# Patient Record
Sex: FEMALE | Race: OTHER
[De-identification: names, ages, dates, MRNs, and addresses within clinical notes are randomized per-mention and may not be internally consistent; named-entity substitution may affect disease eponyms.]

---

## 2017-02-16 ENCOUNTER — RECORD ABSTRACTING (OUTPATIENT)
Age: 62
End: 2017-02-16

## 2017-02-16 DIAGNOSIS — M25.572 PAIN IN LEFT ANKLE AND JOINTS OF LEFT FOOT: ICD-10-CM

## 2017-02-16 DIAGNOSIS — Z01.419 ENCOUNTER FOR GYNECOLOGICAL EXAMINATION (GENERAL) (ROUTINE) W/OUT ABNORMAL FINDINGS: ICD-10-CM

## 2017-02-16 DIAGNOSIS — Z78.9 OTHER SPECIFIED HEALTH STATUS: ICD-10-CM

## 2017-02-16 DIAGNOSIS — Z92.89 PERSONAL HISTORY OF OTHER MEDICAL TREATMENT: ICD-10-CM

## 2017-02-16 DIAGNOSIS — R73.03 PREDIABETES.: ICD-10-CM

## 2017-02-16 DIAGNOSIS — M79.672 PAIN IN LEFT FOOT: ICD-10-CM

## 2017-02-16 DIAGNOSIS — K29.70 GASTRITIS, UNSPECIFIED, W/OUT BLEEDING: ICD-10-CM

## 2017-02-16 DIAGNOSIS — Z87.09 PERSONAL HISTORY OF OTHER DISEASES OF THE RESPIRATORY SYSTEM: ICD-10-CM

## 2017-02-16 PROBLEM — Z00.00 ENCOUNTER FOR PREVENTIVE HEALTH EXAMINATION: Status: ACTIVE | Noted: 2017-02-16

## 2017-02-16 RX ORDER — OMEPRAZOLE 40 MG/1
40 CAPSULE, DELAYED RELEASE ORAL
Refills: 0 | Status: ACTIVE | COMMUNITY

## 2017-02-23 ENCOUNTER — APPOINTMENT (OUTPATIENT)
Dept: INTERNAL MEDICINE | Facility: CLINIC | Age: 62
End: 2017-02-23

## 2018-07-23 PROBLEM — R73.03 PREDIABETES: Status: ACTIVE | Noted: 2017-02-16

## 2022-04-04 ENCOUNTER — EMERGENCY (EMERGENCY)
Facility: HOSPITAL | Age: 67
LOS: 0 days | Discharge: HOME | End: 2022-04-04
Attending: EMERGENCY MEDICINE | Admitting: EMERGENCY MEDICINE
Payer: SUBSIDIZED

## 2022-04-04 VITALS
RESPIRATION RATE: 20 BRPM | HEART RATE: 70 BPM | DIASTOLIC BLOOD PRESSURE: 63 MMHG | SYSTOLIC BLOOD PRESSURE: 101 MMHG | OXYGEN SATURATION: 99 % | TEMPERATURE: 98 F
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
## 2022-04-04 DIAGNOSIS — M54.6 PAIN IN THORACIC SPINE: ICD-10-CM

## 2022-04-04 DIAGNOSIS — M25.511 PAIN IN RIGHT SHOULDER: ICD-10-CM

## 2022-04-04 DIAGNOSIS — R07.81 PLEURODYNIA: ICD-10-CM
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
## 2022-04-04 PROCEDURE — 71046 X-RAY EXAM CHEST 2 VIEWS: CPT | Mod: 26

## 2022-04-04 PROCEDURE — 73080 X-RAY EXAM OF ELBOW: CPT | Mod: 26,RT

## 2022-04-04 PROCEDURE — 99285 EMERGENCY DEPT VISIT HI MDM: CPT

## 2022-04-04 PROCEDURE — 73090 X-RAY EXAM OF FOREARM: CPT | Mod: 26,RT

## 2022-04-04 PROCEDURE — 73060 X-RAY EXAM OF HUMERUS: CPT | Mod: 26,RT

## 2022-04-04 PROCEDURE — G1004: CPT

## 2022-04-04 PROCEDURE — 71250 CT THORAX DX C-: CPT | Mod: 26,ME

## 2022-04-04 RX ORDER — KETOROLAC TROMETHAMINE 30 MG/ML
30 SYRINGE (ML) INJECTION ONCE
Refills: 0 | Status: DISCONTINUED | OUTPATIENT
Start: 2022-04-04 | End: 2022-04-04

## 2022-04-04 RX ORDER — MORPHINE SULFATE 50 MG/1
2 CAPSULE, EXTENDED RELEASE ORAL ONCE
Refills: 0 | Status: DISCONTINUED | OUTPATIENT
Start: 2022-04-04 | End: 2022-04-04

## 2022-04-04 RX ORDER — METHOCARBAMOL 500 MG/1
2 TABLET, FILM COATED ORAL
Qty: 18 | Refills: 0
Start: 2022-04-04 | End: 2022-04-06

## 2022-04-04 RX ORDER — KETOROLAC TROMETHAMINE 30 MG/ML
1 SYRINGE (ML) INJECTION
Qty: 12 | Refills: 0
Start: 2022-04-04 | End: 2022-04-06

## 2022-04-04 RX ADMIN — MORPHINE SULFATE 2 MILLIGRAM(S): 50 CAPSULE, EXTENDED RELEASE ORAL at 17:21

## 2022-04-04 RX ADMIN — Medication 30 MILLIGRAM(S): at 12:38

## 2022-04-04 NOTE — ED PROVIDER NOTE - PATIENT PORTAL LINK FT
You can access the FollowMyHealth Patient Portal offered by [facility] by registering at the following website: [email or web address]. By joining [employer]’s FollowMyHealth portal, you will also be able to view your health information using other applications (apps) compatible with our system.

## 2022-04-04 NOTE — ED PROVIDER NOTE - PHYSICAL EXAMINATION
CONSTITUTIONAL: Well-developed; well-nourished; in no acute distress.   SKIN: warm, dry.  HEAD: Normocephalic; atraumatic.  EYES: PERRL, EOMI, no conjunctival erythema.  ENT: No nasal discharge; airway clear. MMM.  NECK: Supple; non tender. No c-spine ttp.  CARD: S1, S2 normal; no murmurs, gallops, or rubs. Regular rate and rhythm. +ttp to R ribcage.  RESP: No wheezes, rales, or rhonchi. Lungs CTAB.  ABD: soft ntnd; no masses, rebound, or guarding.  EXT: No clubbing, cyanosis, or edema. No spinal ttp. +ttp to R scapula and R shoulder, proximal humerus, and elbow. Pulses 2+ intact.  NEURO: Alert, oriented, grossly unremarkable. No focal neuro. deficits. Normal sensation.  PSYCH: Cooperative, appropriate.

## 2022-04-04 NOTE — ED PROVIDER NOTE - NSFOLLOWUPINSTRUCTIONS_ED_ALL_ED_FT
Acute Back Pain, Adult      Acute back pain is sudden and usually short-lived. It is often caused by an injury to the muscles and tissues in the back. The injury may result from:  •A muscle or ligament getting overstretched or torn (strained). Ligaments are tissues that connect bones to each other. Lifting something improperly can cause a back strain.      •Wear and tear (degeneration) of the spinal disks. Spinal disks are circular tissue that provide cushioning between the bones of the spine (vertebrae).      •Twisting motions, such as while playing sports or doing yard work.      •A hit to the back.      •Arthritis.      You may have a physical exam, lab tests, and imaging tests to find the cause of your pain. Acute back pain usually goes away with rest and home care.      Follow these instructions at home:    Managing pain, stiffness, and swelling     •Treatment may include medicines for pain and inflammation that are taken by mouth or applied to the skin, prescription pain medicine, or muscle relaxants. Take over-the-counter and prescription medicines only as told by your health care provider.    •Your health care provider may recommend applying ice during the first 24–48 hours after your pain starts. To do this:  •Put ice in a plastic bag.      •Place a towel between your skin and the bag.      •Leave the ice on for 20 minutes, 2–3 times a day.      •If directed, apply heat to the affected area as often as told by your health care provider. Use the heat source that your health care provider recommends, such as a moist heat pack or a heating pad.  •Place a towel between your skin and the heat source.      •Leave the heat on for 20–30 minutes.      •Remove the heat if your skin turns bright red. This is especially important if you are unable to feel pain, heat, or cold. You have a greater risk of getting burned.          Activity      • Do not stay in bed. Staying in bed for more than 1–2 days can delay your recovery.    •Sit up and stand up straight. Avoid leaning forward when you sit or hunching over when you stand.  •If you work at a desk, sit close to it so you do not need to lean over. Keep your chin tucked in. Keep your neck drawn back, and keep your elbows bent at a 90-degree angle (right angle).      •Sit high and close to the steering wheel when you drive. Add lower back (lumbar) support to your car seat, if needed.        •Take short walks on even surfaces as soon as you are able. Try to increase the length of time you walk each day.      • Do not sit, drive, or  one place for more than 30 minutes at a time. Sitting or standing for long periods of time can put stress on your back.      • Do not drive or use heavy machinery while taking prescription pain medicine.    •Use proper lifting techniques. When you bend and lift, use positions that put less stress on your back:  •Bend your knees.      •Keep the load close to your body.      •Avoid twisting.        •Exercise regularly as told by your health care provider. Exercising helps your back heal faster and helps prevent back injuries by keeping muscles strong and flexible.      •Work with a physical therapist to make a safe exercise program, as recommended by your health care provider. Do any exercises as told by your physical therapist.      Lifestyle     •Maintain a healthy weight. Extra weight puts stress on your back and makes it difficult to have good posture.      •Avoid activities or situations that make you feel anxious or stressed. Stress and anxiety increase muscle tension and can make back pain worse. Learn ways to manage anxiety and stress, such as through exercise.      General instructions     •Sleep on a firm mattress in a comfortable position. Try lying on your side with your knees slightly bent. If you lie on your back, put a pillow under your knees.    •Follow your treatment plan as told by your health care provider. This may include:  •Cognitive or behavioral therapy.      •Acupuncture or massage therapy.      •Meditation or yoga.          Contact a health care provider if:    •You have pain that is not relieved with rest or medicine.      •You have increasing pain going down into your legs or buttocks.      •Your pain does not improve after 2 weeks.      •You have pain at night.      •You lose weight without trying.      •You have a fever or chills.        Get help right away if:    •You develop new bowel or bladder control problems.      •You have unusual weakness or numbness in your arms or legs.      •You develop nausea or vomiting.      •You develop abdominal pain.      •You feel faint.        Summary    •Acute back pain is sudden and usually short-lived.      •Use proper lifting techniques. When you bend and lift, use positions that put less stress on your back.      •Take over-the-counter and prescription medicines and apply heat or ice as directed by your health care provider.      This information is not intended to replace advice given to you by your health care provider. Make sure you discuss any questions you have with your health care provider.

## 2022-04-04 NOTE — ED PROVIDER NOTE - OBJECTIVE STATEMENT
66 yo f presenting for R shoulder and R upper back pain s/p mechanical trip and fall 4 days ago. No head trauma, no LOC, not on any AC. Denies any headache, numbness/tingling, weakness, sob, cp, or abdominal pain.

## 2022-04-04 NOTE — ED PROVIDER NOTE - NSFOLLOWUPCLINICS_GEN_ALL_ED_FT
[facility]  Medicine  [address], [place]   Phone: [phone]  Fax:   Follow Up Time: 4-6 Days

## 2022-04-04 NOTE — ED PROVIDER NOTE - ATTENDING CONTRIBUTION TO CARE
I personally evaluated patient. I agree with the findings and plan with all documentation on chart except as documented  in my note.    67-year-old female presents to the emergency department with right shoulder and right upper back pain for the past 4 days.  Patient had a mechanical trip and fall on her right side.  She denies any head trauma.  No LOC.  Patient's been taking over-the-counter medicine without relief.  No fever, chills, or recent illness.  Patient denies abdominal pain, numbness, weakness, tingling.  Patient denies chest pain.  No other trauma or injuries.  On exam, vital signs reviewed.  Patient appears in mild pain.  Gross neuro exam is normal.  GCS 15.  Patient is Nexus negative.  Patient has tenderness to right upper scapula and right proximal humerus, right elbow.  Patient has normal pulses and sensation.  Appropriate imaging done and patient not found to have any acute traumatic injuries.  Will discharge with anti-inflammatory course of muscle relaxant.  Patient has proper follow-up.    I discussed with patient need for possible MRI for further work up for their symptoms and how this was not possible in the Emergency Department at this time.  Follow up being provided to have further evaluation for this test given.    Full DC instructions discussed and patient knows when to seek immediate medical attention.  Patient has proper follow up.  All results discussed and patient aware they may require further work up.  Proper follow up ensured. Limitations of ED work up discussed.  Medications administered and prescribed/OTC home meds discussed.  All questions and concerns from patient or family addressed. Understanding of instructions verbalized.

## 2022-04-04 NOTE — ED PROVIDER NOTE - CLINICAL SUMMARY MEDICAL DECISION MAKING FREE TEXT BOX
67-year-old female presents to the emergency department with right shoulder and right upper back pain for the past 4 days.  Patient had a mechanical trip and fall on her right side.  She denies any head trauma.  No LOC.  Patient's been taking over-the-counter medicine without relief.  No fever, chills, or recent illness.  Patient denies abdominal pain, numbness, weakness, tingling.  Patient denies chest pain.  No other trauma or injuries.  On exam, vital signs reviewed.  Patient appears in mild pain.  Gross neuro exam is normal.  GCS 15.  Patient is Nexus negative.  Patient has tenderness to right upper scapula and right proximal humerus, right elbow.  Patient has normal pulses and sensation.  Appropriate imaging done and patient not found to have any acute traumatic injuries.  Will discharge with anti-inflammatory course of muscle relaxant.  Patient has proper follow-up.    I discussed with patient need for possible MRI for further work up for their symptoms and how this was not possible in the Emergency Department at this time.  Follow up being provided to have further evaluation for this test given.    Full DC instructions discussed and patient knows when to seek immediate medical attention.  Patient has proper follow up.  All results discussed and patient aware they may require further work up.  Proper follow up ensured. Limitations of ED work up discussed.  Medications administered and prescribed/OTC home meds discussed.  All questions and concerns from patient or family addressed. Understanding of instructions verbalized.

## 2022-04-13 ENCOUNTER — APPOINTMENT (OUTPATIENT)
Dept: ORTHOPEDIC SURGERY | Facility: CLINIC | Age: 67
End: 2022-04-13

## 2022-04-26 ENCOUNTER — OUTPATIENT (OUTPATIENT)
Dept: OUTPATIENT SERVICES | Facility: HOSPITAL | Age: 67
LOS: 1 days | Discharge: HOME | End: 2022-04-26
Payer: SUBSIDIZED

## 2022-04-26 ENCOUNTER — APPOINTMENT (OUTPATIENT)
Dept: OPHTHALMOLOGY | Facility: CLINIC | Age: 67
End: 2022-04-26

## 2022-04-26 PROCEDURE — 92004 COMPRE OPH EXAM NEW PT 1/>: CPT

## 2022-05-05 DIAGNOSIS — H25.13 AGE-RELATED NUCLEAR CATARACT, BILATERAL: ICD-10-CM

## 2022-05-05 DIAGNOSIS — H04.123 DRY EYE SYNDROME OF BILATERAL LACRIMAL GLANDS: ICD-10-CM

## 2022-05-05 DIAGNOSIS — H35.039 HYPERTENSIVE RETINOPATHY, UNSPECIFIED EYE: ICD-10-CM

## 2022-08-23 ENCOUNTER — OUTPATIENT (OUTPATIENT)
Dept: OUTPATIENT SERVICES | Facility: HOSPITAL | Age: 67
LOS: 1 days | Discharge: HOME | End: 2022-08-23

## 2022-08-23 ENCOUNTER — APPOINTMENT (OUTPATIENT)
Dept: OPHTHALMOLOGY | Facility: CLINIC | Age: 67
End: 2022-08-23

## 2022-08-23 PROCEDURE — 92014 COMPRE OPH EXAM EST PT 1/>: CPT

## 2022-09-06 ENCOUNTER — OUTPATIENT (OUTPATIENT)
Dept: OUTPATIENT SERVICES | Facility: HOSPITAL | Age: 67
LOS: 1 days | Discharge: HOME | End: 2022-09-06

## 2022-09-06 ENCOUNTER — APPOINTMENT (OUTPATIENT)
Dept: NEUROLOGY | Facility: CLINIC | Age: 67
End: 2022-09-06

## 2022-09-06 VITALS
BODY MASS INDEX: 23.95 KG/M2 | DIASTOLIC BLOOD PRESSURE: 67 MMHG | WEIGHT: 149 LBS | HEIGHT: 66 IN | TEMPERATURE: 97 F | SYSTOLIC BLOOD PRESSURE: 101 MMHG | HEART RATE: 67 BPM | OXYGEN SATURATION: 98 %

## 2022-09-06 DIAGNOSIS — F03.90 UNSPECIFIED DEMENTIA WITHOUT BEHAVIORAL DISTURBANCE: ICD-10-CM

## 2022-09-06 PROCEDURE — 99204 OFFICE O/P NEW MOD 45 MIN: CPT | Mod: GC

## 2022-09-06 NOTE — ASSESSMENT
[FreeTextEntry1] : Assessment and plan:  \par \par 66 yo F w PMH of asthma is here with memory issues, and today’s exam revealed more short-term memory problems with relatively benign neurologic examination. Possible causes include but not limited to Alsheimer type Dementia vs Fronto-temporal dementia. Needs to rule out reversible types of dementia/cognitive disorder.  \par \par  \par \par Plan:  \par \par 1.  CBC w diff , CMP, Ca, Mg, Phos, TSH, Vit B12, folate \par 2.  CT head \par 3.  Donepezil 5 mg po qday with food.\par 4.  Return in 3-4 months.\par 5.  Discussed home safety.   \par \par

## 2022-09-06 NOTE — PHYSICAL EXAM
[FreeTextEntry1] : General:  Appearance is consistent with chronologic age.  \par \par Cognitive/Language:  Awake, alert, and oriented to person, place, time and date.  Recent memory: 2 words recall from 3, some signs of ideomotor apraxia, seems like remote memory intact.  Naming, repetition and comprehension intact. Nondysarthric.   \par \par Cranial Nerves \par \par - Eyes: Visual acuity intact, Visual fields full.  EOMI w/o nystagmus, skew or reported double vision.  PERRL.  No ptosis/weakness of eyelid closure.   \par \par - Face:  Facial sensation normal V1 - 3, no facial asymmetry.   \par \par - Ears/Nose/Throat:  Hearing grossly intact b/l to finger rub.  Palate elevates midline.  Tongue and uvula midline.  \par \par Motor examination:  (MRC grade R/L) 5/5 UE; 5/5 LE.  No observable drift. Normal tone and bulk. No tenderness, twitching, tremors or involuntary movements. \par \par Sensory examination:  Intact to light touch and pinprick, pain, temperature and proprioception and vibration in all extremities. \par \par Reflexes:   2+ b/l biceps, triceps, patella and achilles.  Plantar response downgoing b/l.  Jaw jerk, Abel, clonus absent. \par \par Cerebellum:   FTN/HKS intact.  No dysmetria.   \par \par Gait narrow based and normal.

## 2022-09-06 NOTE — HISTORY OF PRESENT ILLNESS
[FreeTextEntry1] : 66 yo F w PMH of Asthma, back pain p/w memory issues and per her daughter stating starting about 7  years ago. She started having short-memory problems first, forgetting the keys, money displaced. About 3-4 years ago her daughter noticed her having some personality changes being more joyful, “hyped” and careless. She loves going outside and spending time with her neighbors, friends and always can find her way back home. He daughter worried that she may by lost some day since she lives home alone.  \par \par PMH: Asthma, with remote attacks, panic attacks, back pain \par \par PSxH: Uterine surgery (unspecified) 3-4 y ago.  \par \par FHx: From 12 siblings alive only 4 (many of them  in young age with unknown reason): 3 sisters and 1 brother. The older sister has some behavioral issues and the younger sister who is in her 50s has progressive muscular dystrophy. She may have thalassemia trait.  \par \par SHx: Lives in [place], with his son, she is here visiting her youngest daughter.  \par \par ROS: No vision problems, HA, Seizures, diplopia, hallucinations, dizziness, balance problems, weakness, appetite or weight loss.

## 2022-09-06 NOTE — END OF VISIT
[] : Resident [FreeTextEntry3] : Pt seen and I think AD is most likely.  Will check labs and imaging and start on donepeizl 5 mg/day.  Return in 3-4 months.

## 2022-09-28 ENCOUNTER — APPOINTMENT (OUTPATIENT)
Dept: INTERNAL MEDICINE | Facility: CLINIC | Age: 67
End: 2022-09-28

## 2022-09-29 ENCOUNTER — OUTPATIENT (OUTPATIENT)
Dept: OUTPATIENT SERVICES | Facility: HOSPITAL | Age: 67
LOS: 1 days | Discharge: HOME | End: 2022-09-29

## 2022-09-29 DIAGNOSIS — F03.90 UNSPECIFIED DEMENTIA WITHOUT BEHAVIORAL DISTURBANCE: ICD-10-CM

## 2022-09-29 PROCEDURE — 70450 CT HEAD/BRAIN W/O DYE: CPT | Mod: 26

## 2022-10-08 LAB
FOLATE SERPL-MCNC: 17 NG/ML
TSH SERPL-ACNC: 1.09 UIU/ML
VIT B12 SERPL-MCNC: 502 PG/ML

## 2022-10-12 ENCOUNTER — NON-APPOINTMENT (OUTPATIENT)
Age: 67
End: 2022-10-12

## 2023-02-28 ENCOUNTER — OUTPATIENT (OUTPATIENT)
Dept: OUTPATIENT SERVICES | Facility: HOSPITAL | Age: 68
LOS: 1 days | End: 2023-02-28
Payer: COMMERCIAL

## 2023-02-28 ENCOUNTER — APPOINTMENT (OUTPATIENT)
Dept: OPHTHALMOLOGY | Facility: CLINIC | Age: 68
End: 2023-02-28

## 2023-02-28 DIAGNOSIS — K02.9 DENTAL CARIES, UNSPECIFIED: ICD-10-CM

## 2023-02-28 PROCEDURE — D0140: CPT

## 2023-02-28 PROCEDURE — D0220: CPT

## 2023-03-01 DIAGNOSIS — K03.81 CRACKED TOOTH: ICD-10-CM

## 2023-03-07 ENCOUNTER — APPOINTMENT (OUTPATIENT)
Dept: NEUROLOGY | Facility: CLINIC | Age: 68
End: 2023-03-07

## 2023-03-07 ENCOUNTER — APPOINTMENT (OUTPATIENT)
Dept: NEUROLOGY | Facility: CLINIC | Age: 68
End: 2023-03-07
Payer: COMMERCIAL

## 2023-03-07 ENCOUNTER — OUTPATIENT (OUTPATIENT)
Dept: OUTPATIENT SERVICES | Facility: HOSPITAL | Age: 68
LOS: 1 days | End: 2023-03-07
Payer: COMMERCIAL

## 2023-03-07 VITALS
SYSTOLIC BLOOD PRESSURE: 129 MMHG | HEART RATE: 64 BPM | DIASTOLIC BLOOD PRESSURE: 74 MMHG | BODY MASS INDEX: 25.52 KG/M2 | TEMPERATURE: 97 F | WEIGHT: 158.8 LBS | OXYGEN SATURATION: 100 % | HEIGHT: 66 IN

## 2023-03-07 DIAGNOSIS — Z00.00 ENCOUNTER FOR GENERAL ADULT MEDICAL EXAMINATION WITHOUT ABNORMAL FINDINGS: ICD-10-CM

## 2023-03-07 PROCEDURE — 99213 OFFICE O/P EST LOW 20 MIN: CPT

## 2023-03-08 NOTE — HISTORY OF PRESENT ILLNESS
[FreeTextEntry1] : 69 yo F w PMH of Asthma, back pain is here for f/u for memory issues that started about 7  years ago. Lab works were done and was unremarkable. She started her medications: Aricept 5 mg. and per son she is much better, but when she forgets to take medications her memory is poor. \par \par \par PMH: Asthma, with remote attacks, panic attacks, back pain \par \par PSxH: Uterine surgery (unspecified) 3-4 y ago.  \par \par FHx: From 12 siblings alive only 4 (many of them  in young age with unknown reason): 3 sisters and 1 brother. The older sister has some behavioral issues and the younger sister who is in her 50s has progressive muscular dystrophy. She may have thalassemia trait.  \par \par SHx: Lives in [place], with his son, she is here visiting her youngest daughter.  \par \par ROS: No vision problems, HA, Seizures, diplopia, hallucinations, dizziness, balance problems, weakness, appetite or weight loss.

## 2023-03-08 NOTE — ASSESSMENT
[FreeTextEntry1] : Assessment and plan:  \par \par 67 yo F w PMH of asthma is here with memory issues, and today’s exam revealed more short-term memory problems with MMSE of 21 with relatively benign neurologic examination. Comparing with 6 month ago, she is better in cognition with less confusion on exam. Possible causes include but not limited to Alzheimer type Dementia vs Fronto-temporal dementia.  CT scan was done, unremarkable for acute changes or masses, only mild chronic microvascular ds.  \par \par  \par \par Plan:  \par \par 1.  Increase Donepezil  to 10 mg po qday with food.\par 2.  Return in 6 months.\par 3.  Discussed home safety.   \par \par

## 2023-03-08 NOTE — END OF VISIT
[] : Resident [FreeTextEntry3] : I visited the patient with resident. Agree with history, physical exam and plan as above.\par Son reported overall stable cognition and memory, somehow some improvement. Recommended to increase Aricept to 10 mg daily. RTC in 6 months

## 2023-03-13 DIAGNOSIS — F03.90 UNSPECIFIED DEMENTIA, UNSPECIFIED SEVERITY, WITHOUT BEHAVIORAL DISTURBANCE, PSYCHOTIC DISTURBANCE, MOOD DISTURBANCE, AND ANXIETY: ICD-10-CM

## 2023-03-28 ENCOUNTER — OUTPATIENT (OUTPATIENT)
Dept: OUTPATIENT SERVICES | Facility: HOSPITAL | Age: 68
LOS: 1 days | End: 2023-03-28
Payer: COMMERCIAL

## 2023-03-28 ENCOUNTER — APPOINTMENT (OUTPATIENT)
Dept: OPHTHALMOLOGY | Facility: CLINIC | Age: 68
End: 2023-03-28
Payer: COMMERCIAL

## 2023-03-28 DIAGNOSIS — H53.8 OTHER VISUAL DISTURBANCES: ICD-10-CM

## 2023-03-28 PROCEDURE — 92134 CPTRZ OPH DX IMG PST SGM RTA: CPT | Mod: 26

## 2023-03-28 PROCEDURE — 92014 COMPRE OPH EXAM EST PT 1/>: CPT

## 2023-03-28 PROCEDURE — 92134 CPTRZ OPH DX IMG PST SGM RTA: CPT

## 2023-03-31 DIAGNOSIS — H35.039 HYPERTENSIVE RETINOPATHY, UNSPECIFIED EYE: ICD-10-CM

## 2023-03-31 DIAGNOSIS — H25.13 AGE-RELATED NUCLEAR CATARACT, BILATERAL: ICD-10-CM

## 2023-03-31 DIAGNOSIS — H04.123 DRY EYE SYNDROME OF BILATERAL LACRIMAL GLANDS: ICD-10-CM

## 2023-04-26 ENCOUNTER — APPOINTMENT (OUTPATIENT)
Dept: OPHTHALMOLOGY | Facility: CLINIC | Age: 68
End: 2023-04-26

## 2023-05-17 ENCOUNTER — OUTPATIENT (OUTPATIENT)
Dept: OUTPATIENT SERVICES | Facility: HOSPITAL | Age: 68
LOS: 1 days | End: 2023-05-17
Payer: COMMERCIAL

## 2023-05-17 DIAGNOSIS — K02.9 DENTAL CARIES, UNSPECIFIED: ICD-10-CM

## 2023-05-17 DIAGNOSIS — K02.63 DENTAL CARIES ON SMOOTH SURFACE PENETRATING INTO PULP: ICD-10-CM

## 2023-05-17 PROCEDURE — D0140: CPT

## 2023-05-17 PROCEDURE — D0330: CPT

## 2023-06-15 ENCOUNTER — OUTPATIENT (OUTPATIENT)
Dept: OUTPATIENT SERVICES | Facility: HOSPITAL | Age: 68
LOS: 1 days | End: 2023-06-15
Payer: COMMERCIAL

## 2023-06-15 ENCOUNTER — APPOINTMENT (OUTPATIENT)
Dept: OPHTHALMOLOGY | Facility: CLINIC | Age: 68
End: 2023-06-15
Payer: COMMERCIAL

## 2023-06-15 DIAGNOSIS — H53.8 OTHER VISUAL DISTURBANCES: ICD-10-CM

## 2023-06-15 DIAGNOSIS — Z01.20 ENCOUNTER FOR DENTAL EXAMINATION AND CLEANING WITHOUT ABNORMAL FINDINGS: ICD-10-CM

## 2023-06-15 PROCEDURE — D0230: CPT

## 2023-06-15 PROCEDURE — 99203 OFFICE O/P NEW LOW 30 MIN: CPT

## 2023-06-15 PROCEDURE — D0120: CPT

## 2023-06-15 PROCEDURE — D0220: CPT

## 2023-06-15 PROCEDURE — 99213 OFFICE O/P EST LOW 20 MIN: CPT

## 2023-06-15 PROCEDURE — D1110: CPT

## 2023-06-16 DIAGNOSIS — H25.013 CORTICAL AGE-RELATED CATARACT, BILATERAL: ICD-10-CM

## 2023-06-16 DIAGNOSIS — H35.369 DRUSEN (DEGENERATIVE) OF MACULA, UNSPECIFIED EYE: ICD-10-CM

## 2023-06-16 DIAGNOSIS — H25.10 AGE-RELATED NUCLEAR CATARACT, UNSPECIFIED EYE: ICD-10-CM

## 2023-06-26 DIAGNOSIS — Z01.21 ENCOUNTER FOR DENTAL EXAMINATION AND CLEANING WITH ABNORMAL FINDINGS: ICD-10-CM

## 2023-08-04 ENCOUNTER — APPOINTMENT (OUTPATIENT)
Dept: OPHTHALMOLOGY | Facility: CLINIC | Age: 68
End: 2023-08-04

## 2023-08-19 ENCOUNTER — APPOINTMENT (OUTPATIENT)
Dept: INTERNAL MEDICINE | Facility: CLINIC | Age: 68
End: 2023-08-19

## 2023-10-24 ENCOUNTER — OUTPATIENT (OUTPATIENT)
Dept: OUTPATIENT SERVICES | Facility: HOSPITAL | Age: 68
LOS: 1 days | End: 2023-10-24
Payer: COMMERCIAL

## 2023-10-24 ENCOUNTER — APPOINTMENT (OUTPATIENT)
Dept: NEUROLOGY | Facility: CLINIC | Age: 68
End: 2023-10-24
Payer: COMMERCIAL

## 2023-10-24 VITALS — DIASTOLIC BLOOD PRESSURE: 87 MMHG | SYSTOLIC BLOOD PRESSURE: 127 MMHG | OXYGEN SATURATION: 99 % | HEART RATE: 65 BPM

## 2023-10-24 DIAGNOSIS — Z00.00 ENCOUNTER FOR GENERAL ADULT MEDICAL EXAMINATION WITHOUT ABNORMAL FINDINGS: ICD-10-CM

## 2023-10-24 PROCEDURE — 99212 OFFICE O/P EST SF 10 MIN: CPT

## 2023-10-25 DIAGNOSIS — F41.0 PANIC DISORDER [EPISODIC PAROXYSMAL ANXIETY]: ICD-10-CM

## 2023-10-25 DIAGNOSIS — F03.90 UNSPECIFIED DEMENTIA, UNSPECIFIED SEVERITY, WITHOUT BEHAVIORAL DISTURBANCE, PSYCHOTIC DISTURBANCE, MOOD DISTURBANCE, AND ANXIETY: ICD-10-CM

## 2023-10-27 ENCOUNTER — APPOINTMENT (OUTPATIENT)
Dept: INTERNAL MEDICINE | Facility: CLINIC | Age: 68
End: 2023-10-27

## 2024-04-30 ENCOUNTER — APPOINTMENT (OUTPATIENT)
Dept: NEUROLOGY | Facility: CLINIC | Age: 69
End: 2024-04-30
Payer: COMMERCIAL

## 2024-04-30 ENCOUNTER — OUTPATIENT (OUTPATIENT)
Dept: OUTPATIENT SERVICES | Facility: HOSPITAL | Age: 69
LOS: 1 days | End: 2024-04-30
Payer: COMMERCIAL

## 2024-04-30 VITALS — HEART RATE: 66 BPM | DIASTOLIC BLOOD PRESSURE: 59 MMHG | SYSTOLIC BLOOD PRESSURE: 99 MMHG | OXYGEN SATURATION: 98 %

## 2024-04-30 DIAGNOSIS — F03.90 UNSPECIFIED DEMENTIA W/OUT BEHAVIORAL DISTURBANCE: ICD-10-CM

## 2024-04-30 DIAGNOSIS — F41.0 PANIC DISORDER [EPISODIC PAROXYSMAL ANXIETY]: ICD-10-CM

## 2024-04-30 DIAGNOSIS — G47.00 INSOMNIA, UNSPECIFIED: ICD-10-CM

## 2024-04-30 DIAGNOSIS — Z00.00 ENCOUNTER FOR GENERAL ADULT MEDICAL EXAMINATION WITHOUT ABNORMAL FINDINGS: ICD-10-CM

## 2024-04-30 PROCEDURE — 99214 OFFICE O/P EST MOD 30 MIN: CPT

## 2024-04-30 RX ORDER — DONEPEZIL HYDROCHLORIDE 10 MG/1
10 TABLET ORAL DAILY
Qty: 60 | Refills: 3 | Status: ACTIVE | COMMUNITY
Start: 2022-09-06 | End: 1900-01-01

## 2024-04-30 RX ORDER — ESCITALOPRAM OXALATE 5 MG/1
5 TABLET ORAL DAILY
Qty: 30 | Refills: 3 | Status: DISCONTINUED | COMMUNITY
Start: 2023-10-24 | End: 2024-04-30

## 2024-04-30 RX ORDER — CHLORHEXIDINE GLUCONATE 4 %
5 LIQUID (ML) TOPICAL
Qty: 60 | Refills: 2 | Status: ACTIVE | COMMUNITY
Start: 2024-04-30 | End: 1900-01-01

## 2024-04-30 RX ORDER — ESCITALOPRAM OXALATE 10 MG/1
10 TABLET ORAL DAILY
Qty: 60 | Refills: 2 | Status: ACTIVE | COMMUNITY
Start: 2024-04-30 | End: 1900-01-01

## 2024-04-30 NOTE — PHYSICAL EXAM
[FreeTextEntry1] : General: Appearance is consistent with chronologic age. Cognitive/Language: Awake, alert, and oriented to person, age, place, time and date (but not year).  Nondysarthric. Cranial Nerves - Eyes: Visual acuity intact, Visual fields full. EOMI w/o nystagmus, skew or reported double vision. PERRL. No ptosis/weakness of eyelid closure - Face: Facial sensation normal V1 - 3, no facial asymmetry. - Ears/Nose/Throat: Hearing grossly intact b/l to finger rub. Palate elevates midline. Tongue and uvula midline. Motor examination: (MRC grade R/L) 5/5 UE; 5/5 LE. No observable drift. Normal tone and bulk. No tremors or involuntary movements.  Tenderness over wrist joints on palpation. Sensory examination: Intact to light touch and pinprick, pain, temperature and proprioception and vibration in all extremities. Reflexes: 2+ b/l biceps, triceps, patella and achilles. Plantar response downgoing b/l. Jaw jerk, Abel, clonus absent. Cerebellum: FTN/HKS intact. No dysmetria. Gait narrow based and normal.

## 2024-04-30 NOTE — HISTORY OF PRESENT ILLNESS
[FreeTextEntry1] : 69 yo F w PMH of asthma, back pain is here for f/u for memory issues that started about 7 years ago. Lab work was done and was unremarkable. CTH in September 2022 unremarkable, showing mild chronic microvascular disease and mild diffuse volume loss.  She takes Aricept (started at 5mg) which was increased to 10mg qD on previous visit in March of this year.  Per daughter, the medication has helped and she feels her mother's memory is stable/improved.  If she forgets to take it, she gets a headache but otherwise does not get headaches.  Per daughter, her memory is not worsened but she sometimes asks to have questions repeated.  She is physically very active (runs), cooks, cleans, with supervision from family.  She lives with daughter and other elderly relatives with whom she bickers.  She has panic attacks and anxiety which comes and goes and also involves chest pain and a sensation of difficulty breathing which resolves after the panic attack passes.  She is friendly and cooperative on exam.   Has occasional pain in joints for which she is seeing her PCP this Friday.  Current visit: Since started on Lexapro, better mood. Doing better. Tolerating well medications she is on. She has headaches but their intensity has improved, only every 10 days. Patient forgets things often. She feels anxiety every 2 days manifested as hand shaking and SOB. She is not visiting a PCP, she would like a referral. No mood problems.

## 2024-04-30 NOTE — ASSESSMENT
[FreeTextEntry1] : 67 yo F w PMH of asthma is here with memory issues. Compared with 6 months ago, her memory issues are stable and she is keeping active (physical activity, cooking, cleaning).  Tolerating donepezil well.  She does experience anxiety and panic attacks with subjective sensation of shortness of breath and chest pain which resolve after panic attack passes every 2 days. On lexapro for that. Patient has insomnia, we started on melatonin.   Plan: - referral for PCP - if insomnia, melatonin 5 mg at bedtime  - continue donepezil 10 mg daily - increase Lexapro to 10 mg daily for anxiety attacks - encouraged continued physical activity and engagement - follow-up in 6 months or sooner as needed

## 2024-05-01 DIAGNOSIS — F41.0 PANIC DISORDER [EPISODIC PAROXYSMAL ANXIETY]: ICD-10-CM

## 2024-05-01 DIAGNOSIS — F03.90 UNSPECIFIED DEMENTIA WITHOUT BEHAVIORAL DISTURBANCE: ICD-10-CM

## 2024-05-01 DIAGNOSIS — G47.00 INSOMNIA, UNSPECIFIED: ICD-10-CM

## 2024-05-06 ENCOUNTER — APPOINTMENT (OUTPATIENT)
Dept: INTERNAL MEDICINE | Facility: CLINIC | Age: 69
End: 2024-05-06

## 2024-06-02 ENCOUNTER — EMERGENCY (EMERGENCY)
Facility: HOSPITAL | Age: 69
LOS: 0 days | Discharge: HOME | End: 2024-06-02
Attending: STUDENT IN AN ORGANIZED HEALTH CARE EDUCATION/TRAINING PROGRAM
Payer: SELF-PAY

## 2024-06-02 VITALS
SYSTOLIC BLOOD PRESSURE: 120 MMHG | WEIGHT: 149.91 LBS | HEART RATE: 62 BPM | OXYGEN SATURATION: 98 % | HEIGHT: 67 IN | TEMPERATURE: 98 F | RESPIRATION RATE: 17 BRPM | DIASTOLIC BLOOD PRESSURE: 67 MMHG

## 2024-06-02 DIAGNOSIS — S61.012A LACERATION WITHOUT FOREIGN BODY OF LEFT THUMB WITHOUT DAMAGE TO NAIL, INITIAL ENCOUNTER: ICD-10-CM

## 2024-06-02 DIAGNOSIS — G30.9 ALZHEIMER'S DISEASE, UNSPECIFIED: ICD-10-CM
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
## 2024-06-02 DIAGNOSIS — F02.80 DEMENTIA IN OTHER DISEASES CLASSIFIED ELSEWHERE, UNSPECIFIED SEVERITY, WITHOUT BEHAVIORAL DISTURBANCE, PSYCHOTIC DISTURBANCE, MOOD DISTURBANCE, AND ANXIETY: ICD-10-CM

## 2024-06-02 DIAGNOSIS — S61.211A LACERATION WITHOUT FOREIGN BODY OF LEFT INDEX FINGER WITHOUT DAMAGE TO NAIL, INITIAL ENCOUNTER: ICD-10-CM

## 2024-06-02 DIAGNOSIS — Z23 ENCOUNTER FOR IMMUNIZATION: ICD-10-CM
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
## 2024-06-02 PROCEDURE — 12001 RPR S/N/AX/GEN/TRNK 2.5CM/<: CPT

## 2024-06-02 PROCEDURE — 99284 EMERGENCY DEPT VISIT MOD MDM: CPT | Mod: 25

## 2024-06-02 PROCEDURE — 90715 TDAP VACCINE 7 YRS/> IM: CPT

## 2024-06-02 PROCEDURE — 12002 RPR S/N/AX/GEN/TRNK2.6-7.5CM: CPT

## 2024-06-02 PROCEDURE — 99283 EMERGENCY DEPT VISIT LOW MDM: CPT | Mod: 25

## 2024-06-02 PROCEDURE — 90471 IMMUNIZATION ADMIN: CPT

## 2024-06-02 RX ORDER — TETANUS TOXOID, REDUCED DIPHTHERIA TOXOID AND ACELLULAR PERTUSSIS VACCINE, ADSORBED 5; 2.5; 8; 8; 2.5 [IU]/.5ML; [IU]/.5ML; UG/.5ML; UG/.5ML; UG/.5ML
0.5 SUSPENSION INTRAMUSCULAR ONCE
Refills: 0 | Status: COMPLETED | OUTPATIENT
Start: 2024-06-02 | End: 2024-06-02

## 2024-06-02 RX ADMIN — TETANUS TOXOID, REDUCED DIPHTHERIA TOXOID AND ACELLULAR PERTUSSIS VACCINE, ADSORBED 0.5 MILLILITER(S): 5; 2.5; 8; 8; 2.5 SUSPENSION INTRAMUSCULAR at 22:42

## 2024-06-02 NOTE — ED PROVIDER NOTE - ATTENDING APP SHARED VISIT CONTRIBUTION OF CARE
69-year-old female past medical history significant for Alzheimer's who presents for a laceration.  Patient was cutting food in her kitchen when she sustained a laceration to the base of the left thumb.  Patient is right-hand dominant.  Patient denies any numbness tingling or any other medical complaints.    Vital Signs: I have reviewed the initial vital signs.  Constitutional: well-nourished, appears stated age, no acute distress  Eyes: Conjunctiva pink, Sclera clear  Cardiovascular: well-perfused extremities, radial pulses equal and 2+ b/l.   Respiratory: unlabored respiratory effort, pt is speaking full sentences. no accessory muscle use.   Musculoskeletal: FROM of digits b/l.   Integumentary: warm, dry, no rash. ~1.5 laceration to the webbed space between the left thumb and index finger  Neurologic: awake, alert, median, radial, and ulnar nerve motor and sensory functions grossly intact b/l.   Psychiatric: appropriate mood, appropriate affect

## 2024-06-02 NOTE — ED PROVIDER NOTE - PATIENT PORTAL LINK FT
You can access the FollowMyHealth Patient Portal offered by [facility] by registering at the following website: [email or web address]. By joining [employer]’s FollowMyHealth portal, you will also be able to view your health information using other applications (apps) compatible with our system.

## 2024-06-02 NOTE — ED PROVIDER NOTE - PHYSICAL EXAMINATION
Physical Exam    Vital Signs: I have reviewed the initial vital signs.  Constitutional: well-nourished, appears stated age, no acute distress  Eyes: Conjunctiva pink, Sclera clear  Cardiovascular: well-perfused extremities, radial pulses equal and 2+ b/l.   Respiratory: unlabored respiratory effort, pt is speaking full sentences. no accessory muscle use.   Musculoskeletal: FROM of digits b/l.   Integumentary: warm, dry, no rash. ~1.5 laceration to the webbed space between the left thumb and index finger  Neurologic: awake, alert, median, radial, and ulnar nerve motor and sensory functions grossly intact b/l.   Psychiatric: appropriate mood, appropriate affect

## 2024-06-02 NOTE — ED PROVIDER NOTE - NSFOLLOWUPINSTRUCTIONS_ED_ALL_ED_FT
PLEASE RETURN IN ABOUT 10 DAYS FOR SUTURE REMOVAL.     Laceration    A laceration is a cut that goes through all of the layers of the skin and into the tissue that is right under the skin. Some lacerations heal on their own. Others need to be closed with stitches (sutures), staples, skin adhesive strips, or skin glue. Proper laceration care minimizes the risk of infection and helps the laceration to heal better.     SEEK IMMEDIATE MEDICAL CARE IF YOU HAVE THE FOLLOWING SYMPTOMS: swelling around the wound, worsening pain, drainage from the wound, red streaking going away from your wound, inability to move finger or toe near the laceration, or discoloration of skin near the laceration.

## 2024-06-02 NOTE — ED PROCEDURE NOTE - NS ED ATTENDING STATEMENT MOD
This was a shared visit with the DAVID. I reviewed and verified the documentation.
This was a shared visit with the DAVID. I reviewed and verified the documentation.

## 2024-06-02 NOTE — ED PROVIDER NOTE - OBJECTIVE STATEMENT
70 y/o Hebrew speaking female with a PMH of alzheimers presents to the ED for evaluation of laceration to the webbed space between the left thumb and index finger s/p cutting herself accidentally with a kitchen knife this evening. pt is right hand dominant. pt denies weakness, numbness, or tingling. pt and pt son cannot recall pt last tetanus vaccine.

## 2024-06-02 NOTE — ED PROVIDER NOTE - CLINICAL SUMMARY MEDICAL DECISION MAKING FREE TEXT BOX
69 yr old f that presents s/p laceration to the base of the L thumb, sensation intact, full rom, no tendon exposure. no active bleeding or foreign body visualized on exam. tdap given. will repair lac. will dc pt with strict wound precautions (son at bedside).   Appropriate medications for patient's presenting complaints were ordered and effects were reassessed.  Patient's records (prior hospital, ED visit, and/or nursing home notes if available) were reviewed.  Additional history was obtained from EMS, family, and/or PCP (where available).  Escalation to admission/observation was considered.   However patient feels much better and is comfortable with discharge.  Appropriate follow-up was arranged.    I have discussed the discharge plan with the patient. The patient agrees with the plan, as discussed.  The patient understands Emergency Department diagnosis is a preliminary diagnosis often based on limited information and that the patient must adhere to the follow-up plan as discussed.  The patient understands that if the symptoms worsen or if prescribed medications do not have the desired/planned effect that the patient may return to the Emergency Department at any time for further evaluation and treatment.

## 2024-06-02 NOTE — ED PROVIDER NOTE - PROGRESS NOTE DETAILS
FF: pt laceration was cleansed, repaired and dressed. pt advised of strict return precautions discussed at bedside. agreeable to dc. advised to return in 10 days for suture removal.

## 2024-06-02 NOTE — ED PROCEDURE NOTE - ATTENDING APP SHARED VISIT CONTRIBUTION OF CARE
I was present for and supervised the key and critical aspects of the procedures performed during the care of the patient.
I was present for and supervised the key and critical aspects of the procedures performed during the care of the patient..

## 2024-09-20 NOTE — ED ADULT TRIAGE NOTE - WEIGHT IN KG
I called Dr. Martin's office.....    You have an appointment on September 26 at 10:30 in the morning.    The address is on the paperwork.  It is [address]    I have also put in follow-up with your primary care, GI and the heart and valve clinic.  
68

## 2024-11-05 ENCOUNTER — APPOINTMENT (OUTPATIENT)
Dept: NEUROLOGY | Facility: CLINIC | Age: 69
End: 2024-11-05

## 2025-01-16 NOTE — ED PROVIDER NOTE - SECONDARY DIAGNOSIS.
[facility]                                                                                   OUTPATIENT OCCUPATIONAL THERAPY    PLAN OF TREATMENT FOR OUTPATIENT REHABILITATION   Patient's Last Name, First Name, Laxmi Coleman YOB: 1977   Provider's Name   [facility]   Medical Record No.  [id number]     Onset Date: 03/14/24 Start of Care Date: 03/21/24     Medical Diagnosis:  CMC OA, B CTS      OT Treatment Diagnosis:  CMC OA, B CTS Plan of Treatment  Frequency/Duration:1x per month/(P) 1 month    Certification date from 10/29/24   To 01/26/25        See note for plan of treatment details and functional goals     Bettye Dalton OT                         I CERTIFY THE NEED FOR THESE SERVICES FURNISHED UNDER        THIS PLAN OF TREATMENT AND WHILE UNDER MY CARE     (Physician attestation of this document indicates review and certification of the therapy plan).              Referring Provider:  Ingrid Leach    Sign: _______________________________________________________________    Initial Assessment  See Epic Evaluation- 03/21/24          PLAN  Continue therapy per current plan of care.    Beginning/End Dates of Progress Note Reporting Period:  9/24/24 to 01/16/2025    Referring Provider:  Ingrid Leach     Rib pain

## 2025-03-19 ENCOUNTER — APPOINTMENT (OUTPATIENT)
Dept: NEUROLOGY | Facility: CLINIC | Age: 70
End: 2025-03-19
Payer: MEDICAID

## 2025-03-19 VITALS
WEIGHT: 180 LBS | DIASTOLIC BLOOD PRESSURE: 66 MMHG | SYSTOLIC BLOOD PRESSURE: 98 MMHG | HEART RATE: 64 BPM | BODY MASS INDEX: 28.93 KG/M2 | OXYGEN SATURATION: 97 % | HEIGHT: 66 IN

## 2025-03-19 DIAGNOSIS — F03.90 UNSPECIFIED DEMENTIA W/OUT BEHAVIORAL DISTURBANCE: ICD-10-CM

## 2025-03-19 PROCEDURE — 99215 OFFICE O/P EST HI 40 MIN: CPT

## 2025-03-19 RX ORDER — MULTIVIT,IRON,MINERALS/LUTEIN
TABLET ORAL
Refills: 0 | Status: ACTIVE | COMMUNITY

## 2025-03-19 RX ORDER — MONTELUKAST SODIUM 10 MG/1
TABLET, FILM COATED ORAL DAILY
Refills: 0 | Status: ACTIVE | COMMUNITY

## 2025-03-19 RX ORDER — ERGOCALCIFEROL (VITAMIN D2) 50 MCG
50 MCG CAPSULE ORAL
Refills: 0 | Status: ACTIVE | COMMUNITY

## 2025-03-19 RX ORDER — ESCITALOPRAM OXALATE 10 MG/1
10 TABLET ORAL DAILY
Qty: 90 | Refills: 1 | Status: ACTIVE | COMMUNITY
Start: 1900-01-01 | End: 1900-01-01

## 2025-03-19 RX ORDER — DONEPEZIL HYDROCHLORIDE 10 MG/1
10 TABLET ORAL DAILY
Qty: 90 | Refills: 1 | Status: ACTIVE | COMMUNITY
Start: 1900-01-01 | End: 1900-01-01

## 2025-03-23 ENCOUNTER — INPATIENT (INPATIENT)
Facility: HOSPITAL | Age: 70
LOS: 4 days | Discharge: NURSING, REHAB OR LONG-TERM CARE | DRG: 340 | End: 2025-03-28
Attending: HOSPITALIST | Admitting: INTERNAL MEDICINE
Payer: MEDICAID

## 2025-03-23 VITALS
RESPIRATION RATE: 19 BRPM | OXYGEN SATURATION: 99 % | WEIGHT: 171.96 LBS | TEMPERATURE: 98 F | HEART RATE: 64 BPM | SYSTOLIC BLOOD PRESSURE: 118 MMHG | DIASTOLIC BLOOD PRESSURE: 67 MMHG

## 2025-03-23 LAB
ALBUMIN SERPL ELPH-MCNC: 4.2 G/DL — SIGNIFICANT CHANGE UP (ref 3.5–5.2)
ALP SERPL-CCNC: 59 U/L — SIGNIFICANT CHANGE UP (ref 30–115)
ALT FLD-CCNC: 14 U/L — SIGNIFICANT CHANGE UP (ref 0–41)
ANION GAP SERPL CALC-SCNC: 8 MMOL/L — SIGNIFICANT CHANGE UP (ref 7–14)
APTT BLD: 28.8 SEC — SIGNIFICANT CHANGE UP (ref 27–39.2)
AST SERPL-CCNC: 18 U/L — SIGNIFICANT CHANGE UP (ref 0–41)
BASOPHILS # BLD AUTO: 0.07 K/UL — SIGNIFICANT CHANGE UP (ref 0–0.2)
BASOPHILS NFR BLD AUTO: 1 % — SIGNIFICANT CHANGE UP (ref 0–1)
BILIRUB SERPL-MCNC: 0.2 MG/DL — SIGNIFICANT CHANGE UP (ref 0.2–1.2)
BUN SERPL-MCNC: 18 MG/DL — SIGNIFICANT CHANGE UP (ref 10–20)
CALCIUM SERPL-MCNC: 9.2 MG/DL — SIGNIFICANT CHANGE UP (ref 8.4–10.5)
CHLORIDE SERPL-SCNC: 100 MMOL/L — SIGNIFICANT CHANGE UP (ref 98–110)
CO2 SERPL-SCNC: 28 MMOL/L — SIGNIFICANT CHANGE UP (ref 17–32)
CREAT SERPL-MCNC: 0.9 MG/DL — SIGNIFICANT CHANGE UP (ref 0.7–1.5)
EGFR: 68 ML/MIN/1.73M2 — SIGNIFICANT CHANGE UP
EGFR: 68 ML/MIN/1.73M2 — SIGNIFICANT CHANGE UP
EOSINOPHIL # BLD AUTO: 0.12 K/UL — SIGNIFICANT CHANGE UP (ref 0–0.7)
EOSINOPHIL NFR BLD AUTO: 1.7 % — SIGNIFICANT CHANGE UP (ref 0–8)
GLUCOSE SERPL-MCNC: 100 MG/DL — HIGH (ref 70–99)
HCT VFR BLD CALC: 36.6 % — LOW (ref 37–47)
HGB BLD-MCNC: 11.4 G/DL — LOW (ref 12–16)
IMM GRANULOCYTES NFR BLD AUTO: 0.3 % — SIGNIFICANT CHANGE UP (ref 0.1–0.3)
INR BLD: 0.91 RATIO — SIGNIFICANT CHANGE UP (ref 0.65–1.3)
LYMPHOCYTES # BLD AUTO: 1.81 K/UL — SIGNIFICANT CHANGE UP (ref 1.2–3.4)
LYMPHOCYTES # BLD AUTO: 25.2 % — SIGNIFICANT CHANGE UP (ref 20.5–51.1)
MCHC RBC-ENTMCNC: 22.9 PG — LOW (ref 27–31)
MCHC RBC-ENTMCNC: 31.1 G/DL — LOW (ref 32–37)
MCV RBC AUTO: 73.5 FL — LOW (ref 81–99)
MONOCYTES # BLD AUTO: 0.58 K/UL — SIGNIFICANT CHANGE UP (ref 0.1–0.6)
MONOCYTES NFR BLD AUTO: 8.1 % — SIGNIFICANT CHANGE UP (ref 1.7–9.3)
NEUTROPHILS # BLD AUTO: 4.58 K/UL — SIGNIFICANT CHANGE UP (ref 1.4–6.5)
NEUTROPHILS NFR BLD AUTO: 63.7 % — SIGNIFICANT CHANGE UP (ref 42.2–75.2)
NRBC BLD AUTO-RTO: 0 /100 WBCS — SIGNIFICANT CHANGE UP (ref 0–0)
PLATELET # BLD AUTO: 221 K/UL — SIGNIFICANT CHANGE UP (ref 130–400)
PMV BLD: 10.5 FL — HIGH (ref 7.4–10.4)
POTASSIUM SERPL-MCNC: 4.8 MMOL/L — SIGNIFICANT CHANGE UP (ref 3.5–5)
POTASSIUM SERPL-SCNC: 4.8 MMOL/L — SIGNIFICANT CHANGE UP (ref 3.5–5)
PROT SERPL-MCNC: 6.6 G/DL — SIGNIFICANT CHANGE UP (ref 6–8)
PROTHROM AB SERPL-ACNC: 10.7 SEC — SIGNIFICANT CHANGE UP (ref 9.95–12.87)
RBC # BLD: 4.98 M/UL — SIGNIFICANT CHANGE UP (ref 4.2–5.4)
RBC # FLD: 15.7 % — HIGH (ref 11.5–14.5)
SODIUM SERPL-SCNC: 136 MMOL/L — SIGNIFICANT CHANGE UP (ref 135–146)
WBC # BLD: 7.18 K/UL — SIGNIFICANT CHANGE UP (ref 4.8–10.8)
WBC # FLD AUTO: 7.18 K/UL — SIGNIFICANT CHANGE UP (ref 4.8–10.8)

## 2025-03-23 PROCEDURE — 99285 EMERGENCY DEPT VISIT HI MDM: CPT

## 2025-03-23 PROCEDURE — 71045 X-RAY EXAM CHEST 1 VIEW: CPT | Mod: 26

## 2025-03-23 PROCEDURE — 72170 X-RAY EXAM OF PELVIS: CPT | Mod: 26

## 2025-03-23 RX ADMIN — Medication 4 MILLIGRAM(S): at 21:46

## 2025-03-23 NOTE — ED ADULT TRIAGE NOTE - NS ED TRIAGE AVPU SCALE
Alert-The patient is alert, awake and responds to voice. The patient is oriented to time, place, and person. The triage nurse is able to obtain subjective information.
Spouse/Significant other

## 2025-03-23 NOTE — ED ADULT NURSE NOTE - OBJECTIVE STATEMENT
Aox4 Aox4 pt fell down stairs tonight and presents with L hip pain. "IV placed, labs sent and meds given per provider. With son at bedside, denies hitting head or LOC

## 2025-03-23 NOTE — ED PROVIDER NOTE - CLINICAL SUMMARY MEDICAL DECISION MAKING FREE TEXT BOX
70-year-old female presenting with mechanical trip and fall down 3 stairs.  No head strike.  Patient only reporting left leg pain.  Is not having any abdominal pain chest pain nausea vomiting.  Given patient's elderly pan scanned patient to evaluate for fractures.  Remind patient of the pelvis x-ray there is no obvious fracture however on the CT scan radiologist noted an intratrochanteric femur fracture.  Discussed the case with orthopedics who recommended admission to medicine and add on for operative repair tomorrow

## 2025-03-23 NOTE — ED PROVIDER NOTE - OBJECTIVE STATEMENT
Patient is a 70-year-old female PMH dementia coming to ED after fall.  Patient son bedside reports patient had mechanical fall down approximately 3 steps, fall was unwitnessed but patient complaining of pain to left hip and back of head.  Patient required assistance getting up, has not been ambulatory after fall.  Patient lives at home with son/family, ambulates without walker/assistance.  Denies dizziness, neck pain, LOC, AC use, chest pain, abdominal pain, difficulty breathing, nausea vomiting diarrhea constipation, unilateral numbness/weakness, other extremity injury/pain

## 2025-03-23 NOTE — ED ADULT NURSE NOTE - TEMPLATE
Fall
associates with children known to be involved with morbid, destructive or violent behavior or fantasy

## 2025-03-23 NOTE — ED PROVIDER NOTE - PATIENT'S GENDER IDENTITY
[Medical Office: ([facility])___] : at the medical office located in  [Family Member] : family member [Verbal consent obtained from patient] : the patient, [unfilled] Female [Home] : at home, [unfilled] , at the time of the visit. [FreeTextEntry1] : Mr. Baez presents in telehealth follow-up accompanied by his daughters and aide.  He offers no complaints.  He is able to stand but still unable to walk.  Hospitalized for Pseudomonas urinary tract infection with retention from January 20 to January 26 at [facility].  Has indwelling catheter, but no fever and urine is now clear.  He is seeing a new urologist, Dr. Ewing, in the near future.\par \par No c/o chest, throat,jaw, arm or upper back discomfort.  . No chest, throat, jaw, or arm discomfort. No dyspnea, orthopnea or PND.  No palpitations, dizziness or syncope.  No edema.\par \par No new focal neurologic symptoms, other than a mild right hand tremor.\par

## 2025-03-23 NOTE — ED ADULT NURSE REASSESSMENT NOTE - NS ED NURSE REASSESS COMMENT FT1
Pt assessed upon receiving report from previous RN. Pt currently a/o x4, c/o Fall down five steps and has left hip pain.. Bed alarm activated and hospital attire provided. Pt instructed to call for assistance when necessary. Pt comfort and safety measures maintained. Family at bedside.

## 2025-03-23 NOTE — ED PROVIDER NOTE - PHYSICAL EXAMINATION
CONST: Well appearing in NAD  EYES: EOMI, Sclera and conjunctiva clear.   ENT: No nasal discharge  NECK: Non-tender, FROM  CARD: Normal S1 S2; Normal rate and rhythm  RESP: Equal BS B/L, No wheezes, rhonchi or rales. No distress  GI: Soft, non-tender, non-distended.  MS: Normal ROM in all extremities. No midline spinal tenderness. TTP L hip  SKIN: Warm, dry, Good turgor  NEURO: A&Ox3, No focal deficits. Strength 5/5 with no sensory deficits

## 2025-03-24 DIAGNOSIS — S72.002A FRACTURE OF UNSPECIFIED PART OF NECK OF LEFT FEMUR, INITIAL ENCOUNTER FOR CLOSED FRACTURE: ICD-10-CM

## 2025-03-24 LAB
A1C WITH ESTIMATED AVERAGE GLUCOSE RESULT: 5.8 % — HIGH (ref 4–5.6)
ALBUMIN SERPL ELPH-MCNC: 4.2 G/DL — SIGNIFICANT CHANGE UP (ref 3.5–5.2)
ALP SERPL-CCNC: 62 U/L — SIGNIFICANT CHANGE UP (ref 30–115)
ALT FLD-CCNC: 15 U/L — SIGNIFICANT CHANGE UP (ref 0–41)
ANION GAP SERPL CALC-SCNC: 11 MMOL/L — SIGNIFICANT CHANGE UP (ref 7–14)
AST SERPL-CCNC: 19 U/L — SIGNIFICANT CHANGE UP (ref 0–41)
BASOPHILS # BLD AUTO: 0.05 K/UL — SIGNIFICANT CHANGE UP (ref 0–0.2)
BASOPHILS NFR BLD AUTO: 0.5 % — SIGNIFICANT CHANGE UP (ref 0–1)
BILIRUB SERPL-MCNC: 0.3 MG/DL — SIGNIFICANT CHANGE UP (ref 0.2–1.2)
BLD GP AB SCN SERPL QL: SIGNIFICANT CHANGE UP
BLD GP AB SCN SERPL QL: SIGNIFICANT CHANGE UP
BUN SERPL-MCNC: 17 MG/DL — SIGNIFICANT CHANGE UP (ref 10–20)
CALCIUM SERPL-MCNC: 9.2 MG/DL — SIGNIFICANT CHANGE UP (ref 8.4–10.5)
CHLORIDE SERPL-SCNC: 98 MMOL/L — SIGNIFICANT CHANGE UP (ref 98–110)
CHOLEST SERPL-MCNC: 203 MG/DL — HIGH
CO2 SERPL-SCNC: 25 MMOL/L — SIGNIFICANT CHANGE UP (ref 17–32)
CREAT SERPL-MCNC: 0.8 MG/DL — SIGNIFICANT CHANGE UP (ref 0.7–1.5)
EGFR: 79 ML/MIN/1.73M2 — SIGNIFICANT CHANGE UP
EGFR: 79 ML/MIN/1.73M2 — SIGNIFICANT CHANGE UP
EOSINOPHIL # BLD AUTO: 0 K/UL — SIGNIFICANT CHANGE UP (ref 0–0.7)
EOSINOPHIL NFR BLD AUTO: 0 % — SIGNIFICANT CHANGE UP (ref 0–8)
ESTIMATED AVERAGE GLUCOSE: 120 MG/DL — HIGH (ref 68–114)
GLUCOSE SERPL-MCNC: 140 MG/DL — HIGH (ref 70–99)
HCT VFR BLD CALC: 37.9 % — SIGNIFICANT CHANGE UP (ref 37–47)
HDLC SERPL-MCNC: 78 MG/DL — SIGNIFICANT CHANGE UP
HGB BLD-MCNC: 11.9 G/DL — LOW (ref 12–16)
IMM GRANULOCYTES NFR BLD AUTO: 0.5 % — HIGH (ref 0.1–0.3)
IRON SATN MFR SERPL: 17 % — SIGNIFICANT CHANGE UP (ref 15–50)
IRON SATN MFR SERPL: 47 UG/DL — SIGNIFICANT CHANGE UP (ref 35–150)
LIPID PNL WITH DIRECT LDL SERPL: 116 MG/DL — HIGH
LYMPHOCYTES # BLD AUTO: 1.36 K/UL — SIGNIFICANT CHANGE UP (ref 1.2–3.4)
LYMPHOCYTES # BLD AUTO: 13.6 % — LOW (ref 20.5–51.1)
MAGNESIUM SERPL-MCNC: 1.9 MG/DL — SIGNIFICANT CHANGE UP (ref 1.8–2.4)
MCHC RBC-ENTMCNC: 22.9 PG — LOW (ref 27–31)
MCHC RBC-ENTMCNC: 31.4 G/DL — LOW (ref 32–37)
MCV RBC AUTO: 73 FL — LOW (ref 81–99)
MONOCYTES # BLD AUTO: 0.56 K/UL — SIGNIFICANT CHANGE UP (ref 0.1–0.6)
MONOCYTES NFR BLD AUTO: 5.6 % — SIGNIFICANT CHANGE UP (ref 1.7–9.3)
NEUTROPHILS # BLD AUTO: 8 K/UL — HIGH (ref 1.4–6.5)
NEUTROPHILS NFR BLD AUTO: 79.8 % — HIGH (ref 42.2–75.2)
NON HDL CHOLESTEROL: 125 MG/DL — SIGNIFICANT CHANGE UP
NRBC BLD AUTO-RTO: 0 /100 WBCS — SIGNIFICANT CHANGE UP (ref 0–0)
PLATELET # BLD AUTO: 203 K/UL — SIGNIFICANT CHANGE UP (ref 130–400)
PMV BLD: 10.4 FL — SIGNIFICANT CHANGE UP (ref 7.4–10.4)
POTASSIUM SERPL-MCNC: 4.3 MMOL/L — SIGNIFICANT CHANGE UP (ref 3.5–5)
POTASSIUM SERPL-SCNC: 4.3 MMOL/L — SIGNIFICANT CHANGE UP (ref 3.5–5)
PROT SERPL-MCNC: 6.9 G/DL — SIGNIFICANT CHANGE UP (ref 6–8)
RBC # BLD: 5.19 M/UL — SIGNIFICANT CHANGE UP (ref 4.2–5.4)
RBC # FLD: 15.7 % — HIGH (ref 11.5–14.5)
SODIUM SERPL-SCNC: 134 MMOL/L — LOW (ref 135–146)
TIBC SERPL-MCNC: 281 UG/DL — SIGNIFICANT CHANGE UP (ref 220–430)
TRANSFERRIN SERPL-MCNC: 238 MG/DL — SIGNIFICANT CHANGE UP (ref 200–360)
TRIGL SERPL-MCNC: 46 MG/DL — SIGNIFICANT CHANGE UP
TSH SERPL-MCNC: 0.59 UIU/ML — SIGNIFICANT CHANGE UP (ref 0.27–4.2)
UIBC SERPL-MCNC: 234 UG/DL — SIGNIFICANT CHANGE UP (ref 110–370)
WBC # BLD: 10.02 K/UL — SIGNIFICANT CHANGE UP (ref 4.8–10.8)
WBC # FLD AUTO: 10.02 K/UL — SIGNIFICANT CHANGE UP (ref 4.8–10.8)

## 2025-03-24 PROCEDURE — 97530 THERAPEUTIC ACTIVITIES: CPT | Mod: GP

## 2025-03-24 PROCEDURE — 27245 TREAT THIGH FRACTURE: CPT | Mod: LT

## 2025-03-24 PROCEDURE — 71260 CT THORAX DX C+: CPT | Mod: 26

## 2025-03-24 PROCEDURE — 84443 ASSAY THYROID STIM HORMONE: CPT

## 2025-03-24 PROCEDURE — 85027 COMPLETE CBC AUTOMATED: CPT

## 2025-03-24 PROCEDURE — 86901 BLOOD TYPING SEROLOGIC RH(D): CPT

## 2025-03-24 PROCEDURE — 83735 ASSAY OF MAGNESIUM: CPT

## 2025-03-24 PROCEDURE — 83036 HEMOGLOBIN GLYCOSYLATED A1C: CPT

## 2025-03-24 PROCEDURE — 70450 CT HEAD/BRAIN W/O DYE: CPT | Mod: 26

## 2025-03-24 PROCEDURE — 97166 OT EVAL MOD COMPLEX 45 MIN: CPT | Mod: GO

## 2025-03-24 PROCEDURE — 80061 LIPID PANEL: CPT

## 2025-03-24 PROCEDURE — 72125 CT NECK SPINE W/O DYE: CPT | Mod: 26

## 2025-03-24 PROCEDURE — 97163 PT EVAL HIGH COMPLEX 45 MIN: CPT | Mod: GP

## 2025-03-24 PROCEDURE — 74177 CT ABD & PELVIS W/CONTRAST: CPT | Mod: 26

## 2025-03-24 PROCEDURE — 97116 GAIT TRAINING THERAPY: CPT | Mod: GP

## 2025-03-24 PROCEDURE — 83550 IRON BINDING TEST: CPT

## 2025-03-24 PROCEDURE — C1713: CPT

## 2025-03-24 PROCEDURE — 83540 ASSAY OF IRON: CPT

## 2025-03-24 PROCEDURE — 86850 RBC ANTIBODY SCREEN: CPT

## 2025-03-24 PROCEDURE — 73560 X-RAY EXAM OF KNEE 1 OR 2: CPT | Mod: LT

## 2025-03-24 PROCEDURE — 84466 ASSAY OF TRANSFERRIN: CPT

## 2025-03-24 PROCEDURE — 93010 ELECTROCARDIOGRAM REPORT: CPT

## 2025-03-24 PROCEDURE — 80053 COMPREHEN METABOLIC PANEL: CPT

## 2025-03-24 PROCEDURE — 36415 COLL VENOUS BLD VENIPUNCTURE: CPT

## 2025-03-24 PROCEDURE — 73552 X-RAY EXAM OF FEMUR 2/>: CPT | Mod: LT

## 2025-03-24 PROCEDURE — 73700 CT LOWER EXTREMITY W/O DYE: CPT | Mod: 26,LT

## 2025-03-24 PROCEDURE — 86900 BLOOD TYPING SEROLOGIC ABO: CPT

## 2025-03-24 PROCEDURE — 85025 COMPLETE CBC W/AUTO DIFF WBC: CPT

## 2025-03-24 PROCEDURE — 73552 X-RAY EXAM OF FEMUR 2/>: CPT | Mod: 26,LT

## 2025-03-24 PROCEDURE — 80048 BASIC METABOLIC PNL TOTAL CA: CPT

## 2025-03-24 PROCEDURE — 73560 X-RAY EXAM OF KNEE 1 OR 2: CPT | Mod: 26,LT

## 2025-03-24 PROCEDURE — 73502 X-RAY EXAM HIP UNI 2-3 VIEWS: CPT | Mod: LT

## 2025-03-24 PROCEDURE — 82728 ASSAY OF FERRITIN: CPT

## 2025-03-24 PROCEDURE — 99223 1ST HOSP IP/OBS HIGH 75: CPT

## 2025-03-24 RX ORDER — SODIUM CHLORIDE 9 G/1000ML
1000 INJECTION, SOLUTION INTRAVENOUS
Refills: 0 | Status: DISCONTINUED | OUTPATIENT
Start: 2025-03-24 | End: 2025-03-24

## 2025-03-24 RX ORDER — B1/B2/B3/B5/B6/B12/VIT C/FOLIC 500-0.5 MG
1 TABLET ORAL DAILY
Refills: 0 | Status: DISCONTINUED | OUTPATIENT
Start: 2025-03-24 | End: 2025-03-24

## 2025-03-24 RX ORDER — ENOXAPARIN SODIUM 100 MG/ML
40 INJECTION SUBCUTANEOUS ONCE
Refills: 0 | Status: COMPLETED | OUTPATIENT
Start: 2025-03-24 | End: 2025-03-24

## 2025-03-24 RX ORDER — TRAMADOL HYDROCHLORIDE 50 MG/1
50 TABLET, FILM COATED ORAL EVERY 6 HOURS
Refills: 0 | Status: DISCONTINUED | OUTPATIENT
Start: 2025-03-24 | End: 2025-03-24

## 2025-03-24 RX ORDER — DONEPEZIL HYDROCHLORIDE 5 MG/1
10 TABLET ORAL AT BEDTIME
Refills: 0 | Status: DISCONTINUED | OUTPATIENT
Start: 2025-03-24 | End: 2025-03-28

## 2025-03-24 RX ORDER — CEFAZOLIN SODIUM IN 0.9 % NACL 3 G/100 ML
2000 INTRAVENOUS SOLUTION, PIGGYBACK (ML) INTRAVENOUS EVERY 8 HOURS
Refills: 0 | Status: DISCONTINUED | OUTPATIENT
Start: 2025-03-24 | End: 2025-03-24

## 2025-03-24 RX ORDER — ONDANSETRON HCL/PF 4 MG/2 ML
4 VIAL (ML) INJECTION ONCE
Refills: 0 | Status: DISCONTINUED | OUTPATIENT
Start: 2025-03-24 | End: 2025-03-24

## 2025-03-24 RX ORDER — ESCITALOPRAM OXALATE 20 MG/1
5 TABLET ORAL DAILY
Refills: 0 | Status: DISCONTINUED | OUTPATIENT
Start: 2025-03-24 | End: 2025-03-28

## 2025-03-24 RX ORDER — B1/B2/B3/B5/B6/B12/VIT C/FOLIC 500-0.5 MG
1 TABLET ORAL DAILY
Refills: 0 | Status: DISCONTINUED | OUTPATIENT
Start: 2025-03-24 | End: 2025-03-28

## 2025-03-24 RX ORDER — ENOXAPARIN SODIUM 100 MG/ML
40 INJECTION SUBCUTANEOUS EVERY 24 HOURS
Refills: 0 | Status: DISCONTINUED | OUTPATIENT
Start: 2025-03-24 | End: 2025-03-28

## 2025-03-24 RX ORDER — DONEPEZIL HYDROCHLORIDE 5 MG/1
10 TABLET ORAL AT BEDTIME
Refills: 0 | Status: DISCONTINUED | OUTPATIENT
Start: 2025-03-24 | End: 2025-03-24

## 2025-03-24 RX ORDER — ACETAMINOPHEN 500 MG/5ML
650 LIQUID (ML) ORAL EVERY 6 HOURS
Refills: 0 | Status: DISCONTINUED | OUTPATIENT
Start: 2025-03-24 | End: 2025-03-24

## 2025-03-24 RX ORDER — CEFAZOLIN SODIUM IN 0.9 % NACL 3 G/100 ML
2000 INTRAVENOUS SOLUTION, PIGGYBACK (ML) INTRAVENOUS EVERY 8 HOURS
Refills: 0 | Status: COMPLETED | OUTPATIENT
Start: 2025-03-24 | End: 2025-03-25

## 2025-03-24 RX ORDER — ACETAMINOPHEN 500 MG/5ML
650 LIQUID (ML) ORAL EVERY 6 HOURS
Refills: 0 | Status: DISCONTINUED | OUTPATIENT
Start: 2025-03-24 | End: 2025-03-28

## 2025-03-24 RX ORDER — OXYCODONE HYDROCHLORIDE AND ACETAMINOPHEN 10; 325 MG/1; MG/1
1 TABLET ORAL EVERY 6 HOURS
Refills: 0 | Status: DISCONTINUED | OUTPATIENT
Start: 2025-03-24 | End: 2025-03-24

## 2025-03-24 RX ORDER — ESCITALOPRAM OXALATE 20 MG/1
5 TABLET ORAL DAILY
Refills: 0 | Status: DISCONTINUED | OUTPATIENT
Start: 2025-03-24 | End: 2025-03-24

## 2025-03-24 RX ADMIN — Medication 100 MILLIGRAM(S): at 20:17

## 2025-03-24 RX ADMIN — ENOXAPARIN SODIUM 40 MILLIGRAM(S): 100 INJECTION SUBCUTANEOUS at 18:30

## 2025-03-24 RX ADMIN — ENOXAPARIN SODIUM 40 MILLIGRAM(S): 100 INJECTION SUBCUTANEOUS at 03:09

## 2025-03-24 RX ADMIN — Medication 4 MILLIGRAM(S): at 01:00

## 2025-03-24 RX ADMIN — Medication 1 MILLIGRAM(S): at 04:30

## 2025-03-24 RX ADMIN — DONEPEZIL HYDROCHLORIDE 10 MILLIGRAM(S): 5 TABLET ORAL at 21:15

## 2025-03-24 RX ADMIN — SODIUM CHLORIDE 75 MILLILITER(S): 9 INJECTION, SOLUTION INTRAVENOUS at 03:09

## 2025-03-24 RX ADMIN — Medication 4 MILLIGRAM(S): at 02:59

## 2025-03-24 RX ADMIN — Medication 4 MILLIGRAM(S): at 01:05

## 2025-03-24 NOTE — CHART NOTE - NSCHARTNOTEFT_GEN_A_CORE
PACU ANESTHESIA ADMISSION NOTE      Procedure:   Post op diagnosis:      ____  Intubated  TV:______       Rate: ______      FiO2: ______    __x__  Patent Airway    __x__  Full return of protective reflexes    __x__  Full recovery from anesthesia / back to baseline status    Vitals  HR: 66  BP: 99/56  RR: 18  O2 Sat: 100%  Temp: 66    Mental Status:  ____ Awake   _____ Alert   __x___ Drowsy   _____ Sedated    Nausea/Vomiting:  __x__ NO  ______Yes,   See Post - Op Orders          Pain Scale (0-10):  _____    Treatment: ____ None    __x__ See Post - Op/PCA Orders    Post - Operative Fluids:   ____ Oral   __x__ See Post - Op Orders    Plan: Discharge when criteria met:   ____Home       ___x__Floor     _____Critical Care   Other:_________________    Comments: Patient had smooth intraoperative event, no anesthesia complication.
Patient seen and examined today at bedside with family member at bedside. Patient pain under control. Plan for L ORIF today with orthopedic surgery. Resume dvt ppx after procedure, pain control, post-op abx, PT eval.

## 2025-03-24 NOTE — BRIEF OPERATIVE NOTE - NSICDXBRIEFPREOP_GEN_ALL_CORE_FT
PRE-OP DIAGNOSIS:  Displaced intertrochanteric fracture of left femur 24-Mar-2025 13:51:40  Jerrod Frazier

## 2025-03-24 NOTE — PATIENT PROFILE ADULT - INTERPRETER NAME
[facility]  [address]  [facility] [address]  Phone: [phone]                  Naveen Andrew Reyes   2017 9:40 AM   Clinical Support    Description:  Male : 2017   Provider:  [facility]   Department:  [facility]                To Do List           Future Appointments        Provider Department Dept Phone    2017 8:30 AM MD Lorenzo Mcintyre [facility] - Pediatrics [phone]    2017 9:40 AM PEDS, PULMONARY INJECTION Lorenzo Damon  [facility] [phone]      Goals (5 Years of Data)     None      [facility]     [facility] On Call Nurse Care Line -  Assistance  Registered nurses in the [facility] On Call Center provide clinical advisement, health education, appointment booking, and other advisory services.  Call for this free service at [phone].             Medications                Verify that the below list of medications is an accurate representation of the medications you are currently taking.  If none reported, the list may be blank. If incorrect, please contact your healthcare provider. Carry this list with you in case of emergency.           Current Medications     pediatric multivitamin-iron drops Take 1 mL by mouth once daily.    SYNAGIS 100 mg/mL injection     triamcinolone acetonide 0.1% (KENALOG) 0.1 % ointment APPLY AA BID FOR 5 DAYS           Clinical Reference Information           Vital Signs - Last Recorded  Most recent update: 2017  9:49 AM by Fay Real RN    Pulse Resp Wt SpO2          120 (!) 42 6.94 kg (15 lb 4.8 oz) (2 %, Z= -2.11)* 100%      *Growth percentiles are based on WHO (Boys, 0-2 years) data.      Allergies as of 2017     No Known Allergies      Immunizations Administered on Date of Encounter - 2017     None       Deny Tyler

## 2025-03-24 NOTE — CONSULT NOTE ADULT - ATTENDING COMMENTS
Orthopedic Trauma Attending  Patient seen and examined.  PMHx, Psurg Hx, Medications and Allergies reviewed.  X-rays and CT reviewed.  Agree with findings, assessment and plan.  Impression: Mildly cognitively impaired 71 yo woman with displaced IT hip fx. Recommend ORIF. Discussed injury and tx options at length.  Discussed prognosis.  Risks, benefits and alternative to surgical management of the patient's fracture were again reviewed at length with patient and her family, their questions answered to their satisfaction and they wish to proceed with proposed surgery today.

## 2025-03-24 NOTE — PRE PROCEDURE NOTE - PRE PROCEDURE EVALUATION
ORTHOPEDIC SURGERY PRE OP NOTE      Diagnosis: Left intertrochanteric hip fracture     Planned Procedure: Left hip open reduction internal fixation     Consent: TO BE OBTAINED BY ATTENDING                   Risks/benefits/alternatives were discussed with the patient/family and they wish to proceed with surgery.       ANTICIPATED DATE OF PROCEDURE : 3/24/25  SCHEDULED CASE OR ADD-ON CASE: Add on       Consent: To be obtained from proxy     Clearances:   [x] Medicine:       T(C): 36.5 (03-23-25 @ 20:54), Max: 36.5 (03-23-25 @ 20:54)  HR: 64 (03-23-25 @ 20:54) (64 - 64)  BP: 118/67 (03-23-25 @ 20:54) (118/67 - 118/67)  RR: 19 (03-23-25 @ 20:54) (19 - 19)  SpO2: 99% (03-23-25 @ 20:54) (99% - 99%)    Labs:                        11.9   10.02 )-----------( 203      ( 24 Mar 2025 04:59 )             37.9     03-24    134[L]  |  98  |  17  ----------------------------<  140[H]  4.3   |  25  |  0.8    Ca    9.2      24 Mar 2025 04:59  Mg     1.9     03-24    TPro  6.9  /  Alb  4.2  /  TBili  0.3  /  DBili  x   /  AST  19  /  ALT  15  /  AlkPhos  62  03-24    PT/INR - ( 23 Mar 2025 21:29 )   PT: 10.70 sec;   INR: 0.91 ratio         PTT - ( 23 Mar 2025 21:29 )  PTT:28.8 sec  Type and Screen X 2:      [x]EKG:   [x]CXR:       DIET: NPO after midnight  IVF: per primary team      ANTICOAGULATION STATUS ( include name of anticoagulant) :  Patient may receive DVT ppx prior to OR                                           A/P: Patient is a 70y y/o Female Pending left hip ORIF tomorrow    [ ] -NPO and IVF @ midnight  [ ]pain control/analgesia prn per primary team   [ ]Incentive Spirometry   [ ]F/U Clearance  [ ]F/U Pending Labs  [ ]Notify Ortho with any questions- spectra [phone]    [ ]DISCUSSED WITH PRIMARY TEAM MEMBER (name of team member): Jimy  [ ]Date and Time DISCUSSED WITH PRIMARY TEAM MEMBER: 3/24 1:50AM

## 2025-03-24 NOTE — BRIEF OPERATIVE NOTE - ANESTHESIOLOGIST NAME
ED Visit Note


First contact with patient:  01:54


This 18-year-old female presents to the ER for retained foreign body of tampon.

  Patient removed it as she was waiting to be seen.  Patient has no medical 

concerns and is requesting to leave.  Patient states he was in for 5 hours.  

Patient denies any other medical complaints.  Patient was discharged in stable 

condition with her friend.





Patient denies any past medical history.





Social history: Patient denies any drug use.





Diagnosis: Retained tampon, removed by patient





Do not leave tampons in greater than 6 hours at a time.





Return to ER for fevers, pain, vaginal itching or discharge, worsening signs or 

symptoms or as needed.


Current/Historical Medications


Unable to Obtain Active Prescriptions or Reported Meds





Vital Signs











  Date Time  Temp Pulse Resp B/P (MAP) Pulse Ox O2 Delivery O2 Flow Rate FiO2


 


8/24/17 02:08  113 18 133/74 96   


 


8/24/17 01:50 37.1 113 18 133/74 96 Room Air  











Departure Information


Prescriptions





Unable to Obtain Active Prescriptions or Reported Meds





Referrals


No Doctor, Assigned (PCP)





Patient Instructions


My [facility] per Dept

## 2025-03-24 NOTE — PROGRESS NOTE ADULT - ASSESSMENT
POST OP NOTE     S/P LEFT HIP ORIF POD 0   pt seen at bedside, doing well post op no complaints pain controlled     Vital Signs Last 24 Hrs  T(C): 36.3 (24 Mar 2025 13:43), Max: 36.8 (24 Mar 2025 07:47)  T(F): 97.4 (24 Mar 2025 13:43), Max: 98.3 (24 Mar 2025 07:47)  HR: 69 (24 Mar 2025 17:00) (63 - 79)  BP: 134/61 (24 Mar 2025 17:00) (94/50 - 134/61)  BP(mean): --  RR: 20 (24 Mar 2025 17:00) (15 - 20)  SpO2: 100% (24 Mar 2025 17:00) (98% - 100%)                          11.9   10.02 )-----------( 203      ( 24 Mar 2025 04:59 )             37.9     left hip:   dressing in place c/d/i   nvid df pf intact   calf soft nt         s/p left hip orif pod 0     pain control   dvt proph   am labs   pt ot   incentive   abx 24 hours   medical management   dispo planning  POST OP NOTE     S/P LEFT HIP ORIF POD 0   pt seen at bedside, doing well post op no complaints pain controlled     Vital Signs Last 24 Hrs  T(C): 36.3 (24 Mar 2025 13:43), Max: 36.8 (24 Mar 2025 07:47)  T(F): 97.4 (24 Mar 2025 13:43), Max: 98.3 (24 Mar 2025 07:47)  HR: 69 (24 Mar 2025 17:00) (63 - 79)  BP: 134/61 (24 Mar 2025 17:00) (94/50 - 134/61)  BP(mean): --  RR: 20 (24 Mar 2025 17:00) (15 - 20)  SpO2: 100% (24 Mar 2025 17:00) (98% - 100%)                          11.9   10.02 )-----------( 203      ( 24 Mar 2025 04:59 )             37.9     left hip:   dressing in place c/d/i   nvid df pf intact   calf soft nt         s/p left hip orif pod 0     pain control   dvt proph - Patient should be discharged on 6 weeks (from surgery date) of appropriate DVT prophylaxis due to their decreased functional/ambulation status after lower extremity surgery. Orthopaedic preference would be Aspirin 81 mg BID  if there are no contraindications. If patient is already on home anticoagulation, they may continue home anticoagulation medication instead of aspirin. If patient is going to inpatient rehab/nursing facility, and they plan for DVT PPx with SQH/LVX, they may be placed on that instead of aspirin.  am labs   pt ot   incentive   abx 24 hours   medical management   dispo planning

## 2025-03-24 NOTE — H&P ADULT - ASSESSMENT
#Left intertrochanteric hip fracture  #Mechanical fall  -XR pelvis: Minimally displaced intertrochanteric hip fracture   -s/p morphine 4mg IV x2  Plan  -Orthopedic surgery assessed in ED: added on for left hip intermedullary nailing today  -NPO, IV fluids running at 75 cc/hr  -NWB LLE  -                       #Left intertrochanteric hip fracture  #Mechanical fall  -XR pelvis: Minimally displaced intertrochanteric hip fracture   -s/p morphine 4mg IV x2  Plan  -Orthopedic surgery assessed in ED: added on for left hip intermedullary nailing today  -NPO, IV fluids running at 75 cc/hr  -NWB LLE  -    #Microcytic anemia  -Hgb 11.1 (no baseline), MCV 73.5  Plan  -Follow up AM iron studies  -Keep Hgb >7, active type and screen    DVT PPX: Lovenox, on hold  GI PPX: Protonix  DIET: NPO for now  ACTIVITY: NWB of LLE  CODE STATUS:   DISPOSITION: Acute    PENDING: Left hip intermedullary nailing today                   69 yo F with pmhx of dementia, bilateral cataracts comes in to the ED after a fall. Son at bedside. He states pt was walking down stairs and slipped on her socks while descending the final 3 steps, fell on her side, did not hit her head, no LOC. (-) palpitations, vertigo, SOB, chest pain.  Pt can climb 2 flights of stairs without SOB or chest pain. No personal or family history of HTN, DM, CAD.   Never smoker, no alcohol use, no drug use.     Vitals: Unremarkable; T97.7, HR 64, /67, SpO2 99 on RA.  Labs: Hgb 11.4 (baseline unknown), MCV 73.5, otherwise unremarkable.    Imaging:   CTH negative  CT Cspine negative  CTAP: Acute, impacted left femoral intertrochanteric fracture. Sacral Tarlov cysts.  CT LLE: Acute, impacted left femoral intertrochanteric fracture.Soft tissues unremarkable. No hip dislocation. Degenerative change of left hip and left knee.    S/p fluids and morphine 4 mg IVx2 in ED.    Admitted to medicine for further management     #Left intertrochanteric hip fracture  #Mechanical fall  -XR pelvis: Minimally displaced intertrochanteric hip fracture  -Pt ambulates without assistance at baseline   -s/p morphine 4mg IV x2  Plan  -Orthopedic surgery assessed in ED: added on for left hip intermedullary nailing today  -NPO, IV fluids running at 75 cc/hr  -NWB LLE  -Tylenol 650 mg PO Q6H PRN for mild pain  -Tramadol 50 mg PO Q6H PRN for moderate pain  -Oxycodone 5 mg PO Q6H PRN for severe pain    #Microcytic anemia  -Hgb 11.1 (no baseline), MCV 73.5  -FHx of anemia  Plan  -Follow up AM iron studies  -Keep Hgb >7, active type and screen    #Dementia  -Memory decline  -A&Ox3 at baseline and on my assessment at bedside  Plan  -C/w donepezil     DVT PPX: Lovenox, on hold  GI PPX: Protonix  DIET: NPO for now  ACTIVITY: NWB of LLE  CODE STATUS:   DISPOSITION: Acute    PENDING: Left hip intermedullary nailing today                       69 yo F with pmhx of dementia, bilateral cataracts comes in to the ED after a fall. Son at bedside. He states pt was walking down stairs and slipped on her socks while descending the final 3 steps, fell on her side, did not hit her head, no LOC. (-) palpitations, vertigo, SOB, chest pain.  Pt can climb 2 flights of stairs without SOB or chest pain. No personal or family history of HTN, DM, CAD.   Never smoker, no alcohol use, no drug use.     Vitals: Unremarkable; T97.7, HR 64, /67, SpO2 99 on RA.  Labs: Hgb 11.4 (baseline unknown), MCV 73.5, otherwise unremarkable.    Imaging:   CTH negative  CT Cspine negative  CTAP: Acute, impacted left femoral intertrochanteric fracture. Sacral Tarlov cysts.  CT LLE: Acute, impacted left femoral intertrochanteric fracture.Soft tissues unremarkable. No hip dislocation. Degenerative change of left hip and left knee.    S/p fluids and morphine 4 mg IVx2 in ED.    Admitted to medicine for further management     #Left intertrochanteric hip fracture  #Mechanical fall  -XR pelvis: Minimally displaced intertrochanteric hip fracture  -CT AP and LLE: Acute, impacted left femoral intertrochanteric fracture.  -Pt ambulates without assistance at baseline   -s/p morphine 4mg IV x2  Plan  -Orthopedic surgery assessed in ED: added on for left hip intermedullary nailing today  -NPO, IV fluids running at 75 cc/hr  -NWB LLE  -Tylenol 650 mg PO Q6H PRN for mild pain  -Tramadol 50 mg PO Q6H PRN for moderate pain  -Oxycodone 5 mg PO Q6H PRN for severe pain    #Microcytic anemia  -Hgb 11.1 (no baseline), MCV 73.5  -FHx of anemia  Plan  -Follow up AM iron studies  -Keep Hgb >7, active type and screen    #Dementia  -Memory decline  -A&Ox3 at baseline and on my assessment at bedside  Plan  -C/w donepezil     DVT PPX: Lovenox, on hold  GI PPX: Protonix  DIET: NPO for now  ACTIVITY: NWB of LLE  CODE STATUS:   DISPOSITION: Acute    PENDING: Left hip intermedullary nailing today, iron studies

## 2025-03-24 NOTE — BRIEF OPERATIVE NOTE - OPERATION/FINDINGS
above; post op WBAT, Abx and DVT ppx per protocol  Dict:  Implants: Gamma IV CMN (10  170mm; 130 deg); 90 mm SHS; 5x 35mm interlocking screw above; post op WBAT, Abx and DVT ppx per protocol  Dict:[id number]  Implants: Gamma IV CMN (10  170mm; 130 deg); 90 mm SHS; 5x 35mm interlocking screw

## 2025-03-24 NOTE — H&P ADULT - NSHPLABSRESULTS_GEN_ALL_CORE
LABS:                        11.4   7.18  )-----------( 221      ( 23 Mar 2025 21:29 )             36.6     03-23    136  |  100  |  18  ----------------------------<  100[H]  4.8   |  28  |  0.9    Ca    9.2      23 Mar 2025 21:29    TPro  6.6  /  Alb  4.2  /  TBili  0.2  /  DBili  x   /  AST  18  /  ALT  14  /  AlkPhos  59  03-23    PT/INR - ( 23 Mar 2025 21:29 )   PT: 10.70 sec;   INR: 0.91 ratio         PTT - ( 23 Mar 2025 21:29 )  PTT:28.8 sec  Urinalysis Basic - ( 23 Mar 2025 21:29 )    Color: x / Appearance: x / SG: x / pH: x  Gluc: 100 mg/dL / Ketone: x  / Bili: x / Urobili: x   Blood: x / Protein: x / Nitrite: x   Leuk Esterase: x / RBC: x / WBC x   Sq Epi: x / Non Sq Epi: x / Bacteria: x LABS:                        11.4   7.18  )-----------( 221      ( 23 Mar 2025 21:29 )             36.6     03-23    136  |  100  |  18  ----------------------------<  100[H]  4.8   |  28  |  0.9    Ca    9.2      23 Mar 2025 21:29    TPro  6.6  /  Alb  4.2  /  TBili  0.2  /  DBili  x   /  AST  18  /  ALT  14  /  AlkPhos  59  03-23    PT/INR - ( 23 Mar 2025 21:29 )   PT: 10.70 sec;   INR: 0.91 ratio         PTT - ( 23 Mar 2025 21:29 )  PTT:28.8 sec    Radiology    COMPARISON: CT chest 4/4/2022, CT abdomen and pelvis 2/15/2015.      FINDINGS:    CHEST:    LUNGS, PLEURA, AIRWAYS: No lobar consolidation, mass, effusion, or   pneumothorax. No evidence of central endobronchial obstruction. No   bronchiectasis or honeycombing. No suspicious pulmonary nodule. Bilateral   subsegmental atelectasis.    THORACIC NODES: No mediastinal, hilar, supraclavicular, or axillary   lymphadenopathy.    MEDIASTINUM/GREAT VESSELS: No pericardial effusion. Heart size is within   normal limits. The aorta and main pulmonary artery are of normal caliber.    ABDOMEN/PELVIS:    HEPATOBILIARY: Unremarkable.    SPLEEN: Unremarkable.    PANCREAS: Unremarkable.    ADRENAL GLANDS: Unremarkable.    KIDNEYS: Symmetric pattern of renal enhancement. No hydronephrosis   bilaterally. Right renal subcentimeter hypodensity, too small to   characterize.    ABDOMINOPELVIC NODES: No lymphadenopathy.    PELVIC ORGANS: Unremarkable.    PERITONEUM/MESENTERY/BOWEL: No bowel obstruction. No ascites or   pneumoperitoneum. Normal appendix.    BONES/SOFT TISSUES: Acute, impacted left femoral intertrochanteric   fracture. Sacral Tarlov cysts.    VASCULAR : Vascular calcifications.    IMPRESSION:  Acute, impacted left femoral intertrochanteric fracture.    --- End of Report ---    Comparison made with CT abdomen and pelvis 2/15/2015.    FINDINGS:    Acute, impacted left femoral intertrochanteric fracture.  Soft tissues unremarkable. No hip dislocation. Degenerative change of   left hip and left knee.  Please see separate report for evaluation of pelvis.    IMPRESSION:  Acute, impacted left femoral intertrochanteric fracture.    Spoke with JANES Camilo    --- End of Report ---

## 2025-03-24 NOTE — H&P ADULT - ATTENDING COMMENTS
71 yo F with PMH of Dementia comes in to the ED after a fall.     Agree  with assessment  except for changes below.     CTH negative  CT Cspine negative  CTAP: Acute, impacted left femoral intertrochanteric fracture. Sacral Tarlov cysts.  CT LLE: Acute, impacted left femoral intertrochanteric Fracture. Soft tissues unremarkable. No hip dislocation. Degenerative change of left hip and left knee.    Vital Signs Last 24 Hrs  T(C): 36.5 (23 Mar 2025 20:54), Max: 36.5 (23 Mar 2025 20:54)  T(F): 97.7 (23 Mar 2025 20:54), Max: 97.7 (23 Mar 2025 20:54)  HR: 64 (23 Mar 2025 20:54) (64 - 64)  BP: 118/67 (23 Mar 2025 20:54) (118/67 - 118/67)  BP(mean): --  RR: 19 (23 Mar 2025 20:54) (19 - 19)  SpO2: 99% (23 Mar 2025 20:54) (99% - 99%)    Parameters below as of 23 Mar 2025 20:54  Patient On (Oxygen Delivery Method): room air        IMPRESSION 71 yo F with PMH of Dementia comes in to the ED after a fall.     Agree  with assessment  except for changes below.     CTH negative  CT Cspine negative  CTAP: Acute, impacted left femoral intertrochanteric fracture. Sacral Tarlov cysts.  CT LLE: Acute, impacted left femoral intertrochanteric Fracture. Soft tissues unremarkable. No hip dislocation. Degenerative change of left hip and left knee.    Vital Signs Last 24 Hrs  T(C): 36.5 (23 Mar 2025 20:54), Max: 36.5 (23 Mar 2025 20:54)  T(F): 97.7 (23 Mar 2025 20:54), Max: 97.7 (23 Mar 2025 20:54)  HR: 64 (23 Mar 2025 20:54) (64 - 64)  BP: 118/67 (23 Mar 2025 20:54) (118/67 - 118/67)  BP(mean): --  RR: 19 (23 Mar 2025 20:54) (19 - 19)  SpO2: 99% (23 Mar 2025 20:54) (99% - 99%)    Parameters below as of 23 Mar 2025 20:54  Patient On (Oxygen Delivery Method): room air        IMPRESSION  Mechanical Fall Complicated by Left intertrochanteric Hip Fracture  Bed rest   Orthopedic  Following   Orthopedic surgery assessed in ED: added on for left hip intermedullary nailing today  NPO, IV fluids running at 75 cc/hr  NWB LLE  Tylenol 650 mg PO Q6H PRN for mild pain  Tramadol 50 mg PO Q6H PRN for moderate pain  Oxycodone 5 mg PO Q6H PRN for severe pain  Priop Labs     No history of MI, CVA, DM, or CKD. Hence, her RCRI score is 0 points indicating a Class I Risk, or a 3.9 % 30-day risk of death, MI, or cardiac arrest.  -METS > 4. He denies CP, SOB, palpitations, orthopnea. Patient is not clinically in heart failure. CXR is normal. No history of bleeding or reaction to anesthesia  -Patient is a low risk candidate for an intermediate risk procedure.     Please  Obtain  ECG  Prio  to optimization       Hx Dementia   delirium precautions such as regulating sleep wake cycle, constant reorientation by staff, opening blinds during the day, out of bed to chair as tolerated, avoiding medications that can worsen delirium such as anticholinergics, antihistamines, Benzodiazapines, Opiods.  Continue  Donepezil     Chronic Microcytic anemia  -Hgb 11.1 (no baseline), MCV 73.5  -FHx of anemia  -Follow up AM iron studies  -Keep Hgb >7, active type and screen  - Recommend  PRBC on Hold  for OR    Seen on 03/24

## 2025-03-24 NOTE — BRIEF OPERATIVE NOTE - NSICDXBRIEFPOSTOP_GEN_ALL_CORE_FT
POST-OP DIAGNOSIS:  Displaced intertrochanteric fracture of left femur 24-Mar-2025 13:51:46  Jerrod Frazier

## 2025-03-24 NOTE — BRIEF OPERATIVE NOTE - NSICDXBRIEFPROCEDURE_GEN_ALL_CORE_FT
PROCEDURES:  ORIF, fracture, femur, intertrochanteric, using Gamma nail 24-Mar-2025 13:51:20 CPT code 55831 Jerrod Frazier

## 2025-03-24 NOTE — H&P ADULT - HISTORY OF PRESENT ILLNESS
69 yo F with pmhx of  69 yo F with pmhx of dementia comes in to the ED after a fall.     Vitals: Unremarkable; T97.7, HR 64, /67, SpO2 99 on RA  Labs: Hgb 11.4 (baseline unknown), MCV 73.5, otherwise unremarkable.    Imaging:   CTH negative  CT Cspine negative  CTAP: Acute, impacted left femoral intertrochanteric fracture. Sacral Tarlov cysts.  CT LLE: Acute, impacted left femoral intertrochanteric fracture.Soft tissues unremarkable. No hip dislocation. Degenerative change of left hip and left knee.    S/p fluids and morphine 4 mg IVx2 in ED.    Admitted to medicine for further management  71 yo F with pmhx of dementia, bilateral cataracts comes in to the ED after a fall. Son at bedside. He states pt was walking down stairs and slipped on her socks while descending the final 3 steps, fell on her side, did not hit her head, no LOC. (-) palpitations, vertigo, SOB, chest pain.    Vitals: Unremarkable; T97.7, HR 64, /67, SpO2 99 on RA  Labs: Hgb 11.4 (baseline unknown), MCV 73.5, otherwise unremarkable.    Imaging:   CTH negative  CT Cspine negative  CTAP: Acute, impacted left femoral intertrochanteric fracture. Sacral Tarlov cysts.  CT LLE: Acute, impacted left femoral intertrochanteric fracture.Soft tissues unremarkable. No hip dislocation. Degenerative change of left hip and left knee.    S/p fluids and morphine 4 mg IVx2 in ED.    Admitted to medicine for further management

## 2025-03-24 NOTE — H&P ADULT - NSHPREVIEWOFSYSTEMS_GEN_ALL_CORE
(-) fevers, night sweats, cough, sore throat, vision changes, hearing changes, congestion, chest pain, palpitations, orthopnea, claudication, rashes, LE edema, appetite changes, weight loss or gain, LOC, dizziness.

## 2025-03-24 NOTE — H&P ADULT - NSHPPHYSICALEXAM_GEN_ALL_CORE
PHYSICAL EXAM:  GENERAL: NAD, well-groomed, well-developed  HEAD:  Atraumatic, Normocephalic  EYES: EOMI  NECK: Supple  NERVOUS SYSTEM:  Alert & Oriented X3, non focal   CHEST/LUNG: Clear to auscultation bilaterally; No rales, rhonchi, wheezing, or rubs  HEART: Regular rate and rhythm; No murmurs, rubs, or gallops  ABDOMEN: Soft, Nontender, Nondistended; Bowel sounds present  EXTREMITIES:  2+ Peripheral Pulses, No clubbing, cyanosis, or edema; LLE externally rotated, tenderness on light palpation of left hip  LYMPH: No lymphadenopathy noted  SKIN: No rashes or lesions

## 2025-03-24 NOTE — PATIENT PROFILE ADULT - FALL HARM RISK - HARM RISK INTERVENTIONS
Assistance with ambulation/Assistance OOB with selected safe patient handling equipment/Communicate Risk of Fall with Harm to all staff/Discuss with provider need for PT consult/Monitor gait and stability/Provide patient with walking aids - walker, cane, crutches/Reinforce activity limits and safety measures with patient and family/Sit up slowly, dangle for a short time, stand at bedside before walking/Tailored Fall Risk Interventions/Use of alarms - bed, chair and/or voice tab/Visual Cue: Yellow wristband and red socks/Bed in lowest position, wheels locked, appropriate side rails in place/Call bell, personal items and telephone in reach/Instruct patient to call for assistance before getting out of bed or chair/Non-slip footwear when patient is out of bed/[place] to call system/Physically safe environment - no spills, clutter or unnecessary equipment/Purposeful Proactive Rounding/Room/bathroom lighting operational, light cord in reach

## 2025-03-25 LAB
ALBUMIN SERPL ELPH-MCNC: 3.5 G/DL — SIGNIFICANT CHANGE UP (ref 3.5–5.2)
ALP SERPL-CCNC: 50 U/L — SIGNIFICANT CHANGE UP (ref 30–115)
ALT FLD-CCNC: 12 U/L — SIGNIFICANT CHANGE UP (ref 0–41)
ANION GAP SERPL CALC-SCNC: 8 MMOL/L — SIGNIFICANT CHANGE UP (ref 7–14)
AST SERPL-CCNC: 17 U/L — SIGNIFICANT CHANGE UP (ref 0–41)
BASOPHILS # BLD AUTO: 0.03 K/UL — SIGNIFICANT CHANGE UP (ref 0–0.2)
BASOPHILS NFR BLD AUTO: 0.4 % — SIGNIFICANT CHANGE UP (ref 0–1)
BILIRUB SERPL-MCNC: 0.3 MG/DL — SIGNIFICANT CHANGE UP (ref 0.2–1.2)
BUN SERPL-MCNC: 12 MG/DL — SIGNIFICANT CHANGE UP (ref 10–20)
CALCIUM SERPL-MCNC: 8.6 MG/DL — SIGNIFICANT CHANGE UP (ref 8.4–10.5)
CHLORIDE SERPL-SCNC: 97 MMOL/L — LOW (ref 98–110)
CO2 SERPL-SCNC: 29 MMOL/L — SIGNIFICANT CHANGE UP (ref 17–32)
CREAT SERPL-MCNC: 0.6 MG/DL — LOW (ref 0.7–1.5)
EGFR: 96 ML/MIN/1.73M2 — SIGNIFICANT CHANGE UP
EGFR: 96 ML/MIN/1.73M2 — SIGNIFICANT CHANGE UP
EOSINOPHIL # BLD AUTO: 0.22 K/UL — SIGNIFICANT CHANGE UP (ref 0–0.7)
EOSINOPHIL NFR BLD AUTO: 3 % — SIGNIFICANT CHANGE UP (ref 0–8)
FERRITIN SERPL-MCNC: 170 NG/ML — SIGNIFICANT CHANGE UP (ref 13–330)
GLUCOSE SERPL-MCNC: 117 MG/DL — HIGH (ref 70–99)
HCT VFR BLD CALC: 29.7 % — LOW (ref 37–47)
HGB BLD-MCNC: 9.4 G/DL — LOW (ref 12–16)
IMM GRANULOCYTES NFR BLD AUTO: 0.4 % — HIGH (ref 0.1–0.3)
LYMPHOCYTES # BLD AUTO: 1.5 K/UL — SIGNIFICANT CHANGE UP (ref 1.2–3.4)
LYMPHOCYTES # BLD AUTO: 20.3 % — LOW (ref 20.5–51.1)
MAGNESIUM SERPL-MCNC: 1.9 MG/DL — SIGNIFICANT CHANGE UP (ref 1.8–2.4)
MCHC RBC-ENTMCNC: 23.2 PG — LOW (ref 27–31)
MCHC RBC-ENTMCNC: 31.6 G/DL — LOW (ref 32–37)
MCV RBC AUTO: 73.3 FL — LOW (ref 81–99)
MONOCYTES # BLD AUTO: 0.7 K/UL — HIGH (ref 0.1–0.6)
MONOCYTES NFR BLD AUTO: 9.5 % — HIGH (ref 1.7–9.3)
NEUTROPHILS # BLD AUTO: 4.91 K/UL — SIGNIFICANT CHANGE UP (ref 1.4–6.5)
NEUTROPHILS NFR BLD AUTO: 66.4 % — SIGNIFICANT CHANGE UP (ref 42.2–75.2)
NRBC BLD AUTO-RTO: 0 /100 WBCS — SIGNIFICANT CHANGE UP (ref 0–0)
PLATELET # BLD AUTO: 169 K/UL — SIGNIFICANT CHANGE UP (ref 130–400)
PMV BLD: 11.1 FL — HIGH (ref 7.4–10.4)
POTASSIUM SERPL-MCNC: 4.7 MMOL/L — SIGNIFICANT CHANGE UP (ref 3.5–5)
POTASSIUM SERPL-SCNC: 4.7 MMOL/L — SIGNIFICANT CHANGE UP (ref 3.5–5)
PROT SERPL-MCNC: 5.7 G/DL — LOW (ref 6–8)
RBC # BLD: 4.05 M/UL — LOW (ref 4.2–5.4)
RBC # FLD: 15.8 % — HIGH (ref 11.5–14.5)
SODIUM SERPL-SCNC: 134 MMOL/L — LOW (ref 135–146)
WBC # BLD: 7.39 K/UL — SIGNIFICANT CHANGE UP (ref 4.8–10.8)
WBC # FLD AUTO: 7.39 K/UL — SIGNIFICANT CHANGE UP (ref 4.8–10.8)

## 2025-03-25 PROCEDURE — 99232 SBSQ HOSP IP/OBS MODERATE 35: CPT

## 2025-03-25 RX ADMIN — Medication 2 MILLIGRAM(S): at 21:30

## 2025-03-25 RX ADMIN — Medication 2 MILLIGRAM(S): at 10:52

## 2025-03-25 RX ADMIN — Medication 1 TABLET(S): at 10:52

## 2025-03-25 RX ADMIN — Medication 100 MILLIGRAM(S): at 04:26

## 2025-03-25 RX ADMIN — DONEPEZIL HYDROCHLORIDE 10 MILLIGRAM(S): 5 TABLET ORAL at 20:59

## 2025-03-25 RX ADMIN — ESCITALOPRAM OXALATE 5 MILLIGRAM(S): 20 TABLET ORAL at 10:52

## 2025-03-25 RX ADMIN — Medication 2 MILLIGRAM(S): at 21:00

## 2025-03-25 RX ADMIN — ENOXAPARIN SODIUM 40 MILLIGRAM(S): 100 INJECTION SUBCUTANEOUS at 17:33

## 2025-03-25 NOTE — PHYSICAL THERAPY INITIAL EVALUATION ADULT - PERTINENT HX OF CURRENT PROBLEM, REHAB EVAL
71 yo F with pmhx of dementia, bilateral cataracts comes in to the ED after a fall. Son at bedside. He states pt was walking down stairs and slipped on her socks while descending the final 3 steps, fell on her side, did not hit her head, no LOC. (-) palpitations, vertigo, SOB, chest pain.  Pt can climb 2 flights of stairs without SOB or chest pain. No personal or family history of HTN, DM, CAD.   Never smoker, no alcohol use, no drug use. Pt sustained Acute, impacted left femoral intertrochanteric fracture; now s/p ORIF POD 1. Pt. referred to PT for eval and tx.

## 2025-03-25 NOTE — PROGRESS NOTE ADULT - ASSESSMENT
71 yo F with pmhx of dementia, bilateral cataracts presenting for Left intertrochanteric hip fracture s/p mechanical fall     #Left intertrochanteric hip fracture  #Mechanical fall  -XR pelvis: Minimally displaced intertrochanteric hip fracture  -CT AP and LLE: Acute, impacted left femoral intertrochanteric fracture.  -Pt ambulates without assistance at baseline   -s/p morphine 4mg IV x2  -Tylenol 650 mg PO Q6H PRN for mild pain  -Tramadol 50 mg PO Q6H PRN for moderate pain  -Oxycodone 5 mg PO Q6H PRN for severe pain  -s/p ORIF  -Pain control   -WBAT  -Incentive spirometery   - HD stable  -PT/OT/Physiatry eval    #Microcytic anemia  -Hgb 11.1 (no baseline), MCV 73.5  -FHx of anemia  -Iron studies WNL  -Keep Hgb >7, active type and screen    #Dementia  -Memory decline  -A&Ox3 at baseline and on my assessment at bedside  Plan  -C/w donepezil     DVT PPX: Lovenox, on hold  GI PPX: Protonix  DIET: NPO for now  ACTIVITY: NWB of LLE  CODE STATUS:   DISPOSITION:

## 2025-03-25 NOTE — PROGRESS NOTE ADULT - ASSESSMENT
69 yo F with pmhx of dementia, bilateral cataracts presenting for Left intertrochanteric hip fracture s/p mechanical fall     #Left intertrochanteric hip fracture secondary to Mechanical fall  -s/p ORIF  - pain controlled   -cw morphine and oxycodone prn   - pt and physiatry eval     #Microcytic anemia  hemoglobin stable     #Dementia  -C/w donepezil     #Hyponatremia no intervention for now     #Acute blood loss anemia on chronic anemia secondary to post operative blood loss- no indication for intervention     PROGRESS NOTE HANDOFF    Pending:  pt rehab     Family discussion:  patient verbalized understanding and agreeable to plan of care , spoke with daughter in law at bedside     Disposition: TBD based on ambulation post op

## 2025-03-25 NOTE — PROGRESS NOTE ADULT - SUBJECTIVE AND OBJECTIVE BOX
Orthopaedic Surgery Progress Note    S&E at bedside this AM. Pain well controlled    T(C): 36.3 (03-24-25 @ 23:39), Max: 36.9 (03-24-25 @ 19:56)  HR: 66 (03-24-25 @ 23:39) (63 - 79)  BP: 138/66 (03-24-25 @ 23:39) (94/50 - 138/66)  RR: 18 (03-24-25 @ 23:39) (15 - 20)  SpO2: 98% (03-24-25 @ 23:39) (98% - 100%)    P/E:  Alert and oriented to baseline NAD  Nonlabored breathing      LLE  Dressings in place, c/d/i  SILT sp/dp/t/sural/saph  Firing ta/ehl/fhl/gs  Foot WWP, cap refill <2s     Labs                        11.9   10.02 )-----------( 203      ( 24 Mar 2025 04:59 )             37.9     03-24    134[L]  |  98  |  17  ----------------------------<  140[H]  4.3   |  25  |  0.8    Ca    9.2      24 Mar 2025 04:59  Mg     1.9     03-24    TPro  6.9  /  Alb  4.2  /  TBili  0.3  /  DBili  x   /  AST  19  /  ALT  15  /  AlkPhos  62  03-24    LIVER FUNCTIONS - ( 24 Mar 2025 04:59 )  Alb: 4.2 g/dL / Pro: 6.9 g/dL / ALK PHOS: 62 U/L / ALT: 15 U/L / AST: 19 U/L / GGT: x           PT/INR - ( 23 Mar 2025 21:29 )   PT: 10.70 sec;   INR: 0.91 ratio         PTT - ( 23 Mar 2025 21:29 )  PTT:28.8 sec    Img  no new imaging     A/P:   69yo Female s/p L hip ORIF on 3/24/25, doing well    Weightbearing: weight bearing as tolerated   DVT ppx: SCD, chemoppx per 1', recommend 6 total weeks of dvt chemoppx post procedure, can dc on asa 81 mg for 6 weeks as outpatient   Abx: ancef 24 hours to be completed today   Pain control  Incentive spirometer  Home meds  Trend labs  PT/OT/Rehab  Dispo: Pending PT recommendations

## 2025-03-25 NOTE — PROGRESS NOTE ADULT - SUBJECTIVE AND OBJECTIVE BOX
SUBJECTIVE/OVERNIGHT EVENTS  Today is hospital day 1d. This morning patient was seen and examined at bedside, resting comfortably in bed. No acute or major events overnight.      MEDICATIONS  STANDING MEDICATIONS  donepezil 10 milliGRAM(s) Oral at bedtime  enoxaparin Injectable 40 milliGRAM(s) SubCutaneous every 24 hours  escitalopram 5 milliGRAM(s) Oral daily  multivitamin 1 Tablet(s) Oral daily    PRN MEDICATIONS  acetaminophen     Tablet .. 650 milliGRAM(s) Oral every 6 hours PRN  morphine  - Injectable 2 milliGRAM(s) IV Push every 6 hours PRN  oxycodone    5 mG/acetaminophen 325 mG 1 Tablet(s) Oral every 6 hours PRN    VITALS  T(F): 97.4 (03-24-25 @ 23:39), Max: 98.5 (03-24-25 @ 19:56)  HR: 66 (03-24-25 @ 23:39) (63 - 79)  BP: 138/66 (03-24-25 @ 23:39) (94/50 - 138/66)  RR: 18 (03-24-25 @ 23:39) (15 - 20)  SpO2: 98% (03-24-25 @ 23:39) (98% - 100%)    PHYSICAL EXAM      LABS             11.9   10.02 )-----------( 203      ( 03-24-25 @ 04:59 )             37.9     134  |  98  |  17  -------------------------<  140   03-24-25 @ 04:59  4.3  |  25  |  0.8    Ca      9.2     03-24-25 @ 04:59  Mg     1.9     03-24-25 @ 04:59    TPro  6.9  /  Alb  4.2  /  TBili  0.3  /  DBili  x   /  AST  19  /  ALT  15  /  AlkPhos  62  /  GGT  x     03-24-25 @ 04:59    PT/INR - ( 03-23-25 @ 21:29 )   PT: 10.70 sec;   INR: 0.91 ratio  PTT - ( 03-23-25 @ 21:29 )  PTT:28.8 sec      Urinalysis Basic - ( 24 Mar 2025 04:59 )    Color: x / Appearance: x / SG: x / pH: x  Gluc: 140 mg/dL / Ketone: x  / Bili: x / Urobili: x   Blood: x / Protein: x / Nitrite: x   Leuk Esterase: x / RBC: x / WBC x   Sq Epi: x / Non Sq Epi: x / Bacteria: x          IMAGING SUBJECTIVE/OVERNIGHT EVENTS  Today is hospital day 1d. This morning patient was seen and examined at bedside, resting comfortably in bed. No acute or major events overnight.      MEDICATIONS  STANDING MEDICATIONS  donepezil 10 milliGRAM(s) Oral at bedtime  enoxaparin Injectable 40 milliGRAM(s) SubCutaneous every 24 hours  escitalopram 5 milliGRAM(s) Oral daily  multivitamin 1 Tablet(s) Oral daily    PRN MEDICATIONS  acetaminophen     Tablet .. 650 milliGRAM(s) Oral every 6 hours PRN  morphine  - Injectable 2 milliGRAM(s) IV Push every 6 hours PRN  oxycodone    5 mG/acetaminophen 325 mG 1 Tablet(s) Oral every 6 hours PRN    VITALS  T(F): 97.4 (03-24-25 @ 23:39), Max: 98.5 (03-24-25 @ 19:56)  HR: 66 (03-24-25 @ 23:39) (63 - 79)  BP: 138/66 (03-24-25 @ 23:39) (94/50 - 138/66)  RR: 18 (03-24-25 @ 23:39) (15 - 20)  SpO2: 98% (03-24-25 @ 23:39) (98% - 100%)    PHYSICAL EXAM  GENERAL: NAD, lying in bed comfortably  HEAD:  Atraumatic, normocephalic  EYES: EOMI, PERRLA, conjunctiva and sclera clear  ENT: Moist mucous membranes  NECK: Supple  HEART: Regular rate and rhythm, no murmurs, rubs, or gallops  LUNGS: Unlabored respirations.  Clear to auscultation bilaterally, no crackles, wheezing, or rhonchi  ABDOMEN: Soft, nontender, nondistended, +BS  EXTREMITIES: 2+ peripheral pulses bilaterally. No edema. Dressing applied on left upper leg at ORIF site  NERVOUS SYSTEM:  A&Ox2, no focal deficits   SKIN: No rashes     LABS             11.9   10.02 )-----------( 203      ( 03-24-25 @ 04:59 )             37.9     134  |  98  |  17  -------------------------<  140   03-24-25 @ 04:59  4.3  |  25  |  0.8    Ca      9.2     03-24-25 @ 04:59  Mg     1.9     03-24-25 @ 04:59    TPro  6.9  /  Alb  4.2  /  TBili  0.3  /  DBili  x   /  AST  19  /  ALT  15  /  AlkPhos  62  /  GGT  x     03-24-25 @ 04:59    PT/INR - ( 03-23-25 @ 21:29 )   PT: 10.70 sec;   INR: 0.91 ratio  PTT - ( 03-23-25 @ 21:29 )  PTT:28.8 sec      Urinalysis Basic - ( 24 Mar 2025 04:59 )    Color: x / Appearance: x / SG: x / pH: x  Gluc: 140 mg/dL / Ketone: x  / Bili: x / Urobili: x   Blood: x / Protein: x / Nitrite: x   Leuk Esterase: x / RBC: x / WBC x   Sq Epi: x / Non Sq Epi: x / Bacteria: x          IMAGING

## 2025-03-25 NOTE — PHYSICAL THERAPY INITIAL EVALUATION ADULT - GENERAL OBSERVATIONS, REHAB EVAL
[id number] am Chart reviewed. Pt. seen semirecline in bed , in No apparent distress , Initially used  ( [id number]), then daughter in Law ( kris) assisted to communicate towards the end of therapy session. + IV lock , Prima fit device , + gauze dressing left hip area, Pt. alert and oriented X 2, Pt. agreed to activity/therapy.

## 2025-03-25 NOTE — CONSULT NOTE ADULT - ASSESSMENT
IMPRESSION: Rehab of L hip intertrochanteric fx, s/p ORIF / s/p fall / dementia, bilateral cataracts    PRECAUTIONS: [   ] Cardiac  [   ] Respiratory  [   ] Seizures [   ] Contact Isolation  [   ] Droplet Isolation  [   ] Other    Weight Bearing Status: WBAT LLE     RECOMMENDATION:    Out of Bed to Chair     DVT/Decubiti Prophylaxis    REHAB PLAN:     [  x  ] Bedside P/T 3-5 times a week   [  x  ]   Bedside O/T  2-3 times a week             [    ] Speech Therapy               [    ]  No Rehab Therapy Indicated   Conditioning/ROM                                    ADL  Bed Mobility                                               Conditioning/ROM  Transfers                                                     Bed Mobility  Sitting /Standing Balance                         Transfers                                        Gait Training                                               Sitting/Standing Balance  Stair Training [   ]Applicable                    Home equipment Eval                                                                        Splinting  [   ] Only      GOALS:   ADL   [  x  ]   Independent                    Transfers  [  x  ] Independent                          Ambulation  [  x  ] Independent     [   x  ] With device                            [    ]  CG                                                         [    ]  CG                                                                  [    ] CG                            [    ] Min A                                                   [    ] Min A                                                              [    ] Min  A          DISCHARGE PLAN:   [    ]  Good candidate for Intensive Rehabilitation/Hospital based                                             Will tolerate 3hrs Intensive Rehab Daily                                       [     ]  Short Term Rehab in Skilled Nursing Facility                                       [     ]  Home with Outpatient or  services                                         [  x   ]  Possible Candidate for Intensive Hospital based Rehab

## 2025-03-25 NOTE — PHYSICAL THERAPY INITIAL EVALUATION ADULT - LIVES WITH, PROFILE
private house with 4-5 steps to enter; has 1 flight to the bedroom with ful bathroom; lives with her son and his family/children/other relative

## 2025-03-25 NOTE — CONSULT NOTE ADULT - SUBJECTIVE AND OBJECTIVE BOX
ORTHOPAEDIC SURGERY CONSULT NOTE    Reason for Consult: Left intertrochanteric hip fracture     HPI: 70yFemaljim presents with pain in her left hip after a fall down an unknown number of stairs today. History obtained from daughter in law at bedside.   No head trauma or LOC. No pain elsewhere.     Ambulatory status: Unassisted at baseline     PAST MEDICAL & SURGICAL HISTORY:    Allergies: No Known Allergies    Medications:     PHYSICAL EXAM:  Vital Signs Last 24 Hrs  T(C): 36.5 (23 Mar 2025 20:54), Max: 36.5 (23 Mar 2025 20:54)  T(F): 97.7 (23 Mar 2025 20:54), Max: 97.7 (23 Mar 2025 20:54)  HR: 64 (23 Mar 2025 20:54) (64 - 64)  BP: 118/67 (23 Mar 2025 20:54) (118/67 - 118/67)  BP(mean): --  RR: 19 (23 Mar 2025 20:54) (19 - 19)  SpO2: 99% (23 Mar 2025 20:54) (99% - 99%)    Parameters below as of 23 Mar 2025 20:54  Patient On (Oxygen Delivery Method): room air    Physical Exam:  Alert, NAD  Resp: NLB on RA.    PELVIS: No open skin or wounds    BUE:  Skin intact  No deformities or evidence of trauma  Able to raise arms above head without pain  NTTP throughout  NVID    RLE:  Skin intact  NTTP hip, thigh, knee, leg, ankle or foot.   No pain with log-roll or axial compression  Able to actively SLR.  SILT DP/SP/T/Olmedo/Sa.   EHL/FHL/TA/Gs motor intact.  Toes wwp, cap refill < 2 sec    LLE:  No open skin or wounds  Extremity shortened and externally rotated  TTP over the hip  NTTP knee, leg, ankle or foot.   ROM limited by pain  SILT DP/SP/T/Olmedo/Sa.   EHL/FHL/TA/Gs motor intact.  Toes wwp, cap refill < 2 sec  Compartments soft and compressible. No pain on passive stretch.    Labs:                        11.4   7.18  )-----------( 221      ( 23 Mar 2025 21:29 )             36.6     03-23    136  |  100  |  18  ----------------------------<  100[H]  4.8   |  28  |  0.9    Ca    9.2      23 Mar 2025 21:29    TPro  6.6  /  Alb  4.2  /  TBili  0.2  /  DBili  x   /  AST  18  /  ALT  14  /  AlkPhos  59  03-23    PT/INR - ( 23 Mar 2025 21:29 )   PT: 10.70 sec;   INR: 0.91 ratio         PTT - ( 23 Mar 2025 21:29 )  PTT:28.8 sec    Imaging:  XR pelvis:  - Minimally displaced intertrochanteric hip fracture     A/P: 70y Female with left intertrochanteric hip fracture.     Added on for left hip IMN 3/24/25  NPO/IVF MN  Please obtain preop labs: CBC, BMP, PT/INR, PTT, T&S x2, CXR, EKG, COVID test  DVT ppx: patient may receive prophyhlactic LVX or HSQ prior to OR  Medicine clearance note with risk stratification signed by attending    FANNY POWERS  Pain control  PT postoperatively  Call ortho with any questions [phone]
HPI:  69 yo F with pmhx of dementia, bilateral cataracts comes in to the ED after a fall. Son at bedside. He states pt was walking down stairs and slipped on her socks while descending the final 3 steps, fell on her side, did not hit her head, no LOC. (-) palpitations, vertigo, SOB, chest pain.    Vitals: Unremarkable; T97.7, HR 64, /67, SpO2 99 on RA  Labs: Hgb 11.4 (baseline unknown), MCV 73.5, otherwise unremarkable.    Imaging:   CTH negative  CT Cspine negative  CTAP: Acute, impacted left femoral intertrochanteric fracture. Sacral Tarlov cysts.  CT LLE: Acute, impacted left femoral intertrochanteric fracture.Soft tissues unremarkable. No hip dislocation. Degenerative change of left hip and left knee.    S/p fluids and morphine 4 mg IVx2 in ED.    Admitted to medicine for further management      S/p L hip ORIF on 3/24/25, Weightbearing: weight bearing as tolerated as per ortho      PAST MEDICAL & SURGICAL HISTORY:      Hospital Course:    TODAY'S SUBJECTIVE & REVIEW OF SYMPTOMS:     Constitutional WNL   Cardio WNL   Resp WNL   GI WNL  Heme WNL  Endo WNL  Skin WNL  MSK left hip pain   Neuro WNL  Cognitive WNL  Psych WNL      MEDICATIONS  (STANDING):  donepezil 10 milliGRAM(s) Oral at bedtime  enoxaparin Injectable 40 milliGRAM(s) SubCutaneous every 24 hours  escitalopram 5 milliGRAM(s) Oral daily  multivitamin 1 Tablet(s) Oral daily    MEDICATIONS  (PRN):  acetaminophen     Tablet .. 650 milliGRAM(s) Oral every 6 hours PRN Mild Pain (1 - 3)  morphine  - Injectable 2 milliGRAM(s) IV Push every 6 hours PRN Severe Pain (7 - 10)  oxycodone    5 mG/acetaminophen 325 mG 1 Tablet(s) Oral every 6 hours PRN Moderate Pain (4 - 6)      FAMILY HISTORY:      Allergies    No Known Allergies    Intolerances        SOCIAL HISTORY:    [  ] Etoh  [  ] Smoking  [  ] Substance abuse     Home Environment:  [   ] Home Alone  [ x  ] Lives with Family  [   ] Home Health Aid    Dwelling:  [   ] Apartment  [  x ] Private House  [   ] Adult Home  [   ] Skilled Nursing Facility      [   ] Short Term  [   ] Long Term  [  x ] Stairs       Elevator [   ]    FUNCTIONAL STATUS PTA: (Check all that apply)  Ambulation: [ x   ]Independent    [   ] Dependent     [   ] Non-Ambulatory  Assistive Device: [   ] SA Cane  [   ]  Q Cane  [   ] Walker  [   ]  Wheelchair  ADL : [ x  ] Independent  [    ]  Dependent       Vital Signs Last 24 Hrs  T(C): 36.8 (25 Mar 2025 07:00), Max: 36.9 (24 Mar 2025 19:56)  T(F): 98.3 (25 Mar 2025 07:00), Max: 98.5 (24 Mar 2025 19:56)  HR: 69 (25 Mar 2025 07:00) (63 - 73)  BP: 98/57 (25 Mar 2025 07:00) (94/50 - 138/66)  BP(mean): --  RR: 16 (25 Mar 2025 07:00) (15 - 20)  SpO2: 96% (25 Mar 2025 07:00) (96% - 100%)    Parameters below as of 25 Mar 2025 07:00  Patient On (Oxygen Delivery Method): room air          PHYSICAL EXAM: Awake & Alert  GENERAL: NAD  HEAD:  Normocephalic  CHEST/LUNG: Clear   HEART: S1S2+  ABDOMEN: Soft, Nontender  EXTREMITIES:  no calf tenderness    NERVOUS SYSTEM:  Cranial Nerves 2-12 intact [   ] Abnormal  [   ]  ROM: WFL all extremities [   ]  Abnormal [ x  ]limited LLE   Motor Strength: WFL all extremities  [   ]  Abnormal [ x  ]limited LLE   Sensation: intact to light touch [  x ] Abnormal [   ]    FUNCTIONAL STATUS:  Bed Mobility: Independent [   ]  Supervision [   ]  Needs Assistance [  x ]  N/A [   ]  Transfers: Independent [   ]  Supervision [   ]  Needs Assistance [   ]  N/A [   ]   Ambulation: Independent [   ]  Supervision [   ]  Needs Assistance [   ]  N/A [   ]  ADL: Independent [   ] Requires Assistance [   ] N/A [   ]      LABS:                        9.4    7.39  )-----------( 169      ( 25 Mar 2025 07:10 )             29.7     03-25    134[L]  |  97[L]  |  12  ----------------------------<  117[H]  4.7   |  29  |  0.6[L]    Ca    8.6      25 Mar 2025 07:10  Mg     1.9     03-25    TPro  5.7[L]  /  Alb  3.5  /  TBili  0.3  /  DBili  x   /  AST  17  /  ALT  12  /  AlkPhos  50  03-25    PT/INR - ( 23 Mar 2025 21:29 )   PT: 10.70 sec;   INR: 0.91 ratio         PTT - ( 23 Mar 2025 21:29 )  PTT:28.8 sec  Urinalysis Basic - ( 25 Mar 2025 07:10 )    Color: x / Appearance: x / SG: x / pH: x  Gluc: 117 mg/dL / Ketone: x  / Bili: x / Urobili: x   Blood: x / Protein: x / Nitrite: x   Leuk Esterase: x / RBC: x / WBC x   Sq Epi: x / Non Sq Epi: x / Bacteria: x        RADIOLOGY & ADDITIONAL STUDIES:

## 2025-03-25 NOTE — PHYSICAL THERAPY INITIAL EVALUATION ADULT - STRENGTHENING, PT EVAL
Increase muscle strength of left  LE to 4/5 by d/c.  Stairs: complete 1 flight with min assist by d/c

## 2025-03-25 NOTE — PROGRESS NOTE ADULT - SUBJECTIVE AND OBJECTIVE BOX
LIZET WYNNE  70y  [facility] 4B 021 B      Patient is a 70y old  Female who presents with a chief complaint of Fall (25 Mar 2025 06:58)      My note supersedes the resident's note      INTERVAL HPI/OVERNIGHT EVENTS:      no acute events overnight   REVIEW OF SYSTEMS:  CONSTITUTIONAL: No fever, weight loss, or fatigue  EYES: No eye pain, visual disturbances, or discharge  ENMT:  No difficulty hearing, tinnitus, vertigo; No sinus or throat pain  NECK: No pain or stiffness  BREASTS: No pain, masses, or nipple discharge  RESPIRATORY: No cough, wheezing, chills or hemoptysis; No shortness of breath  CARDIOVASCULAR: No chest pain, palpitations, dizziness, or leg swelling  GASTROINTESTINAL: No abdominal or epigastric pain. No nausea, vomiting, or hematemesis; No diarrhea or constipation. No melena or hematochezia.  GENITOURINARY: No dysuria, frequency, hematuria, or incontinence  NEUROLOGICAL: No headaches, memory loss, loss of strength, numbness, or tremors  SKIN: No itching, burning, rashes, or lesions   LYMPH NODES: No enlarged glands  ENDOCRINE: No heat or cold intolerance; No hair loss  MUSCULOSKELETAL: No joint pain or swelling; No muscle, back, or extremity pain  PSYCHIATRIC: No depression, anxiety, mood swings, or difficulty sleeping  HEME/LYMPH: No easy bruising, or bleeding gums  ALLERY AND IMMUNOLOGIC: No hives or eczema  FAMILY HISTORY:    T(C): 36.8 (03-25-25 @ 07:00), Max: 36.9 (03-24-25 @ 19:56)  HR: 69 (03-25-25 @ 07:00) (63 - 73)  BP: 98/57 (03-25-25 @ 07:00) (94/50 - 138/66)  RR: 16 (03-25-25 @ 07:00) (15 - 20)  SpO2: 96% (03-25-25 @ 07:00) (96% - 100%)  Wt(kg): --Vital Signs Last 24 Hrs  T(C): 36.8 (25 Mar 2025 07:00), Max: 36.9 (24 Mar 2025 19:56)  T(F): 98.3 (25 Mar 2025 07:00), Max: 98.5 (24 Mar 2025 19:56)  HR: 69 (25 Mar 2025 07:00) (63 - 73)  BP: 98/57 (25 Mar 2025 07:00) (94/50 - 138/66)  BP(mean): --  RR: 16 (25 Mar 2025 07:00) (15 - 20)  SpO2: 96% (25 Mar 2025 07:00) (96% - 100%)    Parameters below as of 25 Mar 2025 07:00  Patient On (Oxygen Delivery Method): room air        PHYSICAL EXAM:  GENERAL: NAD,   HEAD:  Atraumatic, Normocephalic  EYES: EOMI, PERRLA, conjunctiva and sclera clear  ENMT: No tonsillar erythema, exudates, or enlargement; Moist mucous membranes, Good dentition, No lesions  NECK: Supple, No JVD, Normal thyroid  NERVOUS SYSTEM:  Alert & Oriented X3, Good concentration; Motor Strength 5/5 B/L upper and lower extremities; DTRs 2+ intact and symmetric  PULM: Clear to auscultation bilaterally  CARDIAC: Regular rate and rhythm; No murmurs, rubs, or gallops  GI: Soft, Nontender, Nondistended; Bowel sounds present  EXTREMITIES:  L hip site clean   LYMPH: No lymphadenopathy noted  SKIN: No rashes or lesions    Consultant(s) Notes Reviewed:  [x ] YES  [ ] NO  Care Discussed with Consultants/Other Providers [ x] YES  [ ] NO    LABS:                            9.4    7.39  )-----------( 169      ( 25 Mar 2025 07:10 )             29.7   03-25    134[L]  |  97[L]  |  12  ----------------------------<  117[H]  4.7   |  29  |  0.6[L]    Ca    8.6      25 Mar 2025 07:10  Mg     1.9     03-25    TPro  5.7[L]  /  Alb  3.5  /  TBili  0.3  /  DBili  x   /  AST  17  /  ALT  12  /  AlkPhos  50  03-25            acetaminophen     Tablet .. 650 milliGRAM(s) Oral every 6 hours PRN  donepezil 10 milliGRAM(s) Oral at bedtime  enoxaparin Injectable 40 milliGRAM(s) SubCutaneous every 24 hours  escitalopram 5 milliGRAM(s) Oral daily  morphine  - Injectable 2 milliGRAM(s) IV Push every 6 hours PRN  multivitamin 1 Tablet(s) Oral daily  oxycodone    5 mG/acetaminophen 325 mG 1 Tablet(s) Oral every 6 hours PRN      HEALTH ISSUES - PROBLEM Dx:          Case Discussed with House Staff   50 minutes spent on total time on encounter , this excludes teaching   Spectra [phone]

## 2025-03-25 NOTE — OCCUPATIONAL THERAPY INITIAL EVALUATION ADULT - SPECIFY REASON(S)
Chart reviewed. Attempted bedside OT assessment. Pt with family at bedside, pt with 9-10/10 pain to LE would like pain medication prior to assessment. RN Martha present and aware. OT to f/u at later

## 2025-03-26 LAB
ANION GAP SERPL CALC-SCNC: 10 MMOL/L — SIGNIFICANT CHANGE UP (ref 7–14)
BASOPHILS # BLD AUTO: 0.03 K/UL — SIGNIFICANT CHANGE UP (ref 0–0.2)
BASOPHILS NFR BLD AUTO: 0.4 % — SIGNIFICANT CHANGE UP (ref 0–1)
BUN SERPL-MCNC: 12 MG/DL — SIGNIFICANT CHANGE UP (ref 10–20)
CALCIUM SERPL-MCNC: 8.5 MG/DL — SIGNIFICANT CHANGE UP (ref 8.4–10.5)
CHLORIDE SERPL-SCNC: 98 MMOL/L — SIGNIFICANT CHANGE UP (ref 98–110)
CO2 SERPL-SCNC: 26 MMOL/L — SIGNIFICANT CHANGE UP (ref 17–32)
CREAT SERPL-MCNC: 0.6 MG/DL — LOW (ref 0.7–1.5)
EGFR: 96 ML/MIN/1.73M2 — SIGNIFICANT CHANGE UP
EGFR: 96 ML/MIN/1.73M2 — SIGNIFICANT CHANGE UP
EOSINOPHIL # BLD AUTO: 0.07 K/UL — SIGNIFICANT CHANGE UP (ref 0–0.7)
EOSINOPHIL NFR BLD AUTO: 0.9 % — SIGNIFICANT CHANGE UP (ref 0–8)
GLUCOSE SERPL-MCNC: 124 MG/DL — HIGH (ref 70–99)
HCT VFR BLD CALC: 27.6 % — LOW (ref 37–47)
HGB BLD-MCNC: 8.8 G/DL — LOW (ref 12–16)
IMM GRANULOCYTES NFR BLD AUTO: 0.4 % — HIGH (ref 0.1–0.3)
LYMPHOCYTES # BLD AUTO: 1.53 K/UL — SIGNIFICANT CHANGE UP (ref 1.2–3.4)
LYMPHOCYTES # BLD AUTO: 20 % — LOW (ref 20.5–51.1)
MAGNESIUM SERPL-MCNC: 2.1 MG/DL — SIGNIFICANT CHANGE UP (ref 1.8–2.4)
MCHC RBC-ENTMCNC: 22.9 PG — LOW (ref 27–31)
MCHC RBC-ENTMCNC: 31.9 G/DL — LOW (ref 32–37)
MCV RBC AUTO: 71.7 FL — LOW (ref 81–99)
MONOCYTES # BLD AUTO: 0.72 K/UL — HIGH (ref 0.1–0.6)
MONOCYTES NFR BLD AUTO: 9.4 % — HIGH (ref 1.7–9.3)
NEUTROPHILS # BLD AUTO: 5.27 K/UL — SIGNIFICANT CHANGE UP (ref 1.4–6.5)
NEUTROPHILS NFR BLD AUTO: 68.9 % — SIGNIFICANT CHANGE UP (ref 42.2–75.2)
NRBC BLD AUTO-RTO: 0 /100 WBCS — SIGNIFICANT CHANGE UP (ref 0–0)
PLATELET # BLD AUTO: 177 K/UL — SIGNIFICANT CHANGE UP (ref 130–400)
PMV BLD: 11 FL — HIGH (ref 7.4–10.4)
POTASSIUM SERPL-MCNC: 4.8 MMOL/L — SIGNIFICANT CHANGE UP (ref 3.5–5)
POTASSIUM SERPL-SCNC: 4.8 MMOL/L — SIGNIFICANT CHANGE UP (ref 3.5–5)
RBC # BLD: 3.85 M/UL — LOW (ref 4.2–5.4)
RBC # FLD: 15.4 % — HIGH (ref 11.5–14.5)
SODIUM SERPL-SCNC: 134 MMOL/L — LOW (ref 135–146)
WBC # BLD: 7.65 K/UL — SIGNIFICANT CHANGE UP (ref 4.8–10.8)
WBC # FLD AUTO: 7.65 K/UL — SIGNIFICANT CHANGE UP (ref 4.8–10.8)

## 2025-03-26 PROCEDURE — 99233 SBSQ HOSP IP/OBS HIGH 50: CPT

## 2025-03-26 RX ORDER — POLYETHYLENE GLYCOL 3350 17 G/17G
17 POWDER, FOR SOLUTION ORAL DAILY
Refills: 0 | Status: DISCONTINUED | OUTPATIENT
Start: 2025-03-26 | End: 2025-03-26

## 2025-03-26 RX ORDER — SENNA 187 MG
2 TABLET ORAL AT BEDTIME
Refills: 0 | Status: DISCONTINUED | OUTPATIENT
Start: 2025-03-26 | End: 2025-03-28

## 2025-03-26 RX ORDER — POLYETHYLENE GLYCOL 3350 17 G/17G
17 POWDER, FOR SOLUTION ORAL
Refills: 0 | Status: DISCONTINUED | OUTPATIENT
Start: 2025-03-26 | End: 2025-03-28

## 2025-03-26 RX ADMIN — Medication 2 TABLET(S): at 21:06

## 2025-03-26 RX ADMIN — Medication 1 TABLET(S): at 11:22

## 2025-03-26 RX ADMIN — ENOXAPARIN SODIUM 40 MILLIGRAM(S): 100 INJECTION SUBCUTANEOUS at 17:33

## 2025-03-26 RX ADMIN — ESCITALOPRAM OXALATE 5 MILLIGRAM(S): 20 TABLET ORAL at 11:22

## 2025-03-26 RX ADMIN — POLYETHYLENE GLYCOL 3350 17 GRAM(S): 17 POWDER, FOR SOLUTION ORAL at 17:33

## 2025-03-26 RX ADMIN — DONEPEZIL HYDROCHLORIDE 10 MILLIGRAM(S): 5 TABLET ORAL at 21:06

## 2025-03-26 NOTE — OCCUPATIONAL THERAPY INITIAL EVALUATION ADULT - TRANSFER TRAINING, PT EVAL
Pt will perform sit<>stand, bed<>chair and toilet transfers with Min A using most appropriate AD by discharge

## 2025-03-26 NOTE — PROGRESS NOTE ADULT - ASSESSMENT
69 yo F with pmhx of dementia, bilateral cataracts presenting for Left intertrochanteric hip fracture s/p mechanical fall     #Left intertrochanteric hip fracture  #Mechanical fall  -XR pelvis: Minimally displaced intertrochanteric hip fracture  -CT AP and LLE: Acute, impacted left femoral intertrochanteric fracture.  -Pt ambulates without assistance at baseline   -s/p morphine 4mg IV x2  -Tylenol 650 mg PO Q6H PRN for mild pain  -Tramadol 50 mg PO Q6H PRN for moderate pain  -Oxycodone 5 mg PO Q6H PRN for severe pain  -s/p ORIF  -Pain control   -WBAT  -Incentive spirometery   - HD stable  -PT/OT/Physiatry - possible inpatient rehab  - Started on bowel regiment, miralax q12h and senna at bedtime    #Microcytic anemia  -Hgb 11.1 (no baseline), MCV 73.5  -FHx of anemia  -Iron studies WNL  -Keep Hgb >7, active type and screen    #Dementia  -Memory decline  -A&Ox3 at baseline and on my assessment at bedside  Plan  -C/w donepezil     DVT PPX: Lovenox, on hold  GI PPX: Protonix  DIET: NPO for now  ACTIVITY: NWB of LLE  DISPOSITION: Possible in patient physiatry

## 2025-03-26 NOTE — OCCUPATIONAL THERAPY INITIAL EVALUATION ADULT - PATIENT PROFILE REVIEW, REHAB EVAL
yes
Time Seen 13:42-1412pm pt chart thoroughly reviewed prior to OT evaluation  Pt offered and declined , daughter in law at bedside to translate/yes

## 2025-03-26 NOTE — OCCUPATIONAL THERAPY INITIAL EVALUATION ADULT - ADDITIONAL COMMENTS
Pt resides in private multilevel home with son and his family, + stairs, + tub, no AD or DME required

## 2025-03-26 NOTE — OCCUPATIONAL THERAPY INITIAL EVALUATION ADULT - STANDING BALANCE: DYNAMIC, REHAB EVAL
Pt alert and oriented x4, c/o fever and back pain since yesterday, states has been taking tylenol but found out that the tylenol . Denies pain at this time, denies sob, chest pain, dizziness, dysuria, abdominal pain, N/V/D. PMH Hypothyroidism. w/ RW/fair balance

## 2025-03-26 NOTE — PROGRESS NOTE ADULT - SUBJECTIVE AND OBJECTIVE BOX
LIZET WYNNE 70y Female  MRN#: [id number]     Hospital Day: 2d    Pt is currently admitted with the primary diagnosis of  Fracture of unspecified part of neck of left femur, initial encounter for closed fracture        SUBJECTIVE     Subjective complaints  Pt was evaluated this am. Patient has no complaints    Review of systems  ROS is negative                                            ----------------------------------------------------------    HOSPITAL COURSE                                             ----------------------------------------------------------  OBJECTIVE  PAST MEDICAL & SURGICAL HISTORY                                            -----------------------------------------------------------  ALLERGIES:  No Known Allergies                                            ------------------------------------------------------------    HOME MEDICATIONS  Home Medications:  donepezil 10 mg oral tablet: 1 tab(s) orally once a day (24 Mar 2025 02:56)  escitalopram 5 mg oral tablet: 1 tab(s) orally once a day (24 Mar 2025 02:56)  multivitamin: 1 cap(s) orally once a day (24 Mar 2025 02:57)                           MEDICATIONS:  STANDING MEDICATIONS  donepezil 10 milliGRAM(s) Oral at bedtime  enoxaparin Injectable 40 milliGRAM(s) SubCutaneous every 24 hours  escitalopram 5 milliGRAM(s) Oral daily  multivitamin 1 Tablet(s) Oral daily  polyethylene glycol 3350 17 Gram(s) Oral two times a day  senna 2 Tablet(s) Oral at bedtime    PRN MEDICATIONS  acetaminophen     Tablet .. 650 milliGRAM(s) Oral every 6 hours PRN  morphine  - Injectable 2 milliGRAM(s) IV Push every 6 hours PRN  oxycodone    5 mG/acetaminophen 325 mG 1 Tablet(s) Oral every 6 hours PRN                                            ------------------------------------------------------------  VITAL SIGNS: Last 24 Hours  T(C): 36.8 (26 Mar 2025 07:06), Max: 36.9 (25 Mar 2025 16:10)  T(F): 98.2 (26 Mar 2025 07:06), Max: 98.4 (25 Mar 2025 16:10)  HR: 79 (26 Mar 2025 07:06) (72 - 88)  BP: 104/63 (26 Mar 2025 07:06) (103/61 - 123/73)  BP(mean): 90 (25 Mar 2025 23:30) (90 - 90)  RR: 19 (26 Mar 2025 07:06) (17 - 19)  SpO2: 96% (26 Mar 2025 07:06) (96% - 96%)      03-25-25 @ 07:01  -  03-26-25 @ 07:00  --------------------------------------------------------  IN: 0 mL / OUT: 700 mL / NET: -700 mL    03-26-25 @ 07:01  -  03-26-25 @ 13:32  --------------------------------------------------------  IN: 0 mL / OUT: 500 mL / NET: -500 mL                                             --------------------------------------------------------------  LABS:                        8.8    7.65  )-----------( 177      ( 26 Mar 2025 06:12 )             27.6     03-26    134[L]  |  98  |  12  ----------------------------<  124[H]  4.8   |  26  |  0.6[L]    Ca    8.5      26 Mar 2025 06:12  Mg     2.1     03-26    TPro  5.7[L]  /  Alb  3.5  /  TBili  0.3  /  DBili  x   /  AST  17  /  ALT  12  /  AlkPhos  50  03-25      Urinalysis Basic - ( 26 Mar 2025 06:12 )    Color: x / Appearance: x / SG: x / pH: x  Gluc: 124 mg/dL / Ketone: x  / Bili: x / Urobili: x   Blood: x / Protein: x / Nitrite: x   Leuk Esterase: x / RBC: x / WBC x   Sq Epi: x / Non Sq Epi: x / Bacteria: x                                                            -------------------------------------------------------------  RADIOLOGY, TELEMETRY:                                            --------------------------------------------------------------    PHYSICAL EXAM:  GENERAL: NAD, lying in bed comfortably  HEAD:  Atraumatic, Normocephalic  CHEST/LUNG: Clear to auscultation bilaterally; No rales, rhonchi, wheezing, or rubs. Unlabored respirations  HEART: regular rate and rhythm; No murmurs, rubs, or gallops  ABDOMEN: Bowel sounds present; Soft, Nontender, Nondistended.    EXTREMITIES: Warm. No clubbing, cyanosis, or edema  NERVOUS SYSTEM:  Alert & Oriented X3. No focal deficits                                            --------------------------------------------------------------

## 2025-03-26 NOTE — OCCUPATIONAL THERAPY INITIAL EVALUATION ADULT - UPPER BODY DRESSING, PREVIOUS LEVEL OF FUNCTION, OT EVAL
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 Presented here 11/2/23 for progressive fatigue, nausea & vomiting. Found to have suspected septic shock secondary to purulent left lower extremity soft tissue infection (11/2 s/p bedside I&D), acute hypoxemic respiratory failure requiring intubation (11/5-11/6), oliguric CRISTAL requiring renal replacement therapy, & large posteriorly located loculated pericardial effusion with suspected tamponade physiology requiring pericardial window (11/5)   CT Surgery following for pericardial effusion s/p pericardial window 11/5. Chest tubes in place.  Cardiology following for pericardial effusion.   Nephrology following CRISTAL with anuria, started hemodialysis/CRRT/stopped and monitoring.   Surgery following for left leg abscess, wound care and IV antibiotics.        Will need therapy eval and recs when appropriate.        Social History:  Patient from home with spouse. Independent at baseline, no Services, no DME. Spouse is primary family contact. Transportation at discharge TBD.         11/6/23:  Not medically ready for initiating discharge planning.        Mayte Chang RN       independent

## 2025-03-26 NOTE — OCCUPATIONAL THERAPY INITIAL EVALUATION ADULT - GENERAL OBSERVATIONS, REHAB EVAL
Pt encountered reclined in bed, + IV locked, + intact dressing to LLE thigh, + daughter in law at bedside. Pt agreeable to bedside OT assessment, may be seen as confirmed with RN. Pt returned to bed as found, + IV locked, intact dressing to L thigh, + daughter in law at bedside

## 2025-03-26 NOTE — PROGRESS NOTE ADULT - SUBJECTIVE AND OBJECTIVE BOX
LIZET WYNNE  70y  Female      Patient is a 70y old  Female who presents with a chief complaint of Fall (26 Mar 2025 07:35)      INTERVAL HPI/OVERNIGHT EVENTS:  Patient seen and examined earlier this morning  lying in bed with her daughter bedside  in nad  states she is feeling well    T(C): 36.8 (03-26-25 @ 07:06), Max: 36.9 (03-25-25 @ 16:10)  HR: 79 (03-26-25 @ 07:06) (72 - 88)  BP: 104/63 (03-26-25 @ 07:06) (103/61 - 123/73)  RR: 19 (03-26-25 @ 07:06) (17 - 19)  SpO2: 96% (03-26-25 @ 07:06) (96% - 96%)    PHYSICAL EXAM:  GENERAL: NAD, well-groomed,   HEAD:  Atraumatic, Normocephalic  EYES: conjunctiva and sclera clear  ENMT: Moist mucous membranes,  No visible lesions  NECK: Supple, No JVD, Normal thyroid  NERVOUS SYSTEM:  Alert & Oriented X3, Good concentration; moves all extremities   CHEST/LUNG: good air entry   HEART: Regular rate and rhythm; No murmurs, rubs, or gallops  ABDOMEN: Soft, Nontender, Nondistended; Bowel sounds present  EXTREMITIES:  2+ Peripheral Pulses, No clubbing, cyanosis, or edema, dressing to left hip  LYMPH: No lymphadenopathy noted  SKIN: No rashes or lesions    Consultant(s) Notes Reviewed:  [x ] YES  [ ] NO  Care Discussed with Consultants/Other Providers [ x] YES  [ ] NO    LAB:                        8.8    7.65  )-----------( 177      ( 26 Mar 2025 06:12 )             27.6     03-26    134[L]  |  98  |  12  ----------------------------<  124[H]  4.8   |  26  |  0.6[L]    Ca    8.5      26 Mar 2025 06:12  Mg     2.1     03-26    TPro  5.7[L]  /  Alb  3.5  /  TBili  0.3  /  DBili  x   /  AST  17  /  ALT  12  /  AlkPhos  50  03-25    LIVER FUNCTIONS - ( 25 Mar 2025 07:10 )  Alb: 3.5 g/dL / Pro: 5.7 g/dL / ALK PHOS: 50 U/L / ALT: 12 U/L / AST: 17 U/L / GGT: x             Drug Dosing Weight  Height (cm): 170.2 (02 Jun 2024 21:19)  Weight (kg): 78 (24 Mar 2025 10:59)  BMI (kg/m2): 26.9 (24 Mar 2025 10:59)  BSA (m2): 1.9 (24 Mar 2025 10:59)      I&O's Summary    25 Mar 2025 07:01  -  26 Mar 2025 07:00  --------------------------------------------------------  IN: 0 mL / OUT: 700 mL / NET: -700 mL      Urinalysis Basic - ( 26 Mar 2025 06:12 )    Color: x / Appearance: x / SG: x / pH: x  Gluc: 124 mg/dL / Ketone: x  / Bili: x / Urobili: x   Blood: x / Protein: x / Nitrite: x   Leuk Esterase: x / RBC: x / WBC x   Sq Epi: x / Non Sq Epi: x / Bacteria: x        RADIOLOGY & ADDITIONAL TESTS:  Imaging Personally Reviewed:  [x] YES  [ ] NO      MEDS:  acetaminophen     Tablet .. 650 milliGRAM(s) Oral every 6 hours PRN  donepezil 10 milliGRAM(s) Oral at bedtime  enoxaparin Injectable 40 milliGRAM(s) SubCutaneous every 24 hours  escitalopram 5 milliGRAM(s) Oral daily  morphine  - Injectable 2 milliGRAM(s) IV Push every 6 hours PRN  multivitamin 1 Tablet(s) Oral daily  oxycodone    5 mG/acetaminophen 325 mG 1 Tablet(s) Oral every 6 hours PRN  polyethylene glycol 3350 17 Gram(s) Oral two times a day  senna 2 Tablet(s) Oral at bedtime

## 2025-03-26 NOTE — PROGRESS NOTE ADULT - ASSESSMENT
71 yo F with pmhx of dementia, bilateral cataracts presenting for Left intertrochanteric hip fracture s/p mechanical fall     #Left intertrochanteric hip fracture secondary to Mechanical fall  -s/p ORIF on 3/24  - pain well controlled controlled with morphine and oxycodone prn   - pt and physiatry evals appreciated - patient being considered for [facility] vs SNF  - started bowel regimen today  - dvt prophylaxis with lovenox      #constipation  - start miralax q12 and senna at night    #Acute on chronic blood loss anemia  -hemoglobin stable today  -expected drop post op  -repeat cbc in am     #Dementia  -C/w donepezil   -frequent reorientation  -keep by by window  - fall precautions  - aspiration precautions   - daughter in law is bedside for most of the day     #Hyponatremia  - stable; outpatient monitoring     Progress Note Handoff  Pending Consults: CM follow up for dc planning  Pending Tests: cbc  Pending Results: cbc  Family Discussion: Discussed labs, meds, PT follow up and dc planning to [facility] vs. SNF with pt, her daughter in law and medical staff. All questions answered. PFD for 48hrs. Patient will need auth for SNF  Disposition: Home_____/SNF__x____/Other_____/Unknown at this time_____  Spent 55 min reviewing chart, speaking with patient/family and on coordinating patient care during interdisciplinary rounds     Please call me with any questions at extension [phone]

## 2025-03-26 NOTE — PROGRESS NOTE ADULT - SUBJECTIVE AND OBJECTIVE BOX
Orthopaedic Surgery Progress Note    69yo Female s/p L hip ORIF on 3/24/25, pod 2     MEDICATIONS  (STANDING):  donepezil 10 milliGRAM(s) Oral at bedtime  enoxaparin Injectable 40 milliGRAM(s) SubCutaneous every 24 hours  escitalopram 5 milliGRAM(s) Oral daily  multivitamin 1 Tablet(s) Oral daily    MEDICATIONS  (PRN):  acetaminophen     Tablet .. 650 milliGRAM(s) Oral every 6 hours PRN Mild Pain (1 - 3)  morphine  - Injectable 2 milliGRAM(s) IV Push every 6 hours PRN Severe Pain (7 - 10)  oxycodone    5 mG/acetaminophen 325 mG 1 Tablet(s) Oral every 6 hours PRN Moderate Pain (4 - 6)    S&E at bedside this AM. Pain well controlled        P/E:  Alert and oriented to baseline NAD  Nonlabored breathing    Vital Signs Last 24 Hrs  T(C): 36.7 (03-25-25 @ 23:30), Max: 36.9 (03-25-25 @ 16:10)  T(F): 98.1 (03-25-25 @ 23:30), Max: 98.4 (03-25-25 @ 16:10)  HR: 88 (03-25-25 @ 23:30) (72 - 88)  BP: 123/73 (03-25-25 @ 23:30) (103/61 - 123/73)  BP(mean): 90 (03-25-25 @ 23:30) (90 - 90)  RR: 19 (03-25-25 @ 23:30) (17 - 19)  SpO2: 96% (03-25-25 @ 23:30) (96% - 96%)          LLE  Dressings in place, with serosanguinous staining  SILT sp/dp/t/sural/saph  Firing ta/ehl/fhl/gs  Foot WWP, cap refill <2s     Labs                                   9.4    7.39  )-----------( 169      ( 25 Mar 2025 07:10 )             29.7     03-25    134[L]  |  97[L]  |  12  ----------------------------<  117[H]  4.7   |  29  |  0.6[L]    Ca    8.6      25 Mar 2025 07:10  Mg     1.9     03-25    TPro  5.7[L]  /  Alb  3.5  /  TBili  0.3  /  DBili  x   /  AST  17  /  ALT  12  /  AlkPhos  50  03-25         PTT - ( 23 Mar 2025 21:29 )  PTT:28.8 sec        A/P:   69yo Female s/p L hip ORIF on 3/24/25, pod 2, doing well    Weightbearing: weight bearing as tolerated   DVT ppx: SCD, chemoppx per 1', recommend 6 total weeks of dvt chemoppx post procedure, can dc on asa 81 mg for 6 weeks as outpatient   postop abx completed  Pain control  Incentive spirometer  Home meds  Trend labs  PT/OT/Rehab  f/u am labs

## 2025-03-27 LAB
BASOPHILS # BLD AUTO: 0.07 K/UL — SIGNIFICANT CHANGE UP (ref 0–0.2)
BASOPHILS NFR BLD AUTO: 1 % — SIGNIFICANT CHANGE UP (ref 0–1)
BLD GP AB SCN SERPL QL: SIGNIFICANT CHANGE UP
EOSINOPHIL # BLD AUTO: 0.26 K/UL — SIGNIFICANT CHANGE UP (ref 0–0.7)
EOSINOPHIL NFR BLD AUTO: 3.6 % — SIGNIFICANT CHANGE UP (ref 0–8)
HCT VFR BLD CALC: 25.1 % — LOW (ref 37–47)
HCT VFR BLD CALC: 26 % — LOW (ref 37–47)
HGB BLD-MCNC: 8 G/DL — LOW (ref 12–16)
HGB BLD-MCNC: 8.4 G/DL — LOW (ref 12–16)
IMM GRANULOCYTES NFR BLD AUTO: 0.4 % — HIGH (ref 0.1–0.3)
LYMPHOCYTES # BLD AUTO: 1.98 K/UL — SIGNIFICANT CHANGE UP (ref 1.2–3.4)
LYMPHOCYTES # BLD AUTO: 27.4 % — SIGNIFICANT CHANGE UP (ref 20.5–51.1)
MCHC RBC-ENTMCNC: 22.8 PG — LOW (ref 27–31)
MCHC RBC-ENTMCNC: 23 PG — LOW (ref 27–31)
MCHC RBC-ENTMCNC: 31.9 G/DL — LOW (ref 32–37)
MCHC RBC-ENTMCNC: 32.3 G/DL — SIGNIFICANT CHANGE UP (ref 32–37)
MCV RBC AUTO: 71 FL — LOW (ref 81–99)
MCV RBC AUTO: 71.5 FL — LOW (ref 81–99)
MONOCYTES # BLD AUTO: 0.84 K/UL — HIGH (ref 0.1–0.6)
MONOCYTES NFR BLD AUTO: 11.6 % — HIGH (ref 1.7–9.3)
NEUTROPHILS # BLD AUTO: 4.04 K/UL — SIGNIFICANT CHANGE UP (ref 1.4–6.5)
NEUTROPHILS NFR BLD AUTO: 56 % — SIGNIFICANT CHANGE UP (ref 42.2–75.2)
NRBC BLD AUTO-RTO: 0 /100 WBCS — SIGNIFICANT CHANGE UP (ref 0–0)
NRBC BLD AUTO-RTO: 0 /100 WBCS — SIGNIFICANT CHANGE UP (ref 0–0)
PLATELET # BLD AUTO: 192 K/UL — SIGNIFICANT CHANGE UP (ref 130–400)
PLATELET # BLD AUTO: 200 K/UL — SIGNIFICANT CHANGE UP (ref 130–400)
PMV BLD: 11 FL — HIGH (ref 7.4–10.4)
PMV BLD: 11.5 FL — HIGH (ref 7.4–10.4)
RBC # BLD: 3.51 M/UL — LOW (ref 4.2–5.4)
RBC # BLD: 3.66 M/UL — LOW (ref 4.2–5.4)
RBC # FLD: 15.5 % — HIGH (ref 11.5–14.5)
RBC # FLD: 15.6 % — HIGH (ref 11.5–14.5)
WBC # BLD: 7.22 K/UL — SIGNIFICANT CHANGE UP (ref 4.8–10.8)
WBC # BLD: 7.41 K/UL — SIGNIFICANT CHANGE UP (ref 4.8–10.8)
WBC # FLD AUTO: 7.22 K/UL — SIGNIFICANT CHANGE UP (ref 4.8–10.8)
WBC # FLD AUTO: 7.41 K/UL — SIGNIFICANT CHANGE UP (ref 4.8–10.8)

## 2025-03-27 PROCEDURE — 99233 SBSQ HOSP IP/OBS HIGH 50: CPT

## 2025-03-27 RX ORDER — SODIUM CHLORIDE 9 G/1000ML
1000 INJECTION, SOLUTION INTRAVENOUS ONCE
Refills: 0 | Status: COMPLETED | OUTPATIENT
Start: 2025-03-27 | End: 2025-03-27

## 2025-03-27 RX ORDER — SODIUM CHLORIDE 9 G/1000ML
1000 INJECTION, SOLUTION INTRAVENOUS
Refills: 0 | Status: COMPLETED | OUTPATIENT
Start: 2025-03-27 | End: 2025-03-27

## 2025-03-27 RX ADMIN — ENOXAPARIN SODIUM 40 MILLIGRAM(S): 100 INJECTION SUBCUTANEOUS at 17:13

## 2025-03-27 RX ADMIN — DONEPEZIL HYDROCHLORIDE 10 MILLIGRAM(S): 5 TABLET ORAL at 21:27

## 2025-03-27 RX ADMIN — Medication 2 MILLIGRAM(S): at 09:46

## 2025-03-27 RX ADMIN — SODIUM CHLORIDE 100 MILLILITER(S): 9 INJECTION, SOLUTION INTRAVENOUS at 14:03

## 2025-03-27 RX ADMIN — Medication 1 TABLET(S): at 14:04

## 2025-03-27 RX ADMIN — SODIUM CHLORIDE 1000 MILLILITER(S): 9 INJECTION, SOLUTION INTRAVENOUS at 11:30

## 2025-03-27 RX ADMIN — POLYETHYLENE GLYCOL 3350 17 GRAM(S): 17 POWDER, FOR SOLUTION ORAL at 17:14

## 2025-03-27 RX ADMIN — Medication 2 TABLET(S): at 21:27

## 2025-03-27 RX ADMIN — ESCITALOPRAM OXALATE 5 MILLIGRAM(S): 20 TABLET ORAL at 14:04

## 2025-03-27 NOTE — PROGRESS NOTE ADULT - ASSESSMENT
71 yo F with pmhx of dementia, bilateral cataracts presenting for Left intertrochanteric hip fracture s/p mechanical fall     #Left intertrochanteric hip fracture secondary to Mechanical fall  -s/p ORIF on 3/24  - pain well controlled controlled with morphine and oxycodone prn   - pt and physiatry evals appreciated - patient being considered for 4A tomorrow  - started bowel regimen on 3/26  - dvt prophylaxis with lovenox      #constipation  - started miralax q12 and senna at night  - monitor BM's    #Acute on chronic blood loss anemia  #hypotension while working with PT today   -hemoglobin ordered for 4pm today  -expected drop post op- plan is to transfuse if less than 7.5  -check left thigh ct if hgb drops to 7.5  -repeat cbc in am ordered as well  - given bolus of fluids  - started on maintenance fluids  - ordered compression stockings  - PT will re-evaluate patient in the afternoon today     #Dementia  -C/w donepezil   -frequent reorientation  -keep by by window  - fall precautions  - aspiration precautions   - daughter in law is bedside for most of the day     #Hyponatremia  - stable; outpatient monitoring     Progress Note Handoff  Pending Consults: CM follow up for dc planning  Pending Tests: cbc  Pending Results: cbc  Family Discussion: Discussed labs, meds, PT follow up and dc planning to 4A with pt, her daughter in law and medical staff. All questions answered. PFD for 24hrs.   Disposition: Home_____/SNF__x____/Other_____/Unknown at this time_____  Spent 57 min reviewing chart, speaking with patient/family and on coordinating patient care during interdisciplinary rounds     Please call me with any questions at extension [phone]

## 2025-03-27 NOTE — PROGRESS NOTE ADULT - ASSESSMENT
Imp: Rehab of L hip intertrochanteric fx, s/p ORIF / s/p fall / dementia, bilateral cataracts          Prior to hospitalization she was independent in all activities. Currently she  needs assist with all ADLs and ambulate short distance with walker with assist. Despite her dementia, she can tolerate 3hr/day PT/OT. She can benefit from therapies  and medically necessary. She needs acute inpatient rehab to improve functions for safe return home. Pt also needs physiatrist to monitor her medical status and functional progress during her rehab stay. She warrants acute inpatient rehab.     Plan: continue bedside therapy as tolerated           Good 4a acute inpatient rehab candidate once medically cleared

## 2025-03-27 NOTE — PROGRESS NOTE ADULT - SUBJECTIVE AND OBJECTIVE BOX
Orthopaedic Surgery Progress Note    S&E at bedside this AM. Pain well controlled. Denies fever/chills/CP/SOB. No other complaints    T(C): 36.9 (03-26-25 @ 23:15), Max: 37.3 (03-26-25 @ 16:54)  HR: 77 (03-26-25 @ 23:15) (77 - 80)  BP: 115/64 (03-26-25 @ 23:15) (104/62 - 115/64)  RR: 17 (03-26-25 @ 23:15) (17 - 19)  SpO2: 96% (03-26-25 @ 23:15) (96% - 97%)    P/E:  Alert, NAD  Nonlabored breathing    LLE  Dressing in place, c/d/i  SILT sp/dp/t/sural/saph  Firing ta/ehl/fhl/gs  Foot WWP, 2+ DP pulse    Labs                        8.8    7.65  )-----------( 177      ( 26 Mar 2025 06:12 )             27.6     03-26    134[L]  |  98  |  12  ----------------------------<  124[H]  4.8   |  26  |  0.6[L]    Ca    8.5      26 Mar 2025 06:12  Mg     2.1     03-26    TPro  5.7[L]  /  Alb  3.5  /  TBili  0.3  /  DBili  x   /  AST  17  /  ALT  12  /  AlkPhos  50  03-25    LIVER FUNCTIONS - ( 25 Mar 2025 07:10 )  Alb: 3.5 g/dL / Pro: 5.7 g/dL / ALK PHOS: 50 U/L / ALT: 12 U/L / AST: 17 U/L / GGT: x           A/P:   71yo Female s/p left hip ORIF on 3/24/25.    Weightbearing: WBAT LLE  DVT ppx: SCD, chemical ppx per primary team  Pain control  Incentive spirometer  Home meds  Trend labs  PT/OT/Rehab    - If discharged, patient should follow up with Dr. Frazier at [address] [facility] phone [phone].  - Patient should be discharged on 6 weeks (from surgery date) of Aspirin 81mg twice daily for DVT prophylaxis as their mobility/function/ambulation will be decreased due to lower extremity orthopaedic surgery.

## 2025-03-27 NOTE — PROGRESS NOTE ADULT - SUBJECTIVE AND OBJECTIVE BOX
LIZET WYNNE 70y Female  MRN#: [id number]     Hospital Day: 3d    Pt is currently admitted with the primary diagnosis of  Fracture of unspecified part of neck of left femur, initial encounter for closed fracture        SUBJECTIVE     Subjective complaints  Pt was evaluated this am. Patient has no complaints    Review of systems  ROS is negative                                            ----------------------------------------------------------    HOSPITAL COURSE     3/27 - Patient was seen and examined at bedside in the morning. Still complaining of severe leg pain. will continue to monitor hgb and pain, pt was orthostatic on attempt to ambulate                                            ----------------------------------------------------------  OBJECTIVE  PAST MEDICAL & SURGICAL HISTORY                                            -----------------------------------------------------------  ALLERGIES:  No Known Allergies                                            ------------------------------------------------------------    HOME MEDICATIONS  Home Medications:  donepezil 10 mg oral tablet: 1 tab(s) orally once a day (24 Mar 2025 02:56)  escitalopram 5 mg oral tablet: 1 tab(s) orally once a day (24 Mar 2025 02:56)  multivitamin: 1 cap(s) orally once a day (24 Mar 2025 02:57)                           MEDICATIONS:  STANDING MEDICATIONS  donepezil 10 milliGRAM(s) Oral at bedtime  enoxaparin Injectable 40 milliGRAM(s) SubCutaneous every 24 hours  escitalopram 5 milliGRAM(s) Oral daily  lactated ringers Bolus 1000 milliLiter(s) IV Bolus once  lactated ringers. 1000 milliLiter(s) IV Continuous <Continuous>  multivitamin 1 Tablet(s) Oral daily  polyethylene glycol 3350 17 Gram(s) Oral two times a day  senna 2 Tablet(s) Oral at bedtime    PRN MEDICATIONS  acetaminophen     Tablet .. 650 milliGRAM(s) Oral every 6 hours PRN  morphine  - Injectable 2 milliGRAM(s) IV Push every 6 hours PRN  oxycodone    5 mG/acetaminophen 325 mG 1 Tablet(s) Oral every 6 hours PRN                                            ------------------------------------------------------------  VITAL SIGNS: Last 24 Hours  T(C): 36.7 (27 Mar 2025 07:49), Max: 37.3 (26 Mar 2025 16:54)  T(F): 98 (27 Mar 2025 07:49), Max: 99.2 (26 Mar 2025 16:54)  HR: 78 (27 Mar 2025 12:21) (76 - 80)  BP: 122/67 (27 Mar 2025 12:21) (104/62 - 122/67)  BP(mean): 81 (26 Mar 2025 23:15) (81 - 81)  RR: 16 (27 Mar 2025 07:49) (16 - 18)  SpO2: 95% (27 Mar 2025 07:49) (95% - 97%)      03-26-25 @ 07:01  -  03-27-25 @ 07:00  --------------------------------------------------------  IN: 0 mL / OUT: 1900 mL / NET: -1900 mL                                             --------------------------------------------------------------  LABS:                        8.4    7.22  )-----------( 192      ( 27 Mar 2025 07:28 )             26.0     03-26    134[L]  |  98  |  12  ----------------------------<  124[H]  4.8   |  26  |  0.6[L]    Ca    8.5      26 Mar 2025 06:12  Mg     2.1     03-26        Urinalysis Basic - ( 26 Mar 2025 06:12 )    Color: x / Appearance: x / SG: x / pH: x  Gluc: 124 mg/dL / Ketone: x  / Bili: x / Urobili: x   Blood: x / Protein: x / Nitrite: x   Leuk Esterase: x / RBC: x / WBC x   Sq Epi: x / Non Sq Epi: x / Bacteria: x                                                            -------------------------------------------------------------  RADIOLOGY, TELEMETRY:                                            --------------------------------------------------------------    PHYSICAL EXAM:  GENERAL: NAD, lying in bed comfortably  HEAD:  Atraumatic, Normocephalic  CHEST/LUNG: Clear to auscultation bilaterally; No rales, rhonchi, wheezing, or rubs. Unlabored respirations  HEART: regular rate and rhythm; No murmurs, rubs, or gallops  ABDOMEN: Bowel sounds present; Soft, Nontender, Nondistended.    EXTREMITIES: Warm. No clubbing, cyanosis, or edema  NERVOUS SYSTEM:  Alert & Oriented X3. No focal deficits                                            --------------------------------------------------------------

## 2025-03-27 NOTE — PROGRESS NOTE ADULT - SUBJECTIVE AND OBJECTIVE BOX
Patient is a 70y old  Female who presents with a chief complaint of Fall       HPI:  71 yo F with pmhx of dementia, bilateral cataracts comes in to the ED after a fall. Son at bedside. He states pt was walking down stairs and slipped on her socks while descending the final 3 steps, fell on her side, did not hit her head, no LOC. (-) palpitations, vertigo, SOB, chest pain.    Vitals: Unremarkable; T97.7, HR 64, /67, SpO2 99 on RA  Labs: Hgb 11.4 (baseline unknown), MCV 73.5, otherwise unremarkable.    Imaging:   CTH negative  CT Cspine negative  CTAP: Acute, impacted left femoral intertrochanteric fracture. Sacral Tarlov cysts.  CT LLE: Acute, impacted left femoral intertrochanteric fracture.Soft tissues unremarkable. No hip dislocation. Degenerative change of left hip and left knee.    S/p fluids and morphine 4 mg IVx2 in ED.    S/p L hip ORIF on 3/24/25, Weightbearing: weight bearing as tolerated as per ortho          PHYSICAL EXAM    Vital Signs Last 24 Hrs  T(C): 36.7 (27 Mar 2025 07:49), Max: 37.3 (26 Mar 2025 16:54)  T(F): 98 (27 Mar 2025 07:49), Max: 99.2 (26 Mar 2025 16:54)  HR: 76 (27 Mar 2025 07:49) (76 - 80)  BP: 116/66 (27 Mar 2025 07:49) (104/62 - 116/66)  BP(mean): 81 (26 Mar 2025 23:15) (81 - 81)  RR: 16 (27 Mar 2025 07:49) (16 - 18)  SpO2: 95% (27 Mar 2025 07:49) (95% - 97%)    Parameters below as of 27 Mar 2025 07:49  Patient On (Oxygen Delivery Method): room air        Constitutional - NAD  Chest - CTA  Cardiovascular - S1S2+  Abdomen -  Soft  Extremities -  No calf tenderness   Function : bed mobility min A / transfer mod A                ambulate 5'RW CG assist / lower body dressing mod A     acetaminophen     Tablet .. 650 milliGRAM(s) Oral every 6 hours PRN  donepezil 10 milliGRAM(s) Oral at bedtime  enoxaparin Injectable 40 milliGRAM(s) SubCutaneous every 24 hours  escitalopram 5 milliGRAM(s) Oral daily  morphine  - Injectable 2 milliGRAM(s) IV Push every 6 hours PRN  multivitamin 1 Tablet(s) Oral daily  oxycodone    5 mG/acetaminophen 325 mG 1 Tablet(s) Oral every 6 hours PRN  polyethylene glycol 3350 17 Gram(s) Oral two times a day  senna 2 Tablet(s) Oral at bedtime      RECENT LABS/IMAGING                        8.4    7.22  )-----------( 192      ( 27 Mar 2025 07:28 )             26.0     03-26    134[L]  |  98  |  12  ----------------------------<  124[H]  4.8   |  26  |  0.6[L]    Ca    8.5      26 Mar 2025 06:12  Mg     2.1     03-26        Urinalysis Basic - ( 26 Mar 2025 06:12 )    Color: x / Appearance: x / SG: x / pH: x  Gluc: 124 mg/dL / Ketone: x  / Bili: x / Urobili: x   Blood: x / Protein: x / Nitrite: x   Leuk Esterase: x / RBC: x / WBC x   Sq Epi: x / Non Sq Epi: x / Bacteria: x

## 2025-03-27 NOTE — PROGRESS NOTE ADULT - SUBJECTIVE AND OBJECTIVE BOX
LIZET WYNNE  70y  Female      Patient is a 70y old  Female who presents with a chief complaint of Fall (26 Mar 2025 07:35)      INTERVAL HPI/OVERNIGHT EVENTS:  Patient seen and examined earlier this morning and in the afternoon with her family bedside  lying in bed   in nad  complains of left hip pain  ecchymosis present on left hip  patient was hypotensive upon working with PT today - started on IVF    Vital Signs Last 24 Hrs  T(C): 36.7 (27 Mar 2025 07:49), Max: 37.3 (26 Mar 2025 16:54)  T(F): 98 (27 Mar 2025 07:49), Max: 99.2 (26 Mar 2025 16:54)  HR: 78 (27 Mar 2025 12:21) (76 - 80)  BP: 122/67 (27 Mar 2025 12:21) (104/62 - 122/67)  BP(mean): 81 (26 Mar 2025 23:15) (81 - 81)  RR: 16 (27 Mar 2025 07:49) (16 - 18)  SpO2: 95% (27 Mar 2025 07:49) (95% - 97%)    Parameters below as of 27 Mar 2025 07:49  Patient On (Oxygen Delivery Method): room air      PHYSICAL EXAM:  GENERAL: NAD, well-groomed,   HEAD:  Atraumatic, Normocephalic  EYES: conjunctiva and sclera clear  ENMT: Moist mucous membranes,  No visible lesions  NECK: Supple, No JVD, Normal thyroid  NERVOUS SYSTEM:  Alert & Oriented X3, Good concentration; moves all extremities   CHEST/LUNG: good air entry   HEART: Regular rate and rhythm; No murmurs, rubs, or gallops  ABDOMEN: Soft, Nontender, Nondistended; Bowel sounds present  EXTREMITIES:  2+ Peripheral Pulses, No clubbing, cyanosis, or edema, dressing to left hip  LYMPH: No lymphadenopathy noted  SKIN: ecchymosis to left hip     Consultant(s) Notes Reviewed:  [x ] YES  [ ] NO  Care Discussed with Consultants/Other Providers [ x] YES  [ ] NO    LAB:                                          8.4    7.22  )-----------( 192      ( 27 Mar 2025 07:28 )             26.0     03-26    134[L]  |  98  |  12  ----------------------------<  124[H]  4.8   |  26  |  0.6[L]    Ca    8.5      26 Mar 2025 06:12  Mg     2.1     03-26      Drug Dosing Weight  Height (cm): 170.2 (02 Jun 2024 21:19)  Weight (kg): 78 (24 Mar 2025 10:59)  BMI (kg/m2): 26.9 (24 Mar 2025 10:59)  BSA (m2): 1.9 (24 Mar 2025 10:59)    Urinalysis Basic - ( 26 Mar 2025 06:12 )    Color: x / Appearance: x / SG: x / pH: x  Gluc: 124 mg/dL / Ketone: x  / Bili: x / Urobili: x   Blood: x / Protein: x / Nitrite: x   Leuk Esterase: x / RBC: x / WBC x   Sq Epi: x / Non Sq Epi: x / Bacteria: x        RADIOLOGY & ADDITIONAL TESTS:  Imaging Personally Reviewed:  [x] YES  [ ] NO      MEDICATIONS  (STANDING):  donepezil 10 milliGRAM(s) Oral at bedtime  enoxaparin Injectable 40 milliGRAM(s) SubCutaneous every 24 hours  escitalopram 5 milliGRAM(s) Oral daily  lactated ringers. 1000 milliLiter(s) (100 mL/Hr) IV Continuous <Continuous>  multivitamin 1 Tablet(s) Oral daily  polyethylene glycol 3350 17 Gram(s) Oral two times a day  senna 2 Tablet(s) Oral at bedtime    MEDICATIONS  (PRN):  acetaminophen     Tablet .. 650 milliGRAM(s) Oral every 6 hours PRN Mild Pain (1 - 3)  morphine  - Injectable 2 milliGRAM(s) IV Push every 6 hours PRN Severe Pain (7 - 10)  oxycodone    5 mG/acetaminophen 325 mG 1 Tablet(s) Oral every 6 hours PRN Moderate Pain (4 - 6)

## 2025-03-27 NOTE — PROGRESS NOTE ADULT - ASSESSMENT
69 yo F with pmhx of dementia, bilateral cataracts presenting for Left intertrochanteric hip fracture s/p mechanical fall     #Left intertrochanteric hip fracture  #Mechanical fall  -XR pelvis: Minimally displaced intertrochanteric hip fracture  -CT AP and LLE: Acute, impacted left femoral intertrochanteric fracture.  -Pt ambulates without assistance at baseline   -s/p morphine 4mg IV x2  -Tylenol 650 mg PO Q6H PRN for mild pain  -Tramadol 50 mg PO Q6H PRN for moderate pain  -Oxycodone 5 mg PO Q6H PRN for severe pain  -s/p ORIF  -Pain control   -WBAT  -Incentive spirometery   - HD stable  -PT/OT/Physiatry - 4a inpatient rehab  - Started on bowel regiment, miralax q12h and senna at bedtime, still no bowel movement    #Microcytic anemia  -Hgb 11.1 (no baseline), MCV 73.5  -FHx of anemia  -Iron studies WNL  -Keep Hgb >7.5, active type and screen  -f/u daily cbc, for hgb    #Dementia  -Memory decline  -A&Ox3 at baseline and on my assessment at bedside  Plan  -C/w donepezil     DVT PPX: Lovenox, on hold  GI PPX: Protonix  DIET: NPO for now  ACTIVITY: NWB of LLE  DISPOSITION: pending placement in

## 2025-03-28 ENCOUNTER — INPATIENT (INPATIENT)
Facility: HOSPITAL | Age: 70
LOS: 16 days | Discharge: HOME HEALTH | DRG: 860 | End: 2025-04-14
Attending: PHYSICAL MEDICINE & REHABILITATION | Admitting: PHYSICAL MEDICINE & REHABILITATION
Payer: MEDICAID

## 2025-03-28 ENCOUNTER — TRANSCRIPTION ENCOUNTER (OUTPATIENT)
Age: 70
End: 2025-03-28

## 2025-03-28 VITALS
OXYGEN SATURATION: 100 % | RESPIRATION RATE: 18 BRPM | TEMPERATURE: 98 F | HEART RATE: 80 BPM | SYSTOLIC BLOOD PRESSURE: 104 MMHG

## 2025-03-28 VITALS
DIASTOLIC BLOOD PRESSURE: 64 MMHG | HEART RATE: 81 BPM | TEMPERATURE: 99 F | RESPIRATION RATE: 18 BRPM | SYSTOLIC BLOOD PRESSURE: 111 MMHG

## 2025-03-28 DIAGNOSIS — S72.002A FRACTURE OF UNSPECIFIED PART OF NECK OF LEFT FEMUR, INITIAL ENCOUNTER FOR CLOSED FRACTURE: ICD-10-CM

## 2025-03-28 LAB
BASOPHILS # BLD AUTO: 0.06 K/UL — SIGNIFICANT CHANGE UP (ref 0–0.2)
BASOPHILS NFR BLD AUTO: 0.8 % — SIGNIFICANT CHANGE UP (ref 0–1)
EOSINOPHIL # BLD AUTO: 0.33 K/UL — SIGNIFICANT CHANGE UP (ref 0–0.7)
EOSINOPHIL NFR BLD AUTO: 4.2 % — SIGNIFICANT CHANGE UP (ref 0–8)
HCT VFR BLD CALC: 25.9 % — LOW (ref 37–47)
HGB BLD-MCNC: 8.3 G/DL — LOW (ref 12–16)
IMM GRANULOCYTES NFR BLD AUTO: 0.4 % — HIGH (ref 0.1–0.3)
LYMPHOCYTES # BLD AUTO: 2.38 K/UL — SIGNIFICANT CHANGE UP (ref 1.2–3.4)
LYMPHOCYTES # BLD AUTO: 30.3 % — SIGNIFICANT CHANGE UP (ref 20.5–51.1)
MCHC RBC-ENTMCNC: 22.9 PG — LOW (ref 27–31)
MCHC RBC-ENTMCNC: 32 G/DL — SIGNIFICANT CHANGE UP (ref 32–37)
MCV RBC AUTO: 71.5 FL — LOW (ref 81–99)
MONOCYTES # BLD AUTO: 0.88 K/UL — HIGH (ref 0.1–0.6)
MONOCYTES NFR BLD AUTO: 11.2 % — HIGH (ref 1.7–9.3)
NEUTROPHILS # BLD AUTO: 4.17 K/UL — SIGNIFICANT CHANGE UP (ref 1.4–6.5)
NEUTROPHILS NFR BLD AUTO: 53.1 % — SIGNIFICANT CHANGE UP (ref 42.2–75.2)
NRBC BLD AUTO-RTO: 0 /100 WBCS — SIGNIFICANT CHANGE UP (ref 0–0)
PLATELET # BLD AUTO: 221 K/UL — SIGNIFICANT CHANGE UP (ref 130–400)
PMV BLD: 10.4 FL — SIGNIFICANT CHANGE UP (ref 7.4–10.4)
RBC # BLD: 3.62 M/UL — LOW (ref 4.2–5.4)
RBC # FLD: 15.7 % — HIGH (ref 11.5–14.5)
WBC # BLD: 7.85 K/UL — SIGNIFICANT CHANGE UP (ref 4.8–10.8)
WBC # FLD AUTO: 7.85 K/UL — SIGNIFICANT CHANGE UP (ref 4.8–10.8)

## 2025-03-28 PROCEDURE — 83735 ASSAY OF MAGNESIUM: CPT

## 2025-03-28 PROCEDURE — 82306 VITAMIN D 25 HYDROXY: CPT

## 2025-03-28 PROCEDURE — 97116 GAIT TRAINING THERAPY: CPT | Mod: GP

## 2025-03-28 PROCEDURE — 97162 PT EVAL MOD COMPLEX 30 MIN: CPT | Mod: GP

## 2025-03-28 PROCEDURE — 85025 COMPLETE CBC W/AUTO DIFF WBC: CPT

## 2025-03-28 PROCEDURE — 36415 COLL VENOUS BLD VENIPUNCTURE: CPT

## 2025-03-28 PROCEDURE — 97535 SELF CARE MNGMENT TRAINING: CPT | Mod: GO

## 2025-03-28 PROCEDURE — 97530 THERAPEUTIC ACTIVITIES: CPT | Mod: GO

## 2025-03-28 PROCEDURE — 99239 HOSP IP/OBS DSCHRG MGMT >30: CPT

## 2025-03-28 PROCEDURE — 80053 COMPREHEN METABOLIC PANEL: CPT

## 2025-03-28 PROCEDURE — 94640 AIRWAY INHALATION TREATMENT: CPT

## 2025-03-28 PROCEDURE — 97010 HOT OR COLD PACKS THERAPY: CPT | Mod: GP

## 2025-03-28 PROCEDURE — 97166 OT EVAL MOD COMPLEX 45 MIN: CPT | Mod: GO

## 2025-03-28 PROCEDURE — 97110 THERAPEUTIC EXERCISES: CPT | Mod: GO

## 2025-03-28 RX ORDER — MELATONIN 5 MG
5 TABLET ORAL AT BEDTIME
Refills: 0 | Status: DISCONTINUED | OUTPATIENT
Start: 2025-03-28 | End: 2025-04-14

## 2025-03-28 RX ORDER — ENOXAPARIN SODIUM 100 MG/ML
40 INJECTION SUBCUTANEOUS
Qty: 0 | Refills: 0 | DISCHARGE
Start: 2025-03-28

## 2025-03-28 RX ORDER — CELECOXIB 50 MG/1
100 CAPSULE ORAL
Refills: 0 | Status: DISCONTINUED | OUTPATIENT
Start: 2025-03-29 | End: 2025-04-03

## 2025-03-28 RX ORDER — DONEPEZIL HYDROCHLORIDE 5 MG/1
10 TABLET ORAL AT BEDTIME
Refills: 0 | Status: DISCONTINUED | OUTPATIENT
Start: 2025-03-28 | End: 2025-04-14

## 2025-03-28 RX ORDER — OXYCODONE HYDROCHLORIDE AND ACETAMINOPHEN 10; 325 MG/1; MG/1
1 TABLET ORAL
Qty: 0 | Refills: 0 | DISCHARGE
Start: 2025-03-28

## 2025-03-28 RX ORDER — ACETAMINOPHEN 500 MG/5ML
650 LIQUID (ML) ORAL EVERY 6 HOURS
Refills: 0 | Status: DISCONTINUED | OUTPATIENT
Start: 2025-03-28 | End: 2025-03-28

## 2025-03-28 RX ORDER — POLYETHYLENE GLYCOL 3350 17 G/17G
17 POWDER, FOR SOLUTION ORAL
Refills: 0 | Status: DISCONTINUED | OUTPATIENT
Start: 2025-03-28 | End: 2025-03-29

## 2025-03-28 RX ORDER — SENNA 187 MG
2 TABLET ORAL
Qty: 0 | Refills: 0 | DISCHARGE
Start: 2025-03-28

## 2025-03-28 RX ORDER — ENOXAPARIN SODIUM 100 MG/ML
40 INJECTION SUBCUTANEOUS EVERY 24 HOURS
Refills: 0 | Status: DISCONTINUED | OUTPATIENT
Start: 2025-03-28 | End: 2025-04-14

## 2025-03-28 RX ORDER — SENNA 187 MG
2 TABLET ORAL AT BEDTIME
Refills: 0 | Status: DISCONTINUED | OUTPATIENT
Start: 2025-03-28 | End: 2025-03-29

## 2025-03-28 RX ORDER — ACETAMINOPHEN 500 MG/5ML
975 LIQUID (ML) ORAL EVERY 8 HOURS
Refills: 0 | Status: DISCONTINUED | OUTPATIENT
Start: 2025-03-28 | End: 2025-04-14

## 2025-03-28 RX ORDER — OXYCODONE HYDROCHLORIDE 30 MG/1
5 TABLET ORAL EVERY 4 HOURS
Refills: 0 | Status: DISCONTINUED | OUTPATIENT
Start: 2025-03-28 | End: 2025-03-29

## 2025-03-28 RX ORDER — POLYETHYLENE GLYCOL 3350 17 G/17G
17 POWDER, FOR SOLUTION ORAL
Qty: 0 | Refills: 0 | DISCHARGE
Start: 2025-03-28

## 2025-03-28 RX ORDER — ESCITALOPRAM OXALATE 20 MG/1
5 TABLET ORAL DAILY
Refills: 0 | Status: DISCONTINUED | OUTPATIENT
Start: 2025-03-28 | End: 2025-04-14

## 2025-03-28 RX ORDER — B1/B2/B3/B5/B6/B12/VIT C/FOLIC 500-0.5 MG
1 TABLET ORAL DAILY
Refills: 0 | Status: DISCONTINUED | OUTPATIENT
Start: 2025-03-28 | End: 2025-04-14

## 2025-03-28 RX ORDER — ACETAMINOPHEN 500 MG/5ML
2 LIQUID (ML) ORAL
Qty: 0 | Refills: 0 | DISCHARGE
Start: 2025-03-28

## 2025-03-28 RX ADMIN — SODIUM CHLORIDE 100 MILLILITER(S): 9 INJECTION, SOLUTION INTRAVENOUS at 09:52

## 2025-03-28 RX ADMIN — Medication 1 TABLET(S): at 10:54

## 2025-03-28 RX ADMIN — POLYETHYLENE GLYCOL 3350 17 GRAM(S): 17 POWDER, FOR SOLUTION ORAL at 17:24

## 2025-03-28 RX ADMIN — ENOXAPARIN SODIUM 40 MILLIGRAM(S): 100 INJECTION SUBCUTANEOUS at 21:41

## 2025-03-28 RX ADMIN — DONEPEZIL HYDROCHLORIDE 10 MILLIGRAM(S): 5 TABLET ORAL at 21:41

## 2025-03-28 RX ADMIN — ESCITALOPRAM OXALATE 5 MILLIGRAM(S): 20 TABLET ORAL at 10:56

## 2025-03-28 RX ADMIN — Medication 100 MILLILITER(S): at 21:50

## 2025-03-28 RX ADMIN — POLYETHYLENE GLYCOL 3350 17 GRAM(S): 17 POWDER, FOR SOLUTION ORAL at 05:44

## 2025-03-28 RX ADMIN — Medication 2 TABLET(S): at 21:41

## 2025-03-28 NOTE — DISCHARGE NOTE NURSING/CASE MANAGEMENT/SOCIAL WORK - NSDCVIVACCINE_GEN_ALL_CORE_FT
Tdap; 02-Jun-2024 22:42; Jenise Pratt (RN); [employer]; [id number] (Exp. Date: 01-Mar-2026); IntraMuscular; Deltoid Right.; 0.5 milliLiter(s); VIS (VIS Published: 09-May-2013, VIS Presented: 02-Jun-2024);

## 2025-03-28 NOTE — H&P ADULT - NSHPLABSRESULTS_GEN_ALL_CORE
8.3    7.85  )-----------( 221      ( 28 Mar 2025 08:02 )             25.9     < from: CT Lower Extremity No Cont, Left (03.24.25 @ 00:39) >    IMPRESSION:  Acute, impacted left femoral intertrochanteric fracture.      < end of copied text >

## 2025-03-28 NOTE — PROGRESS NOTE ADULT - SUBJECTIVE AND OBJECTIVE BOX
LIZET WYNNE 70y Female  MRN#: [id number]     Hospital Day: 4d    Pt is currently admitted with the primary diagnosis of  Fracture of unspecified part of neck of left femur, initial encounter for closed fracture        SUBJECTIVE     Subjective complaints  Pt was evaluated this am. Patient has no complaints    Review of systems  ROS is negative                                            ----------------------------------------------------------    HOSPITAL COURSE     3/27 - Patient was seen and examined at bedside in the morning. Still complaining of severe leg pain. will continue to monitor hgb and pain, pt was orthostatic on attempt to ambulate  3/28 - Patient was seen and examined at bedside in the morning. Still complaining of leg pain, monitoring hgb which has been stable.                                             ----------------------------------------------------------  OBJECTIVE  PAST MEDICAL & SURGICAL HISTORY                                            -----------------------------------------------------------  ALLERGIES:  No Known Allergies                                            ------------------------------------------------------------    HOME MEDICATIONS  Home Medications:  donepezil 10 mg oral tablet: 1 tab(s) orally once a day (24 Mar 2025 02:56)  escitalopram 5 mg oral tablet: 1 tab(s) orally once a day (24 Mar 2025 02:56)  multivitamin: 1 cap(s) orally once a day (24 Mar 2025 02:57)                           MEDICATIONS:  STANDING MEDICATIONS  donepezil 10 milliGRAM(s) Oral at bedtime  enoxaparin Injectable 40 milliGRAM(s) SubCutaneous every 24 hours  escitalopram 5 milliGRAM(s) Oral daily  multivitamin 1 Tablet(s) Oral daily  polyethylene glycol 3350 17 Gram(s) Oral two times a day  senna 2 Tablet(s) Oral at bedtime    PRN MEDICATIONS  acetaminophen     Tablet .. 650 milliGRAM(s) Oral every 6 hours PRN  morphine  - Injectable 2 milliGRAM(s) IV Push every 6 hours PRN  oxycodone    5 mG/acetaminophen 325 mG 1 Tablet(s) Oral every 6 hours PRN                                            ------------------------------------------------------------  VITAL SIGNS: Last 24 Hours  T(C): 37.1 (28 Mar 2025 07:23), Max: 37.1 (27 Mar 2025 23:10)  T(F): 98.7 (28 Mar 2025 07:23), Max: 98.7 (27 Mar 2025 23:10)  HR: 77 (28 Mar 2025 07:23) (77 - 98)  BP: 103/61 (28 Mar 2025 07:23) (103/61 - 133/68)  BP(mean): 86 (27 Mar 2025 23:10) (86 - 86)  RR: 20 (28 Mar 2025 07:23) (17 - 20)  SpO2: 95% (28 Mar 2025 07:23) (95% - 98%)      03-27-25 @ 07:01  -  03-28-25 @ 07:00  --------------------------------------------------------  IN: 100 mL / OUT: 850 mL / NET: -750 mL                                             --------------------------------------------------------------  LABS:                        8.3    7.85  )-----------( 221      ( 28 Mar 2025 08:02 )             25.9                                                                     -------------------------------------------------------------  RADIOLOGY, TELEMETRY:                                            --------------------------------------------------------------    PHYSICAL EXAM:  GENERAL: NAD, lying in bed comfortably  HEAD:  Atraumatic, Normocephalic  CHEST/LUNG: Clear to auscultation bilaterally; No rales, rhonchi, wheezing, or rubs. Unlabored respirations  HEART: regular rate and rhythm; No murmurs, rubs, or gallops  ABDOMEN: Bowel sounds present; Soft, Nontender, Nondistended.    EXTREMITIES: Warm. No clubbing, cyanosis, or edema, extensive bruising on the left hip  NERVOUS SYSTEM:  Alert & Oriented X3. No focal deficits                                            --------------------------------------------------------------

## 2025-03-28 NOTE — DISCHARGE NOTE NURSING/CASE MANAGEMENT/SOCIAL WORK - FINANCIAL ASSISTANCE
[facility] provides services at a reduced cost to those who are determined to be eligible through [facility]’s financial assistance program. Information regarding [facility]’s financial assistance program can be found by going to [email or web address] or by calling [phone].

## 2025-03-28 NOTE — DISCHARGE NOTE PROVIDER - CARE PROVIDERS DIRECT ADDRESSES
[email or web address]

## 2025-03-28 NOTE — PROGRESS NOTE ADULT - PROVIDER SPECIALTY LIST ADULT
Hospitalist
Orthopedics
Orthopedics
Hospitalist
Internal Medicine
Orthopedics
Hospitalist
Internal Medicine
Internal Medicine
Orthopedics
Physiatry
Internal Medicine

## 2025-03-28 NOTE — DISCHARGE NOTE PROVIDER - NSDCCPCAREPLAN_GEN_ALL_CORE_FT
PRINCIPAL DISCHARGE DIAGNOSIS  Diagnosis: Fracture of left hip  Assessment and Plan of Treatment: You came in to the ED after a fall and was found to have a fracture in your left hip. You were seen by our orthopedic surgery team and taken for surgery. Following surgery, we continued to monitor your pain and rehabilitation. You were also assessed by our Physiatry team who found you to be a good candidate for inpatient rehabilitation. You will be discharged from out care, to their care. Please take your medications as prescribed and please follow up with your providers by calling them to make an appointment so that you can see them in 1-2 weeks; bring your paperwork from this hospital stay to that visit.  Seek immediate medical attention if you develop fevers, chills, chest pain, shortness of breath, nausea and vomiting, abdominal pain, passing out, weakness or numbness or tingling on one side of your body, or any other concerning signs or symptoms.

## 2025-03-28 NOTE — H&P ADULT - ASSESSMENT
ASSESSMENT/PLAN    69 yo F with pmhx of dementia and bilateral cataracts presenting for mechanism fall with Left intertrochanteric hip s/p ORIF 3/24    #Rehab of gait abnormality and decline in function 2/2 L hip IT fx s/p ORIF 3/26  -CT LLE: Acute, impacted left femoral intertrochanteric fracture.  -c/w tylenol 975mg q8h, celebrex qAM, and prn oxycodone for pain   -WBAT LLE  -PT/OT Eval and treat: Patient requires 3 hrs daily of interdisciplinary inpatient rehab at least 5 days a week.     #Post op anemia  -Hgb 11.9 on admission   -Hgb 8.4 3/27  -Monitor CBC, transfuse <7    #Dementia  -C/w home med donepezil 10mg qhs    #Depression/Anxiety  -c/w home med escitalopram 5mg daily     -Pain control: tylenol 975mg q8h, celebrex qAM, prn oxycodone     -GI/Bowel Mgmt: senna/miralax     - Diet: Regular, Halal      Precautions / PROPHYLAXIS:      - Falls    - Ortho: Weight bearing status: WBAT LLE      - DVT prophylaxis: lovenox       MEDICAL PROGNOSIS: GOOD            REHAB POTENTIAL: GOOD             ESTIMATED DISPOSITION: HOME WITH HOME CARE       [ x ]  The goals of the IRF admission were discussed with the patient and or family member, who agreed             ELOS:  [  x   ] 7-14    [    ]  14-21    [    ]  Other    THERAPY ORDERS and INITIAL INDIVIDUALIZED PLAN OF CARE:  This initial individualized interdisciplinary plan of care, which was established by me (the attending physiatrist), is based on elements from the post admission evaluation. The interdisciplinary therapy program is to be at least 3 hrs a day, at least 5 days per week from from physical, occupational and/ or speech therapies as ordered by me below.      [ x  ] P.T. 90 mins. /day at least 5 out of 7 days:  [  x ] superficial  modalities prn, [ x  ] A/AAROM, [ x  ] PREs, [ x  ] transfer training,            [ x  ] progressive ambulation, [x   ] stairs                                               [ x  ] O.T. 90 mins. /day at least 5 out of 7 days::  [ x  ] modalities prn,  [ x  ]A/AAROM, [ x  ] PREs, [  x ] functional transfer training, [ x  ] ADL training           [   ] cognitive/ perceptual eval and training, [   ] splint eval                                                  [   ] S.L.P:  [   ] speech eval, [   ] swallow eval     [   ] Neuropsychology eval     [ x  ] Individualized rec. therapy      RATIONALE FOR INPATIENT ADMISSION - Patient demonstrates the following: (check all that apply)  [X] Medically appropriate for acute rehabilitation admission. Requires interdisiplinary therapy consisting of at least PT and OT, at least 3 hrs. a day at least 5 days a week  [X] Has attainable rehab goals with an appropriate initial discharge plan  [X] Has rehabilitation potential (expected to make a significant improvement within a reasonable period of time)  [X] Requires close medical management by a rehab physician, rehab nursing care,  and comprehensive interdisciplinary team (including PT, OT)    [X] Requires evaluation by a physiatrist at least 3 days a week to evaluate and manage and coordinate rehab and medical problems   ASSESSMENT/PLAN    71 yo F with pmhx of dementia and bilateral cataracts presenting for mechanism fall with Left intertrochanteric hip s/p ORIF 3/24    #Rehab of gait abnormality and decline in function 2/2 L hip IT fx s/p ORIF 3/26  -CT LLE: Acute, impacted left femoral intertrochanteric fracture.  -c/w Tylenol 975mg q8h ATC, celebrex 100 mg q8AM, and oxycodone 5 mg  q6h prn for pain   -WBAT on the LLE  -PT/OT Eval and treat: Patient requires 3 hrs daily of interdisciplinary inpatient rehab at least 5 days a week.     #Post op anemia, left thigh echymosis  -Hgb 11.9 on admission   -Hgb 8.4 3/27  -Monitor CBC, transfuse <7    #Orthostatic hypotension  Close monitoring of her vitals  500 cc NS fluid bolus       #Dementia, Mild  She benefit from and tolerate 3 hrs of daily intensive therapies.   -C/w home med donepezil 10mg qhs    #Anxiety/depression  -c/w home med escitalopram 5mg daily     -Pain control: Tylenol 975mg q8h, celebrex 100 mg qAM,;  oxycodone 5 mg po q6h prn    -GI/Bowel Mgmt: senna/miralax     - Diet: Regular, Halal      Precautions / PROPHYLAXIS:      - Falls    - Ortho: Weight bearing status: WBAT LLE      - DVT prophylaxis: lovenox       MEDICAL PROGNOSIS: GOOD            REHAB POTENTIAL: GOOD             ESTIMATED DISPOSITION: HOME WITH HOME CARE       [ x ]  The goals of the IRF admission were discussed with the patient and or family member, who agreed             ELOS:  [  x   ] 7-14    [    ]  14-21    [    ]  Other    THERAPY ORDERS and INITIAL INDIVIDUALIZED PLAN OF CARE:  This initial individualized interdisciplinary plan of care, which was established by me (the attending physiatrist), is based on elements from the post admission evaluation. The interdisciplinary therapy program is to be at least 3 hrs a day, at least 5 days per week from physical, occupational and/ or speech therapies as ordered by me below.      [ x  ] P.T. 90 mins. /day at least 5 out of 7 days:  [  x ] superficial  modalities prn, [ x  ] A/AAROM, [ x  ] PREs, [ x  ] transfer training,            [ x  ] progressive ambulation, [x   ] stairs                                               [ x  ] O.T. 90 mins. /day at least 5 out of 7 days::  [ x  ] modalities prn,  [ x  ]A/AAROM, [ x  ] PREs, [  x ] functional transfer training, [ x  ] ADL training           [   ] cognitive/ perceptual eval and training, [   ] splint eval                                                  [   ] S.L.P:  [   ] speech eval, [   ] swallow eval     [   ] Neuropsychology eval     [ x  ] Individualized rec. therapy      RATIONALE FOR INPATIENT ADMISSION - Patient demonstrates the following: (check all that apply)  [X] Medically appropriate for acute rehabilitation admission. Requires interdisciplinary therapy consisting of at least PT and OT, at least 3 hrs. a day at least 5 days a week  [X] Has attainable rehab goals with an appropriate initial discharge plan  [X] Has rehabilitation potential (expected to make a significant improvement within a reasonable period of time)  [X] Requires close medical management by a rehab physician, rehab nursing care,  and comprehensive interdisciplinary team (including PT, OT)    [X] Requires evaluation by a physiatrist at least 3 days a week to evaluate and manage and coordinate rehab and medical problems   ASSESSMENT/PLAN    71 yo F with pmhx of mild dementia, anxiety bilateral cataracts presenting for mechanism fall with Left intertrochanteric hip s/p ORIF 3/24. Postop coarse complicated by orthostatic hypotension postop anemia. Her hip fracture is quite painful.      #Rehab of gait abnormality and decline in function 2/2 L hip IT fx s/p ORIF 3/26  -CT LLE: Acute, impacted left femoral intertrochanteric fracture.  -c/w Tylenol 975mg q8h ATC, celebrex 100 mg q8AM, and oxycodone 5 mg  q6h prn for pain   -WBAT on the LLE  -PT/OT Eval and treat: Patient requires 3 hrs daily of interdisciplinary inpatient rehab at least 5 days a week.     #Post op anemia, left thigh echymosis  -Hgb 11.9 on admission   -Hgb 8.4 3/27  -Monitor CBC, transfuse <7    #Orthostatic hypotension  Close monitoring of her vitals  500 cc NS fluid bolus       #Dementia, Mild  She benefit from and tolerate 3 hrs of daily intensive therapies.   -C/w home med donepezil 10mg qhs    #Anxiety/depression  -c/w home med escitalopram 5mg daily     -Pain control: Tylenol 975mg q8h, celebrex 100 mg qAM,;  oxycodone 5 mg po q6h prn    -GI/Bowel Mgmt: senna/miralax     - Diet: Regular, Halal      Precautions / PROPHYLAXIS:      - Falls    - Ortho: Weight bearing status: WBAT LLE      - DVT prophylaxis: lovenox       MEDICAL PROGNOSIS: GOOD            REHAB POTENTIAL: GOOD             ESTIMATED DISPOSITION: HOME WITH HOME CARE       [ x ]  The goals of the IRF admission were discussed with the patient and or family member, who agreed             ELOS:  [  x   ] 7-14    [    ]  14-21    [    ]  Other    THERAPY ORDERS and INITIAL INDIVIDUALIZED PLAN OF CARE:  This initial individualized interdisciplinary plan of care, which was established by me (the attending physiatrist), is based on elements from the post admission evaluation. The interdisciplinary therapy program is to be at least 3 hrs a day, at least 5 days per week from physical, occupational and/ or speech therapies as ordered by me below.      [ x  ] P.T. 90 mins. /day at least 5 out of 7 days:  [  x ] superficial  modalities prn, [ x  ] A/AAROM, [ x  ] PREs, [ x  ] transfer training,            [ x  ] progressive ambulation, [x   ] stairs                                               [ x  ] O.T. 90 mins. /day at least 5 out of 7 days::  [ x  ] modalities prn,  [ x  ]A/AAROM, [ x  ] PREs, [  x ] functional transfer training, [ x  ] ADL training           [   ] cognitive/ perceptual eval and training, [   ] splint eval                                                  [   ] S.L.P:  [   ] speech eval, [   ] swallow eval     [   ] Neuropsychology eval     [ x  ] Individualized rec. therapy      RATIONALE FOR INPATIENT ADMISSION - Patient demonstrates the following: (check all that apply)  [X] Medically appropriate for acute rehabilitation admission. Requires interdisciplinary therapy consisting of at least PT and OT, at least 3 hrs. a day at least 5 days a week  [X] Has attainable rehab goals with an appropriate initial discharge plan  [X] Has rehabilitation potential (expected to make a significant improvement within a reasonable period of time)  [X] Requires close medical management by a rehab physician, rehab nursing care,  and comprehensive interdisciplinary team (including PT, OT)    [X] Requires evaluation by a physiatrist at least 3 days a week to evaluate and manage and coordinate rehab and medical problems

## 2025-03-28 NOTE — DISCHARGE NOTE PROVIDER - PROVIDER TOKENS
PROVIDER:[TOKEN:[[id number]]] PROVIDER:[TOKEN:[[id number]]],PROVIDER:[TOKEN:[[id number]],FOLLOWUP:[2 weeks]]

## 2025-03-28 NOTE — DISCHARGE NOTE PROVIDER - HOSPITAL COURSE
Medicine attending -    patient seen and examined this morning with her daughter in law bedside  lying comfortably in bed  in nad  no complaints  hgb stable; hematoma to left thigh present  worked with PT today - ambulated 20 feet. Orthostatics negative  patient will be transferred to  today for inpatient rehab    Vital Signs Last 24 Hrs  T(C): 37.1 (28 Mar 2025 07:23), Max: 37.1 (27 Mar 2025 23:10)  T(F): 98.7 (28 Mar 2025 07:23), Max: 98.7 (27 Mar 2025 23:10)  HR: 77 (28 Mar 2025 07:23) (77 - 98)  BP: 103/61 (28 Mar 2025 07:23) (103/61 - 133/68)  BP(mean): 86 (27 Mar 2025 23:10) (86 - 86)  RR: 20 (28 Mar 2025 07:23) (17 - 20)  SpO2: 95% (28 Mar 2025 07:23) (95% - 98%)    Parameters below as of 28 Mar 2025 07:23  Patient On (Oxygen Delivery Method): room air    D/C today; d/c planning took over 75 min  d/c papers done by me  discussed d/c plan and all instructions in detail 69 yo F with pmhx of dementia, bilateral cataracts presenting for Left intertrochanteric hip fracture s/p mechanical fall     #Left intertrochanteric hip fracture  #Mechanical fall  #Orthostatic Hypotension (resolved)  -XR pelvis: Minimally displaced intertrochanteric hip fracture  -CT AP and LLE: Acute, impacted left femoral intertrochanteric fracture.  -Pt ambulates without assistance at baseline   -s/p morphine 4mg IV x2  -Tylenol 650 mg PO Q6H PRN for mild pain  -Tramadol 50 mg PO Q6H PRN for moderate pain  -Oxycodone 5 mg PO Q6H PRN for severe pain  -s/p ORIF  -Pain control   -WBAT  -Incentive spirometery   - HD stable  -PT/OT/Physiatry -  inpatient rehab  - Started on bowel regiment, miralax q12h and senna at bedtime, still no bowel movement  -Given one L bolus 3/27, will continue with fluid hydration to avoid any hypotension    #Microcytic anemia  -Hgb 11.1 (no baseline), MCV 73.5  -FHx of anemia  -Iron studies WNL  -Keep Hgb >7.5, active type and screen  -f/u daily cbc, for hgb  -3/28, Hgb 8.3 <- 8     #Dementia  -Memory decline  -A&Ox3 at baseline and on my assessment at bedside  -C/w donepezil         Medicine attending -  patient seen and examined this morning with her daughter in law bedside  lying comfortably in bed  in nad  no complaints  hgb stable; hematoma to left thigh present  worked with PT today - ambulated 20 feet. Orthostatics negative  patient will be transferred to  today for inpatient rehab    Vital Signs Last 24 Hrs  T(C): 37.1 (28 Mar 2025 07:23), Max: 37.1 (27 Mar 2025 23:10)  T(F): 98.7 (28 Mar 2025 07:23), Max: 98.7 (27 Mar 2025 23:10)  HR: 77 (28 Mar 2025 07:23) (77 - 98)  BP: 103/61 (28 Mar 2025 07:23) (103/61 - 133/68)  BP(mean): 86 (27 Mar 2025 23:10) (86 - 86)  RR: 20 (28 Mar 2025 07:23) (17 - 20)  SpO2: 95% (28 Mar 2025 07:23) (95% - 98%)    Parameters below as of 28 Mar 2025 07:23  Patient On (Oxygen Delivery Method): room air    D/C today; d/c planning took over 75 min  d/c papers done by me  discussed d/c plan and all instructions in detail 69 yo F with pmhx of dementia, bilateral cataracts presenting for Left intertrochanteric hip fracture s/p mechanical fall     #Left intertrochanteric hip fracture  #Mechanical fall  #Orthostatic Hypotension (resolved)  -XR pelvis: Minimally displaced intertrochanteric hip fracture  -CT AP and LLE: Acute, impacted left femoral intertrochanteric fracture.  -Pt ambulates without assistance at baseline   -s/p morphine 4mg IV x2  -Tylenol 650 mg PO Q6H PRN for mild pain  -Tramadol 50 mg PO Q6H PRN for moderate pain  -Oxycodone 5 mg PO Q6H PRN for severe pain  -s/p ORIF  -Pain control   -WBAT  -Incentive spirometery   - HD stable  -PT/OT/Physiatry -  inpatient rehab  - Started on bowel regiment, miralax q12h and senna at bedtime, still no bowel movement  -Given one L bolus 3/27, will continue with fluid hydration to avoid any hypotension    #Microcytic anemia  -Hgb 11.1 (no baseline), MCV 73.5  -FHx of anemia  -Iron studies WNL  -Keep Hgb >7.5, active type and screen  -f/u daily cbc, for hgb  -3/28, Hgb 8.3 <- 8     #Dementia  -Memory decline  -A&Ox3 at baseline and on my assessment at bedside  -C/w donepezil         Medicine attending -  patient seen and examined this morning with her daughter in law bedside  lying comfortably in bed  in nad  no complaints  hgb stable; hematoma to left thigh present  worked with PT today - ambulated 20 feet. Orthostatics negative  patient will be transferred to  today for inpatient rehab    Vital Signs Last 24 Hrs  T(C): 37.1 (28 Mar 2025 07:23), Max: 37.1 (27 Mar 2025 23:10)  T(F): 98.7 (28 Mar 2025 07:23), Max: 98.7 (27 Mar 2025 23:10)  HR: 77 (28 Mar 2025 07:23) (77 - 98)  BP: 103/61 (28 Mar 2025 07:23) (103/61 - 133/68)  BP(mean): 86 (27 Mar 2025 23:10) (86 - 86)  RR: 20 (28 Mar 2025 07:23) (17 - 20)  SpO2: 95% (28 Mar 2025 07:23) (95% - 98%)    Parameters below as of 28 Mar 2025 07:23  Patient On (Oxygen Delivery Method): room air    D/C today; d/c planning took over 75 min  d/c papers edited by me  discussed d/c plan and all instructions in detail

## 2025-03-28 NOTE — DISCHARGE NOTE PROVIDER - CARE PROVIDER_API CALL
Benson Pineda  Physical/Rehab Medicine  [address] [place] [address]  Phone: [phone]  Fax: [phone]  Follow Up Time:    Benson Pineda  Physical/Rehab Medicine  [address] [place] [address]  Phone: [phone]  Fax: [phone]  Follow Up Time:     Jerrod Frazier  Orthopaedic Surgery  [address] [place] [address]  Phone: [phone]  Fax: [phone]  Follow Up Time: 2 weeks

## 2025-03-28 NOTE — H&P ADULT - HISTORY OF PRESENT ILLNESS
69 yo F with pmhx of dementia, bilateral cataracts presented to the ED 3/24/25 s/p fall. Son stated that the pt was walking down stairs and slipped on her socks while descending the final 3 steps and fell on her left side. No head trauma or LOC. In ED CT LLE showed acute, impacted left femoral intertrochanteric fracture. Patient was evaluated by orthopedics and is s/p L hip ORIF 3/24. Post op course complicated by post op anemia and orthostatic hypotension s/p IVF, will continue to monitor in acute rehab.     Imaging:   XR Left Femur: Comminuted trochanteric fracture.  CTH negative  CT Cspine negative  CTAP: Acute, impacted left femoral intertrochanteric fracture. Sacral Tarlov cysts.  CT LLE: Acute, impacted left femoral intertrochanteric fracture.Soft tissues unremarkable. No hip dislocation. Degenerative change of left hip and left knee.      Prior to admission, the patient was independent in ADLs and ambulation without an assistive device. Patient now requires assistance with ambulation and ADLs 2/2 to L Hip ORIF. There is a significant functional decline from baseline. To return home it is in the patient’s best interest to have acute inpatient rehabilitative services to undergo intensive interdisciplinary therapy. Furthermore, the patient is motivated and is able to tolerate up to 3 hours of daily therapy for 5-6 days a week for a total of 15 hours a week. Evaluated by Physiatry and deemed to be an excellent candidate for admission to  for acute inpatient rehab. Admitted to Acute Rehab on 3/27/25.    Pt seen and examined by Physiatry and is currently functioning at [facility] bed mobility, modA transfers, 20’ RW modA, UBD standy-by, LBD modA.    LS: lives with family in  with 5[employer] and [employer] Inside  PLOF: independent in ADLs and ambulate with no assistive device     71 yo F with PMH of mild dementia, anxiety, bilateral cataracts presented to the ED 3/24/25 s/p fall. Son stated that the pt was walking down stairs and slipped on her socks while descending the final 3 steps and fell on her left side. No head trauma or LOC. In ED CT LLE showed acute, impacted left femoral intertrochanteric fracture. Patient was evaluated by orthopedics and is s/p L hip ORIF 3/24. Post op course complicated by post op anemia and orthostatic hypotension s/p IVF, will continue to monitor in acute rehab.     Imaging:   XR Left Femur: Comminuted trochanteric fracture.  CTH negative  CT Cspine negative  CTAP: Acute, impacted left femoral intertrochanteric fracture. Sacral Tarlov cysts.  CT LLE: Acute, impacted left femoral intertrochanteric fracture.Soft tissues unremarkable. No hip dislocation. Degenerative change of left hip and left knee.      Prior to admission, the patient was independent in ADLs and ambulation without an assistive device. Patient now requires assistance with ambulation and ADLs 2/2 to L Hip ORIF. There is a significant functional decline from baseline. To return home it is in the patient’s best interest to have acute inpatient rehabilitative services to undergo intensive interdisciplinary therapy. Furthermore, the patient is motivated and is able to tolerate up to 3 hours of daily therapy for 5-6 days a week for a total of 15 hours a week. Evaluated by Physiatry and deemed to be an excellent candidate for admission to  for acute inpatient rehab. Admitted to Acute Rehab on 3/27/25.    Pt seen and examined by Physiatry and is currently functioning at [facility] bed mobility, modA transfers, 20’ RW modA, UBD standy-by, LBD modA.    LS: lives with family in  with [employer] and [employer]  PLOF: independent in ADLs and ambulate with no assistive device

## 2025-03-28 NOTE — PROGRESS NOTE ADULT - ASSESSMENT
69 yo F with pmhx of dementia, bilateral cataracts presenting for Left intertrochanteric hip fracture s/p mechanical fall     #Left intertrochanteric hip fracture  #Mechanical fall  -XR pelvis: Minimally displaced intertrochanteric hip fracture  -CT AP and LLE: Acute, impacted left femoral intertrochanteric fracture.  -Pt ambulates without assistance at baseline   -s/p morphine 4mg IV x2  -Tylenol 650 mg PO Q6H PRN for mild pain  -Tramadol 50 mg PO Q6H PRN for moderate pain  -Oxycodone 5 mg PO Q6H PRN for severe pain  -s/p ORIF  -Pain control   -WBAT  -Incentive spirometery   - HD stable  -PT/OT/Physiatry - 4a inpatient rehab  - Started on bowel regiment, miralax q12h and senna at bedtime, still no bowel movement    #Microcytic anemia  -Hgb 11.1 (no baseline), MCV 73.5  -FHx of anemia  -Iron studies WNL  -Keep Hgb >7.5, active type and screen  -f/u daily cbc, for hgb  -3/28, Hgb 8.3 <- 8     #Dementia  -Memory decline  -A&Ox3 at baseline and on my assessment at bedside  Plan  -C/w donepezil     DVT PPX: Lovenox, on hold  GI PPX: Protonix  DIET: NPO for now  ACTIVITY: NWB of LLE  DISPOSITION: pending placement in  following stable hgb and orthostatics   69 yo F with pmhx of dementia, bilateral cataracts presenting for Left intertrochanteric hip fracture s/p mechanical fall     #Left intertrochanteric hip fracture  #Mechanical fall  -XR pelvis: Minimally displaced intertrochanteric hip fracture  -CT AP and LLE: Acute, impacted left femoral intertrochanteric fracture.  -Pt ambulates without assistance at baseline   -s/p morphine 4mg IV x2  -Tylenol 650 mg PO Q6H PRN for mild pain  -Tramadol 50 mg PO Q6H PRN for moderate pain  -Oxycodone 5 mg PO Q6H PRN for severe pain  -s/p ORIF  -Pain control   -WBAT  -Incentive spirometery   - HD stable  -PT/OT/Physiatry - 4a inpatient rehab  - Started on bowel regiment, miralax q12h and senna at bedtime, still no bowel movement    #Microcytic anemia  -Hgb 11.1 (no baseline), MCV 73.5  -FHx of anemia  -Iron studies WNL  -Keep Hgb >7.5, active type and screen  -f/u daily cbc, for hgb  -3/28, Hgb 8.3 <- 8     #Dementia  -Memory decline  -A&Ox3 at baseline and on my assessment at bedside  Plan  -C/w donepezil     DVT PPX: Lovenox  GI PPX: Protonix  DIET: Regular Diet   ACTIVITY: AAT  DISPOSITION: pending placement in  following stable hgb and orthostatics   69 yo F with pmhx of dementia, bilateral cataracts presenting for Left intertrochanteric hip fracture s/p mechanical fall     #Left intertrochanteric hip fracture  #Mechanical fall  #Orthostatic Hypotension (resolved)  -XR pelvis: Minimally displaced intertrochanteric hip fracture  -CT AP and LLE: Acute, impacted left femoral intertrochanteric fracture.  -Pt ambulates without assistance at baseline   -s/p morphine 4mg IV x2  -Tylenol 650 mg PO Q6H PRN for mild pain  -Tramadol 50 mg PO Q6H PRN for moderate pain  -Oxycodone 5 mg PO Q6H PRN for severe pain  -s/p ORIF  -Pain control   -WBAT  -Incentive spirometery   - HD stable  -PT/OT/Physiatry - 4a inpatient rehab  - Started on bowel regiment, miralax q12h and senna at bedtime, still no bowel movement  -Given one L bolus 3/27, will continue with fluid hydration to avoid any hypotension    #Microcytic anemia  -Hgb 11.1 (no baseline), MCV 73.5  -FHx of anemia  -Iron studies WNL  -Keep Hgb >7.5, active type and screen  -f/u daily cbc, for hgb  -3/28, Hgb 8.3 <- 8     #Dementia  -Memory decline  -A&Ox3 at baseline and on my assessment at bedside  Plan  -C/w donepezil     DVT PPX: Lovenox  GI PPX: Protonix  DIET: Regular Diet   ACTIVITY: AAT  DISPOSITION: pending placement in  following stable hgb and orthostatics

## 2025-03-28 NOTE — DISCHARGE NOTE NURSING/CASE MANAGEMENT/SOCIAL WORK - PATIENT PORTAL LINK FT
You can access the FollowMyHealth Patient Portal offered by [facility] by registering at the following website: [email or web address]. By joining [facility]’s FollowMyHealth portal, you will also be able to view your health information using other applications (apps) compatible with our system.

## 2025-03-28 NOTE — H&P ADULT - NSHPREVIEWOFSYSTEMS_GEN_ALL_CORE
Constiutional:    [ x  ] WNL           [   ] poor appetite   [   ] insomnia   [   ] tired   Cardio:                [ x  ] WNL           [   ] CP   [   ] TOVAR   [   ] palpitations               Resp:                   [x   ] WNL           [   ] SOB   [   ] cough   [   ] wheezing   GI:                        [   ] WNL           [ x  ] constipation   [   ] diarrhea   [   ] abdominal pain   [   ] nausea   [   ] emesis                                :                      [  x ] WNL           [   ] ELIZALDE  [   ] dysuria   [   ] difficulty voiding             Endo:                   [ x  ] WNL          [   ] polyuria   [   ] temperature intolerance                 Skin:                     [   ] WNL          [   ] pain   [   ] wound   [   ] rash [x] L hip brusing   MSK:                    [   ] WNL          [ x  ] muscle pain   [   ] joint pain/ stiffness   [  x ] muscle tenderness   [   ] swelling   Neuro:                 [   x] WNL          [   ] HA   [   ] change in vision   [   ] tremor   [   ] weakness   [   ]dysphagia              Cognitive:           [ x  ] WNL           [   ]confusion      Psych:                  [  ] WNL           [   ] hallucinations   [   ]agitation   [   ] delusion   [ x  ]depression Constitutional:    [ x  ] WNL           [   ] poor appetite   [   ] insomnia   [   ] tired   Cardio:                [ x  ] WNL           [   ] CP   [   ] TOVAR   [   ] palpitations               Resp:                   [x   ] WNL           [   ] SOB   [   ] cough   [   ] wheezing   GI:                        [   ] WNL           [ x  ] constipation   [   ] diarrhea   [   ] abdominal pain   [   ] nausea   [   ] emesis                                :                      [  x ] WNL           [   ] ELIZALDE  [   ] dysuria   [   ] difficulty voiding             Endo:                   [ x  ] WNL          [   ] polyuria   [   ] temperature intolerance                 Skin:                     [   ] WNL          [   ] pain   [   ] wound   [   ] rash [x] L hip bruising   MSK:                    [   ] WNL          [ x  ] muscle pain   [   ] joint pain/ stiffness   [  x ] muscle tenderness   [   ] swelling   Neuro:                 [   x] WNL          [   ] HA   [   ] change in vision   [   ] tremor   [   ] weakness   [   ]dysphagia              Cognitive:           [ x  ] WNL           [   ]confusion      Psych:                  [  ] WNL           [   ] hallucinations   [   ]agitation   [   ] delusion   [ x  ]anxiety/depression

## 2025-03-28 NOTE — H&P ADULT - ATTENDING COMMENTS
Patient seen and examined with the resident. We discussed the case. I have directed the care. I edited the note. The patient requires acute rehab with 3 hours of daily therapies at least 5 out of 7 days and close physiatry follow up.  She is a 71 yo F with pmhx of mild dementia, anxiety, bilateral cataracts presenting to the ED 3/24/25 after a fall. She slipped on her socks while descending the final 3 steps on the stairs and fell on her left side. No head trauma or LOC. In ED CT LLE showed acute, impacted left femoral intertrochanteric fracture. Orthopedics saw her and she is s/p L hip ORIF with gamma nail on 3/24. Post op course complicated by anemia and orthostatic hypotension.  She received IVF.   Prior to admission, the patient was independent in ADLs and ambulation without an assistive device. Patient now requires assistance with ambulation and ADLs 2/2 to L Hip ORIF. There is a significant functional decline from baseline. To return home it is in the patient’s best interest to have acute inpatient rehabilitative services to undergo intensive interdisciplinary therapy. Furthermore, the patient is motivated and is able to tolerate up to 3 hours of daily therapy for 5-6 days a week for a total of 15 hours a week. Evaluated by Physiatry and deemed to be an excellent candidate for admission to  for acute inpatient rehab. Admitted to Acute Rehab on 3/27/25.  Pt seen and examined by Physiatry and is currently functioning at maxA bed mobility, mod transfers, 20’ RW modA, UBD standy-by, LBD modA.  LS: lives with family in  with 5STE and 1FOS Inside  PLOF: independent in ADLs and ambulate with no assistive device  She requires the intensity of acute rehab. She is motivated to improve and return home to her family--son and dtr in law. She requires close physiatry monitoring with her recent orthostatic hypotension, anemia and anxiety. Improved pain control will be helpful I'll add celebrex 100 mg po daily q8 am.    I have encouraged to eat and drink given her orthostatic hypotension and fracture to allow her to progress. She may not have drank so much on account of no wanting to urinate and Ramadan. I'll order a 500 cc fluid normal saline bolus on acute rehab on 3/28. Patient seen and examined with the resident. We discussed the case. I have directed the care. I edited the note. The patient requires acute rehab with 3 hours of daily therapies at least 5 out of 7 days and close physiatry follow up.  She is a 71 yo F with pmhx of mild dementia, anxiety, bilateral cataracts presenting to the ED 3/24/25 after a fall. She slipped on her socks while descending the final 3 steps on the stairs and fell on her left side. No head trauma or LOC. In ED CT LLE showed acute, impacted left femoral intertrochanteric fracture. Orthopedics saw her and she is s/p L hip ORIF with gamma nail on 3/24. Post op course complicated by anemia and orthostatic hypotension.  She received IVF.   Prior to admission, the patient was independent in ADLs and ambulation without an assistive device. Patient now requires assistance with ambulation and ADLs 2/2 to L Hip ORIF. There is a significant functional decline from baseline. To return home it is in the patient’s best interest to have acute inpatient rehabilitative services to undergo intensive interdisciplinary therapy. Furthermore, the patient is motivated and is able to tolerate up to 3 hours of daily therapy for 5-6 days a week for a total of 15 hours a week. Evaluated by Physiatry and deemed to be an excellent candidate for admission to  for acute inpatient rehab. Admitted to Acute Rehab on 3/27/25.  Pt seen and examined by Physiatry and is currently functioning at maxA bed mobility, modA transfers, 20’ RW modA, UBD standy-by, LBD modA.  LS: lives with family in  with 5STE and 1FOS Inside  PLOF: independent in ADLs and ambulate with no assistive device  She requires the intensity of acute rehab. She is motivated to improve and return home to her family--son and dtr in law. She requires close physiatry monitoring with her recent orthostatic hypotension, anemia and anxiety. Improved pain control will be helpful I'll add celebrex 100 mg po daily q8 am.    I have encouraged to eat and drink given her orthostatic hypotension and fracture to allow her to progress. She may not have drank so much on account of not wanting to urinate and it being Ramadan. I'll order a 500 cc fluid normal saline bolus on acute rehab on 3/28. I instructed her to drink fluids and eat to facilitate her progress in therapies.

## 2025-03-28 NOTE — H&P ADULT - NSHPPHYSICALEXAM_GEN_ALL_CORE
Vital Signs Last 24 Hrs  T(C): 37.1 (28 Mar 2025 07:23), Max: 37.1 (27 Mar 2025 23:10)  T(F): 98.7 (28 Mar 2025 07:23), Max: 98.7 (27 Mar 2025 23:10)  HR: 77 (28 Mar 2025 07:23) (77 - 98)  BP: 103/61 (28 Mar 2025 07:23) (103/61 - 133/68)  BP(mean): 86 (27 Mar 2025 23:10) (86 - 86)  RR: 20 (28 Mar 2025 07:23) (17 - 20)  SpO2: 95% (28 Mar 2025 07:23) (95% - 98%)    General:[x   ] NAD, Resting Comfortable,   [   ] other:                                HEENT: [x   ] NC/AT, EOMI, PERRL , Normal Conjunctivae,   [   ] other:  Cardio: [  x ] RRR, no murmer,   [   ] other:                              Pulm: [ x  ] No Respiratory Distress,  Lungs CTAB,   [   ] other:                       Abdomen: [x   ]ND/NT, Soft,   [   ] other:    : [ x  ] NO ELIZALDE CATHETER, [   ] ELIZALDE CATHETER- no meatal tear, no discharge, [   ] other:                                            MSK: [   ] No joint swelling, Full ROM,   [  x ] other: L thigh/hip swelling, L hip TTP, L hip ecchymosis                                         Ext: [ x  ]No C/C/E, No calf tenderness,   [   ]other:    Skin: [   ]intact,   [  x ] other: surgical dressing c/d/i                                                                   Neurological Examination:  Cognitive: [    ] AAO x 3,   [x    ]  other: not oriented to year                                                                     Attention:  [ x   ] intact,   [    ]  other:                            Mood/Affect: [   x ] wnl,    [    ]  other:                                                                             CN II - XII:  [  x  ] intact,  [    ] other:                                                                                        Motor:   RIGHT UE: [ x  ] WNL,  [   ] other:  LEFT    UE: [ x  ] WNL,  [   ] other:  RIGHT LE: [ x  ] WNL,  [   ] other:   LEFT    LE: [   ] WNL,  [  x ] other: HF 1+/5, KE/KF 3-/5, PF/DF 5/5 limited 2/2 pain    DTRs: [ x  ]symmetric, [   ] other:                                                                    Sensory: [  x  ] Intact to light touch,   [    ] other: Vital Signs Last 24 Hrs  T(C): 37.1 (28 Mar 2025 07:23), Max: 37.1 (27 Mar 2025 23:10)  T(F): 98.7 (28 Mar 2025 07:23), Max: 98.7 (27 Mar 2025 23:10)  HR: 77 (28 Mar 2025 07:23) (77 - 98)  BP: 103/61 (28 Mar 2025 07:23) (103/61 - 133/68)  BP(mean): 86 (27 Mar 2025 23:10) (86 - 86)  RR: 20 (28 Mar 2025 07:23) (17 - 20)  SpO2: 95% (28 Mar 2025 07:23) (95% - 98%)    General:[x   ] NAD, Resting Comfortable,   [   ] other:                                HEENT: [x   ] NC/AT, EOMI, PERRL , Normal Conjunctivae,   [   ] other:  Cardio: [  x ] RRR, no murmur,   [   ] other:                              Pulm: [ x  ] No Respiratory Distress,  Lungs CTAB,   [   ] other:                       Abdomen: [x   ]ND/NT, Soft,   [   ] other:    : [ x  ] NO ELIZALDE CATHETER, [   ] ELIZALDE CATHETER- no meatal tear, no discharge, [   ] other:                                            MSK: [   ] No joint swelling, Full ROM,   [  x ] other: L thigh/hip swelling, L hip TTP, L hip ecchymosis                                         Ext: [ x  ]No C/C/E, No calf tenderness,   [   ]other:    Skin: [   ]intact,   [  x ] other: surgical dressing c/d/i                                                                   Neurological Examination:  Cognitive: [    ] AAO x 3,   [x    ]  other: not oriented to year                                                                     Attention:  [ x   ] intact,   [    ]  other:                            Mood/Affect: [   x ] wnl,    [    ]  other:                                                                             CN II - XII:  [  x  ] intact,  [    ] other:                                                                                        Motor:   RIGHT UE: [ x  ] WNL,  [   ] other:  LEFT    UE: [ x  ] WNL,  [   ] other:  RIGHT LE: [ x  ] WNL,  [   ] other:   LEFT    LE: [   ] WNL,  [  x ] other: HF 1+/5, KE/KF 3-/5, PF/DF 5/5 limited 2/2 pain    DTRs: [ x  ]symmetric, [   ] other:                                                                    Sensory: [  x  ] Intact to light touch,   [    ] other: Vital Signs Last 24 Hrs  T(C): 37.1 (28 Mar 2025 07:23), Max: 37.1 (27 Mar 2025 23:10)  T(F): 98.7 (28 Mar 2025 07:23), Max: 98.7 (27 Mar 2025 23:10)  HR: 77 (28 Mar 2025 07:23) (77 - 98)  BP: 103/61 (28 Mar 2025 07:23) (103/61 - 133/68)  BP(mean): 86 (27 Mar 2025 23:10) (86 - 86)  RR: 20 (28 Mar 2025 07:23) (17 - 20)  SpO2: 95% (28 Mar 2025 07:23) (95% - 98%)    General:[x   ] NAD, Resting Comfortable,   [   ] other:                                HEENT: [x   ] NC/AT, EOMI, PERRL , Normal Conjunctivae,   [   ] other:  Cardio: [  x ] RRR, no murmur,   [   ] other:                              Pulm: [ x  ] No Respiratory Distress,  Lungs CTAB,   [   ] other:                       Abdomen: [x   ]ND/NT, Soft,   [   ] other:    : [ x  ] NO ELIZALDE CATHETER, [   ] ELIZALDE CATHETER- no meatal tear, no discharge, [   ] other:                                            MSK: [   ] No joint swelling, Full ROM,   [  x ] other: L thigh/hip swelling, L hip TTP, L hip ecchymosis                                         Ext: [ x  ]No C/C/E, No calf tenderness,   [   ]other:    Skin: [   ]intact,   [  x ] other: surgical dressing c/d/i                                                                   Neurological Examination:  Cognitive: [    ] AAO x 3,   [x    ]  other: not oriented to year, she knows the year. She is  pleasant and able to give her history. She follows simple commands  well.                                                                Attention:  [ x   ] intact,   [    ]  other:                            Mood/Affect: [   x ] wnl,    [    ]  other:                                                                             CN II - XII:  [  x  ] intact,  [    ] other:                                                                                        Motor:   RIGHT UE: [ x  ] WNL,  [   ] other:  LEFT    UE: [ x  ] WNL,  [   ] other:  RIGHT LE: [ x  ] WNL,  [   ] other:   LEFT    LE: [   ] WNL,  [  x ] other: HF 1+/5, KE/KF 3-/5, PF/DF 5/5 limited 2/2 pain    DTRs: [ x  ]symmetric, [   ] other:                                                                    Sensory: [  x  ] Intact to light touch,   [    ] other:

## 2025-03-28 NOTE — DISCHARGE NOTE PROVIDER - NSDCMRMEDTOKEN_GEN_ALL_CORE_FT
acetaminophen 325 mg oral tablet: 2 tab(s) orally every 6 hours As needed Mild Pain (1 - 3)  donepezil 10 mg oral tablet: 1 tab(s) orally once a day  enoxaparin: 40 milligram(s) subcutaneous once a day dvt prophylaxis for 5 more weeks post op  escitalopram 5 mg oral tablet: 1 tab(s) orally once a day  multivitamin: 1 cap(s) orally once a day  oxycodone-acetaminophen 5 mg-325 mg oral tablet: 1 tab(s) orally every 6 hours As needed Moderate Pain (4 - 6)  polyethylene glycol 3350 oral powder for reconstitution: 17 gram(s) orally 2 times a day  senna leaf extract oral tablet: 2 tab(s) orally once a day (at bedtime)

## 2025-03-29 LAB
24R-OH-CALCIDIOL SERPL-MCNC: 27 NG/ML — SIGNIFICANT CHANGE UP
ALBUMIN SERPL ELPH-MCNC: 3.5 G/DL — SIGNIFICANT CHANGE UP (ref 3.5–5.2)
ALP SERPL-CCNC: 86 U/L — SIGNIFICANT CHANGE UP (ref 30–115)
ALT FLD-CCNC: 56 U/L — HIGH (ref 0–41)
ANION GAP SERPL CALC-SCNC: 10 MMOL/L — SIGNIFICANT CHANGE UP (ref 7–14)
AST SERPL-CCNC: 53 U/L — HIGH (ref 0–41)
BASOPHILS # BLD AUTO: 0.05 K/UL — SIGNIFICANT CHANGE UP (ref 0–0.2)
BASOPHILS NFR BLD AUTO: 0.8 % — SIGNIFICANT CHANGE UP (ref 0–1)
BILIRUB SERPL-MCNC: 0.4 MG/DL — SIGNIFICANT CHANGE UP (ref 0.2–1.2)
BUN SERPL-MCNC: 15 MG/DL — SIGNIFICANT CHANGE UP (ref 10–20)
CALCIUM SERPL-MCNC: 8.9 MG/DL — SIGNIFICANT CHANGE UP (ref 8.4–10.5)
CHLORIDE SERPL-SCNC: 101 MMOL/L — SIGNIFICANT CHANGE UP (ref 98–110)
CO2 SERPL-SCNC: 28 MMOL/L — SIGNIFICANT CHANGE UP (ref 17–32)
CREAT SERPL-MCNC: 0.7 MG/DL — SIGNIFICANT CHANGE UP (ref 0.7–1.5)
EGFR: 92 ML/MIN/1.73M2 — SIGNIFICANT CHANGE UP
EGFR: 92 ML/MIN/1.73M2 — SIGNIFICANT CHANGE UP
EOSINOPHIL # BLD AUTO: 0.37 K/UL — SIGNIFICANT CHANGE UP (ref 0–0.7)
EOSINOPHIL NFR BLD AUTO: 5.6 % — SIGNIFICANT CHANGE UP (ref 0–8)
GLUCOSE SERPL-MCNC: 109 MG/DL — HIGH (ref 70–99)
HCT VFR BLD CALC: 27.3 % — LOW (ref 37–47)
HGB BLD-MCNC: 8.6 G/DL — LOW (ref 12–16)
IMM GRANULOCYTES NFR BLD AUTO: 0.6 % — HIGH (ref 0.1–0.3)
LYMPHOCYTES # BLD AUTO: 2.36 K/UL — SIGNIFICANT CHANGE UP (ref 1.2–3.4)
LYMPHOCYTES # BLD AUTO: 35.9 % — SIGNIFICANT CHANGE UP (ref 20.5–51.1)
MAGNESIUM SERPL-MCNC: 2 MG/DL — SIGNIFICANT CHANGE UP (ref 1.8–2.4)
MCHC RBC-ENTMCNC: 22.8 PG — LOW (ref 27–31)
MCHC RBC-ENTMCNC: 31.5 G/DL — LOW (ref 32–37)
MCV RBC AUTO: 72.2 FL — LOW (ref 81–99)
MONOCYTES # BLD AUTO: 0.69 K/UL — HIGH (ref 0.1–0.6)
MONOCYTES NFR BLD AUTO: 10.5 % — HIGH (ref 1.7–9.3)
NEUTROPHILS # BLD AUTO: 3.06 K/UL — SIGNIFICANT CHANGE UP (ref 1.4–6.5)
NEUTROPHILS NFR BLD AUTO: 46.6 % — SIGNIFICANT CHANGE UP (ref 42.2–75.2)
NRBC BLD AUTO-RTO: 0 /100 WBCS — SIGNIFICANT CHANGE UP (ref 0–0)
PLATELET # BLD AUTO: 247 K/UL — SIGNIFICANT CHANGE UP (ref 130–400)
PMV BLD: 10.7 FL — HIGH (ref 7.4–10.4)
POTASSIUM SERPL-MCNC: 4.6 MMOL/L — SIGNIFICANT CHANGE UP (ref 3.5–5)
POTASSIUM SERPL-SCNC: 4.6 MMOL/L — SIGNIFICANT CHANGE UP (ref 3.5–5)
PROT SERPL-MCNC: 5.6 G/DL — LOW (ref 6–8)
RBC # BLD: 3.78 M/UL — LOW (ref 4.2–5.4)
RBC # FLD: 16 % — HIGH (ref 11.5–14.5)
SODIUM SERPL-SCNC: 139 MMOL/L — SIGNIFICANT CHANGE UP (ref 135–146)
WBC # BLD: 6.57 K/UL — SIGNIFICANT CHANGE UP (ref 4.8–10.8)
WBC # FLD AUTO: 6.57 K/UL — SIGNIFICANT CHANGE UP (ref 4.8–10.8)

## 2025-03-29 RX ORDER — POLYETHYLENE GLYCOL 3350 17 G/17G
17 POWDER, FOR SOLUTION ORAL AT BEDTIME
Refills: 0 | Status: DISCONTINUED | OUTPATIENT
Start: 2025-03-30 | End: 2025-04-14

## 2025-03-29 RX ORDER — OXYCODONE HYDROCHLORIDE 30 MG/1
5 TABLET ORAL
Refills: 0 | Status: DISCONTINUED | OUTPATIENT
Start: 2025-03-29 | End: 2025-04-01

## 2025-03-29 RX ORDER — LIDOCAINE HYDROCHLORIDE 20 MG/ML
1 JELLY TOPICAL DAILY
Refills: 0 | Status: DISCONTINUED | OUTPATIENT
Start: 2025-03-29 | End: 2025-04-14

## 2025-03-29 RX ADMIN — OXYCODONE HYDROCHLORIDE 5 MILLIGRAM(S): 30 TABLET ORAL at 15:32

## 2025-03-29 RX ADMIN — Medication 1 TABLET(S): at 12:25

## 2025-03-29 RX ADMIN — Medication 975 MILLIGRAM(S): at 14:18

## 2025-03-29 RX ADMIN — Medication 975 MILLIGRAM(S): at 21:21

## 2025-03-29 RX ADMIN — POLYETHYLENE GLYCOL 3350 17 GRAM(S): 17 POWDER, FOR SOLUTION ORAL at 06:43

## 2025-03-29 RX ADMIN — Medication 975 MILLIGRAM(S): at 06:44

## 2025-03-29 RX ADMIN — Medication 975 MILLIGRAM(S): at 13:18

## 2025-03-29 RX ADMIN — Medication 2 TABLET(S): at 21:21

## 2025-03-29 RX ADMIN — CELECOXIB 100 MILLIGRAM(S): 50 CAPSULE ORAL at 09:25

## 2025-03-29 RX ADMIN — ENOXAPARIN SODIUM 40 MILLIGRAM(S): 100 INJECTION SUBCUTANEOUS at 21:22

## 2025-03-29 RX ADMIN — DONEPEZIL HYDROCHLORIDE 10 MILLIGRAM(S): 5 TABLET ORAL at 21:21

## 2025-03-29 RX ADMIN — OXYCODONE HYDROCHLORIDE 5 MILLIGRAM(S): 30 TABLET ORAL at 14:21

## 2025-03-29 RX ADMIN — ESCITALOPRAM OXALATE 5 MILLIGRAM(S): 20 TABLET ORAL at 12:24

## 2025-03-29 RX ADMIN — Medication 500 MILLILITER(S): at 17:45

## 2025-03-29 RX ADMIN — Medication 2 MILLIGRAM(S): at 08:25

## 2025-03-29 RX ADMIN — Medication 975 MILLIGRAM(S): at 21:51

## 2025-03-29 RX ADMIN — Medication 2 MILLIGRAM(S): at 09:25

## 2025-03-29 RX ADMIN — CELECOXIB 100 MILLIGRAM(S): 50 CAPSULE ORAL at 08:25

## 2025-03-29 NOTE — PROGRESS NOTE ADULT - SUBJECTIVE AND OBJECTIVE BOX
Patient is a 70y old  Female who presents with a chief complaint of Rehab of gait abnormality and decline in function 2/2 L hip IT fx s/p ORIF 3/26 (28 Mar 2025 14:30)      HPI:  71 yo F with PMH of mild dementia, anxiety, bilateral cataracts presented to the ED 3/24/25 s/p fall. Son stated that the pt was walking down stairs and slipped on her socks while descending the final 3 steps and fell on her left side. No head trauma or LOC. In ED CT LLE showed acute, impacted left femoral intertrochanteric fracture. Patient was evaluated by orthopedics and is s/p L hip ORIF 3/24. Post op course complicated by post op anemia and orthostatic hypotension s/p IVF, will continue to monitor in acute rehab.     Imaging:   XR Left Femur: Comminuted trochanteric fracture.  CTH negative  CT Cspine negative  CTAP: Acute, impacted left femoral intertrochanteric fracture. Sacral Tarlov cysts.  CT LLE: Acute, impacted left femoral intertrochanteric fracture.Soft tissues unremarkable. No hip dislocation. Degenerative change of left hip and left knee.      Prior to admission, the patient was independent in ADLs and ambulation without an assistive device. Patient now requires assistance with ambulation and ADLs 2/2 to L Hip ORIF. There is a significant functional decline from baseline. To return home it is in the patient’s best interest to have acute inpatient rehabilitative services to undergo intensive interdisciplinary therapy. Furthermore, the patient is motivated and is able to tolerate up to 3 hours of daily therapy for 5-6 days a week for a total of 15 hours a week. Evaluated by Physiatry and deemed to be an excellent candidate for admission to  for acute inpatient rehab. Admitted to Acute Rehab on 3/27/25.    Pt seen and examined by Physiatry and is currently functioning at [facility] bed mobility, modA transfers, 20’ RW modA, UBD standy-by, LBD modA.    LS: lives with family in  with [employer] and [employer]  PLOF: independent in ADLs and ambulate with no assistive device     (28 Mar 2025 14:30)      I examined the patient and reviewed the chart. There have been no significant changes since my history and physical except where documented below.    TODAY'S SUBJECTIVE & REVIEW OF SYMPTOMS  Patient seen at bedside,  [id number]. Notes continued L hip post op pain, will monitor. Patient also notes sternal and rib pain, TTP. Will add lidocaine patch and monitor    Review of Systems: Constitutional:    [ x  ] WNL           [   ] poor appetite   [   ] insomnia   [   ] tired   Cardio:                [ x  ] WNL           [   ] CP   [   ] TOVAR   [   ] palpitations               Resp:                   [x   ] WNL           [   ] SOB   [   ] cough   [   ] wheezing   GI:                        [   ] WNL           [ x  ] constipation   [   ] diarrhea   [   ] abdominal pain   [   ] nausea   [   ] emesis                                :                      [  x ] WNL           [   ] ELIZALDE  [   ] dysuria   [   ] difficulty voiding             Endo:                   [ x  ] WNL          [   ] polyuria   [   ] temperature intolerance                 Skin:                     [   ] WNL          [   ] pain   [   ] wound   [   ] rash [x] L hip bruising   MSK:                    [   ] WNL          [ x  ] muscle pain   [ x  ] sternal and L rib joint pain [  x ] muscle tenderness   [   ] swelling   Neuro:                 [   x] WNL          [   ] HA   [   ] change in vision   [   ] tremor   [   ] weakness   [   ]dysphagia              Cognitive:           [ x  ] WNL           [   ]confusion      Psych:                  [  ] WNL           [   ] hallucinations   [   ]agitation   [   ] delusion   [ x  ]anxiety/depression      PHYSICAL EXAM    Vital Signs Last 24 Hrs  T(C): 36.4 (29 Mar 2025 06:02), Max: 37 (28 Mar 2025 21:29)  T(F): 97.5 (29 Mar 2025 06:02), Max: 98.6 (28 Mar 2025 21:29)  HR: 73 (29 Mar 2025 06:02) (73 - 81)  BP: 106/64 (29 Mar 2025 06:02) (104/- - 111/64)  BP(mean): --  RR: 18 (29 Mar 2025 06:02) (18 - 18)  SpO2: 100% (28 Mar 2025 15:47) (100% - 100%)      General:[x   ] NAD, Resting Comfortable,   [   ] other:                                HEENT: [x   ] NC/AT, EOMI, PERRL , Normal Conjunctivae,   [   ] other:  Cardio: [  x ] RRR, no murmur,   [   ] other:                              Pulm: [ x  ] No Respiratory Distress,  Lungs CTAB,   [   ] other:                       Abdomen: [x   ]ND/NT, Soft,   [   ] other:    : [ x  ] NO ELIZALDE CATHETER, [   ] ELIZALDE CATHETER- no meatal tear, no discharge, [   ] other:                                            MSK: [   ] No joint swelling, Full ROM,   [  x ] other: L thigh/hip swelling, L hip TTP, L hip ecchymosis                                         Ext: [ x  ]No C/C/E, No calf tenderness,   [   ]other:    Skin: [   ]intact,   [  x ] other: surgical dressing c/d/i                                                                   Neurological Examination:  Cognitive: [    ] AAO x 3,   [x    ]  other: not oriented to year, she knows the year. She is  pleasant and able to give her history. She follows simple commands  well.                                                                Attention:  [ x   ] intact,   [    ]  other:                            Mood/Affect: [   x ] wnl,    [    ]  other:                                                                             CN II - XII:  [  x  ] intact,  [    ] other:                                                                                        Motor:   RIGHT UE: [ x  ] WNL,  [   ] other:  LEFT    UE: [ x  ] WNL,  [   ] other:  RIGHT LE: [ x  ] WNL,  [   ] other:   LEFT    LE: [   ] WNL,  [  x ] other: HF 1+/5, KE/KF 3-/5, PF/DF 5/5 limited 2/2 pain    DTRs: [ x  ]symmetric, [   ] other:                                                                    Sensory: [  x  ] Intact to light touch,   [    ] other:    MEDICATIONS  (STANDING):  acetaminophen     Tablet .. 975 milliGRAM(s) Oral every 8 hours  celecoxib 100 milliGRAM(s) Oral <User Schedule>  donepezil 10 milliGRAM(s) Oral at bedtime  enoxaparin Injectable 40 milliGRAM(s) SubCutaneous every 24 hours  escitalopram 5 milliGRAM(s) Oral daily  lidocaine   4% Patch 1 Patch Transdermal daily  multivitamin 1 Tablet(s) Oral daily  polyethylene glycol 3350 17 Gram(s) Oral two times a day  senna 2 Tablet(s) Oral at bedtime    MEDICATIONS  (PRN):  melatonin 5 milliGRAM(s) Oral at bedtime PRN Insomnia  morphine  - Injectable 2 milliGRAM(s) IV Push every 6 hours PRN Severe Pain (7 - 10)  oxyCODONE    IR 5 milliGRAM(s) Oral every 4 hours PRN Moderate Pain (4 - 6)      RECENT LABS/IMAGING                        8.6    6.57  )-----------( 247      ( 29 Mar 2025 07:54 )             27.3     03-29    139  |  101  |  15  ----------------------------<  109[H]  4.6   |  28  |  0.7    Ca    8.9      29 Mar 2025 07:54  Mg     2.0     03-29    TPro  5.6[L]  /  Alb  3.5  /  TBili  0.4  /  DBili  x   /  AST  53[H]  /  ALT  56[H]  /  AlkPhos  86  03-29      Urinalysis Basic - ( 29 Mar 2025 07:54 )    Color: x / Appearance: x / SG: x / pH: x  Gluc: 109 mg/dL / Ketone: x  / Bili: x / Urobili: x   Blood: x / Protein: x / Nitrite: x   Leuk Esterase: x / RBC: x / WBC x   Sq Epi: x / Non Sq Epi: x / Bacteria: x     Patient is a 70y old  Female who presents with a chief complaint of Rehab of gait abnormality and decline in function 2/2 L hip IT fx s/p ORIF 3/26 (28 Mar 2025 14:30)      HPI:  69 yo F with PMH of mild dementia, anxiety, bilateral cataracts presented to the ED 3/24/25 s/p fall. Son stated that the pt was walking down stairs and slipped on her socks while descending the final 3 steps and fell on her left side. No head trauma or LOC. In ED CT LLE showed acute, impacted left femoral intertrochanteric fracture. Patient was evaluated by orthopedics and is s/p L hip ORIF 3/24. Post op course complicated by post op anemia and orthostatic hypotension s/p IVF, will continue to monitor in acute rehab.     Imaging:   XR Left Femur: Comminuted trochanteric fracture.  CTH negative  CT Cspine negative  CTAP: Acute, impacted left femoral intertrochanteric fracture. Sacral Tarlov cysts.  CT LLE: Acute, impacted left femoral intertrochanteric fracture.Soft tissues unremarkable. No hip dislocation. Degenerative change of left hip and left knee.      Prior to admission, the patient was independent in ADLs and ambulation without an assistive device. Patient now requires assistance with ambulation and ADLs 2/2 to L Hip ORIF. There is a significant functional decline from baseline. To return home it is in the patient’s best interest to have acute inpatient rehabilitative services to undergo intensive interdisciplinary therapy. Furthermore, the patient is motivated and is able to tolerate up to 3 hours of daily therapy for 5-6 days a week for a total of 15 hours a week. Evaluated by Physiatry and deemed to be an excellent candidate for admission to  for acute inpatient rehab. Admitted to Acute Rehab on 3/27/25.    Pt seen and examined by Physiatry and is currently functioning at [facility] bed mobility, modA transfers, 20’ RW modA, UBD standy-by, LBD modA.    LS: lives with family in  with [employer] and [employer]  PLOF: independent in ADLs and ambulate with no assistive device     (28 Mar 2025 14:30)      I examined the patient and reviewed the chart. There have been no significant changes since my history and physical except where documented below.    TODAY'S SUBJECTIVE & REVIEW OF SYMPTOMS  Patient seen at bedside,  [id number]. Notes continued L hip post op pain, will monitor. Patient also notes sternal and rib pain, TTP. Will add lidocaine patch and monitor  Patient anxious at bedside, will discuss with family about increasing escitalopram for improvement.    Review of Systems: Constitutional:    [ x  ] WNL           [   ] poor appetite   [   ] insomnia   [   ] tired   Cardio:                [ x  ] WNL           [   ] CP   [   ] TOVAR   [   ] palpitations               Resp:                   [x   ] WNL           [   ] SOB   [   ] cough   [   ] wheezing   GI:                        [   ] WNL           [ x  ] constipation   [   ] diarrhea   [   ] abdominal pain   [   ] nausea   [   ] emesis                                :                      [  x ] WNL           [   ] ELIZALDE  [   ] dysuria   [   ] difficulty voiding             Endo:                   [ x  ] WNL          [   ] polyuria   [   ] temperature intolerance                 Skin:                     [   ] WNL          [   ] pain   [   ] wound   [   ] rash [x] L hip bruising   MSK:                    [   ] WNL          [ x  ] muscle pain   [ x  ] sternal and L rib joint pain [  x ] muscle tenderness   [   ] swelling   Neuro:                 [   x] WNL          [   ] HA   [   ] change in vision   [   ] tremor   [   ] weakness   [   ]dysphagia              Cognitive:           [ x  ] WNL           [   ]confusion      Psych:                  [  ] WNL           [   ] hallucinations   [   ]agitation   [   ] delusion   [ x  ]anxiety/depression      PHYSICAL EXAM    Vital Signs Last 24 Hrs  T(C): 36.4 (29 Mar 2025 06:02), Max: 37 (28 Mar 2025 21:29)  T(F): 97.5 (29 Mar 2025 06:02), Max: 98.6 (28 Mar 2025 21:29)  HR: 73 (29 Mar 2025 06:02) (73 - 81)  BP: 106/64 (29 Mar 2025 06:02) (104/- - 111/64)  BP(mean): --  RR: 18 (29 Mar 2025 06:02) (18 - 18)  SpO2: 100% (28 Mar 2025 15:47) (100% - 100%)      General:[x   ] NAD, Resting Comfortable,   [   ] other:                                HEENT: [x   ] NC/AT, EOMI, PERRL , Normal Conjunctivae,   [   ] other:  Cardio: [  x ] RRR, no murmur,   [   ] other:                              Pulm: [ x  ] No Respiratory Distress,  Lungs CTAB,   [   ] other:                       Abdomen: [x   ]ND/NT, Soft,   [   ] other:    : [ x  ] NO ELIZALDE CATHETER, [   ] ELIZALDE CATHETER- no meatal tear, no discharge, [   ] other:                                            MSK: [   ] No joint swelling, Full ROM,   [  x ] other: L thigh/hip swelling, L hip TTP, L hip ecchymosis                                         Ext: [ x  ]No C/C/E, No calf tenderness,   [   ]other:    Skin: [   ]intact,   [  x ] other: surgical dressing c/d/i                                                                   Neurological Examination:  Cognitive: [    ] AAO x 3,   [x    ]  other: not oriented to year, she knows the year. She is  pleasant and able to give her history. She follows simple commands  well.                                                                Attention:  [ x   ] intact,   [    ]  other:                            Mood/Affect: [   x ] wnl,    [    ]  other:                                                                             CN II - XII:  [  x  ] intact,  [    ] other:                                                                                        Motor:   RIGHT UE: [ x  ] WNL,  [   ] other:  LEFT    UE: [ x  ] WNL,  [   ] other:  RIGHT LE: [ x  ] WNL,  [   ] other:   LEFT    LE: [   ] WNL,  [  x ] other: HF 1+/5, KE/KF 3-/5, PF/DF 5/5 limited 2/2 pain    DTRs: [ x  ]symmetric, [   ] other:                                                                    Sensory: [  x  ] Intact to light touch,   [    ] other:    MEDICATIONS  (STANDING):  acetaminophen     Tablet .. 975 milliGRAM(s) Oral every 8 hours  celecoxib 100 milliGRAM(s) Oral <User Schedule>  donepezil 10 milliGRAM(s) Oral at bedtime  enoxaparin Injectable 40 milliGRAM(s) SubCutaneous every 24 hours  escitalopram 5 milliGRAM(s) Oral daily  lidocaine   4% Patch 1 Patch Transdermal daily  multivitamin 1 Tablet(s) Oral daily  polyethylene glycol 3350 17 Gram(s) Oral two times a day  senna 2 Tablet(s) Oral at bedtime    MEDICATIONS  (PRN):  melatonin 5 milliGRAM(s) Oral at bedtime PRN Insomnia  morphine  - Injectable 2 milliGRAM(s) IV Push every 6 hours PRN Severe Pain (7 - 10)  oxyCODONE    IR 5 milliGRAM(s) Oral every 4 hours PRN Moderate Pain (4 - 6)      RECENT LABS/IMAGING                        8.6    6.57  )-----------( 247      ( 29 Mar 2025 07:54 )             27.3     03-29    139  |  101  |  15  ----------------------------<  109[H]  4.6   |  28  |  0.7    Ca    8.9      29 Mar 2025 07:54  Mg     2.0     03-29    TPro  5.6[L]  /  Alb  3.5  /  TBili  0.4  /  DBili  x   /  AST  53[H]  /  ALT  56[H]  /  AlkPhos  86  03-29      Urinalysis Basic - ( 29 Mar 2025 07:54 )    Color: x / Appearance: x / SG: x / pH: x  Gluc: 109 mg/dL / Ketone: x  / Bili: x / Urobili: x   Blood: x / Protein: x / Nitrite: x   Leuk Esterase: x / RBC: x / WBC x   Sq Epi: x / Non Sq Epi: x / Bacteria: x     Patient is a 70y old  Female who presents with a chief complaint of Rehab of gait abnormality and decline in function 2/2 L hip IT fx s/p ORIF 3/26 (28 Mar 2025 14:30)      HPI:  69 yo F with PMH of mild dementia, anxiety, bilateral cataracts presented to the ED 3/24/25 s/p fall. Son stated that the pt was walking down stairs and slipped on her socks while descending the final 3 steps and fell on her left side. No head trauma or LOC. In ED CT LLE showed acute, impacted left femoral intertrochanteric fracture. Patient was evaluated by orthopedics and is s/p L hip ORIF 3/24. Post op course complicated by post op anemia and orthostatic hypotension s/p IVF, will continue to monitor in acute rehab.     Imaging:   XR Left Femur: Comminuted trochanteric fracture.  CTH negative  CT Cspine negative  CTAP: Acute, impacted left femoral intertrochanteric fracture. Sacral Tarlov cysts.  CT LLE: Acute, impacted left femoral intertrochanteric fracture.Soft tissues unremarkable. No hip dislocation. Degenerative change of left hip and left knee.      Prior to admission, the patient was independent in ADLs and ambulation without an assistive device. Patient now requires assistance with ambulation and ADLs 2/2 to L Hip ORIF. There is a significant functional decline from baseline. To return home it is in the patient’s best interest to have acute inpatient rehabilitative services to undergo intensive interdisciplinary therapy. Furthermore, the patient is motivated and is able to tolerate up to 3 hours of daily therapy for 5-6 days a week for a total of 15 hours a week. Evaluated by Physiatry and deemed to be an excellent candidate for admission to  for acute inpatient rehab. Admitted to Acute Rehab on 3/27/25.    Pt seen and examined by Physiatry and is currently functioning at [facility] bed mobility, modA transfers, 20’ RW modA, UBD standy-by, LBD modA.    LS: lives with family in  with [employer] and [employer]  PLOF: independent in ADLs and ambulate with no assistive device     (28 Mar 2025 14:30)      I examined the patient and reviewed the chart. There have been no significant changes since my history and physical except where documented below.    TODAY'S SUBJECTIVE & REVIEW OF SYMPTOMS  Patient seen at bedside,  [id number]. Notes continued L hip post op pain, will monitor. Patient also notes sternal and rib pain, TTP. Will add lidocaine patch and monitor  Patient anxious at bedside, will discuss with family about increasing escitalopram for improvement. She requests halting the senna.     Review of Systems: Constitutional:    [ x  ] WNL           [   ] poor appetite   [   ] insomnia   [   ] tired   Cardio:                [ x  ] WNL           [   ] CP   [   ] TOVAR   [   ] palpitations               Resp:                   [x   ] WNL           [   ] SOB   [   ] cough   [   ] wheezing   GI:                        [ x  ] WNL           [   ] constipation   [   ] diarrhea   [   ] abdominal pain   [   ] nausea   [   ] emesis                                :                      [  x ] WNL           [   ] ELIZALDE  [   ] dysuria   [   ] difficulty voiding             Endo:                   [ x  ] WNL          [   ] polyuria   [   ] temperature intolerance                 Skin:                     [   ] WNL          [   ] pain   [   ] wound   [   ] rash [x] L hip bruising   MSK:                    [   ] WNL          [ x  ] muscle pain   [ x  ] sternal and L rib joint pain [  x ] muscle tenderness   [   ] swelling   Neuro:                 [   x] WNL          [   ] HA   [   ] change in vision   [   ] tremor   [   ] weakness   [   ]dysphagia              Cognitive:           [ x  ] WNL           [   ]confusion      Psych:                  [  ] WNL           [   ] hallucinations   [   ]agitation   [   ] delusion   [ x  ]anxiety/depression      PHYSICAL EXAM    Vital Signs Last 24 Hrs  T(C): 36.4 (29 Mar 2025 06:02), Max: 37 (28 Mar 2025 21:29)  T(F): 97.5 (29 Mar 2025 06:02), Max: 98.6 (28 Mar 2025 21:29)  HR: 73 (29 Mar 2025 06:02) (73 - 81)  BP: 106/64 (29 Mar 2025 06:02) (104/- - 111/64)  BP(mean): --  RR: 18 (29 Mar 2025 06:02) (18 - 18)  SpO2: 100% (28 Mar 2025 15:47) (100% - 100%)      General:[x   ] NAD, Resting Comfortable,   [   ] other:                                HEENT: [x   ] NC/AT, EOMI, PERRL , Normal Conjunctivae,   [   ] other:  Cardio: [  x ] RRR, no murmur,   [   ] other:                              Pulm: [ x  ] No Respiratory Distress,  Lungs CTAB,   [   ] other:                       Abdomen: [x   ]ND/NT, Soft,   [   ] other:    : [ x  ] NO ELIZALDE CATHETER, [   ] ELIZALDE CATHETER- no meatal tear, no discharge, [   ] other:                                            MSK: [   ] No joint swelling, Full ROM,   [  x ] other: L thigh/hip swelling, L hip TTP, L hip ecchymosis                                         Ext: [ x  ]No C/C/E, No calf tenderness,   [   ]other:    Skin: [   ]intact,   [  x ] other: surgical dressing c/d/i                                                                   Neurological Examination:  Cognitive: [    ] AAO x 3,   [x    ]  other: not oriented to year, she knows the year. She is  pleasant and able to give her history. She follows simple commands  well.                                                                Attention:  [ x   ] intact,   [    ]  other:                            Mood/Affect: [   x ] wnl,    [    ]  other:                                                                             CN II - XII:  [  x  ] intact,  [    ] other:                                                                                        Motor:   RIGHT UE: [ x  ] WNL,  [   ] other:  LEFT    UE: [ x  ] WNL,  [   ] other:  RIGHT LE: [ x  ] WNL,  [   ] other:   LEFT    LE: [   ] WNL,  [  x ] other: HF 1+/5, KE/KF 3-/5, PF/DF 5/5 limited 2/2 pain    DTRs: [ x  ]symmetric, [   ] other:                                                                    Sensory: [  x  ] Intact to light touch,   [    ] other:    MEDICATIONS  (STANDING):  acetaminophen     Tablet .. 975 milliGRAM(s) Oral every 8 hours  celecoxib 100 milliGRAM(s) Oral <User Schedule>  donepezil 10 milliGRAM(s) Oral at bedtime  enoxaparin Injectable 40 milliGRAM(s) SubCutaneous every 24 hours  escitalopram 5 milliGRAM(s) Oral daily  lidocaine   4% Patch 1 Patch Transdermal daily  multivitamin 1 Tablet(s) Oral daily  polyethylene glycol 3350 17 Gram(s) Oral two times a day  senna 2 Tablet(s) Oral at bedtime    MEDICATIONS  (PRN):  melatonin 5 milliGRAM(s) Oral at bedtime PRN Insomnia  morphine  - Injectable 2 milliGRAM(s) IV Push every 6 hours PRN Severe Pain (7 - 10)  oxyCODONE    IR 5 milliGRAM(s) Oral every 4 hours PRN Moderate Pain (4 - 6)      RECENT LABS/IMAGING                        8.6    6.57  )-----------( 247      ( 29 Mar 2025 07:54 )             27.3     03-29    139  |  101  |  15  ----------------------------<  109[H]  4.6   |  28  |  0.7    Ca    8.9      29 Mar 2025 07:54  Mg     2.0     03-29    TPro  5.6[L]  /  Alb  3.5  /  TBili  0.4  /  DBili  x   /  AST  53[H]  /  ALT  56[H]  /  AlkPhos  86  03-29      Urinalysis Basic - ( 29 Mar 2025 07:54 )    Color: x / Appearance: x / SG: x / pH: x  Gluc: 109 mg/dL / Ketone: x  / Bili: x / Urobili: x   Blood: x / Protein: x / Nitrite: x   Leuk Esterase: x / RBC: x / WBC x   Sq Epi: x / Non Sq Epi: x / Bacteria: x

## 2025-03-29 NOTE — CHART NOTE - NSCHARTNOTEFT_GEN_A_CORE
Case discuss with patient and daughter in law at bedside. Will hold off on increasing SSRI, patient anxious at baseline, inlaw will come visit more. Per PT, patient had SBP 97 in therapy, likely insetting of fasting due to ramadan. Will give 500 cc bolus and monitor

## 2025-03-29 NOTE — PROGRESS NOTE ADULT - ATTENDING COMMENTS
Patient seen and examined with the resident. We discussed the case. I have directed the care. I edited the note. The patient requires acute rehab with 3 hours of daily therapies at least 5 out of 7 days and close physiatry follow up.  #Rehab of gait abnormality and decline in function 2/2 L hip IT fx s/p ORIF 3/26  -CT LLE: Acute, impacted left femoral intertrochanteric fracture.  -c/w Tylenol 975mg q8h ATC, celebrex 100 mg q8AM, and oxycodone 5 mg  q4h prn for pain, oxycodone 5 mg po q8am   -WBAT on the LLE  -PT/OT Eval and treat: Patient requires 3 hrs daily of interdisciplinary inpatient rehab at least 5 days a week.     #Post op anemia, left thigh echymosis  -Hgb 11.9 on admission   -Hgb 8.4 3/27  -Monitor CBC, transfuse <7    #Orthostatic hypotension  Close monitoring of her vitals  500 cc NS fluid bolus     #MSK Sternal and rib pain, TTP  Lidocaine patch qd  Monitor    #Dementia, Mild  She benefit from and tolerate 3 hrs of daily intensive therapies.   -C/w home med donepezil 10mg qhs    #Anxiety/depression  -c/w home med escitalopram 5mg daily     -Pain control: Tylenol 975mg q8h, celebrex 100 mg daily at 8 am;  oxycodone 5 mg po q4h prn, oxycodone 5 mg po daily at 8 am    -GI/Bowel Mgmt: miralax     - Diet: Regular, Halal

## 2025-03-29 NOTE — PROGRESS NOTE ADULT - ASSESSMENT
ASSESSMENT/PLAN    69 yo F with pmhx of mild dementia, anxiety bilateral cataracts presenting for mechanism fall with Left intertrochanteric hip s/p ORIF 3/24. Postop coarse complicated by orthostatic hypotension postop anemia. Her hip fracture is quite painful.      #Rehab of gait abnormality and decline in function 2/2 L hip IT fx s/p ORIF 3/26  -CT LLE: Acute, impacted left femoral intertrochanteric fracture.  -c/w Tylenol 975mg q8h ATC, celebrex 100 mg q8AM, and oxycodone 5 mg  q6h prn for pain   -WBAT on the LLE  -PT/OT Eval and treat: Patient requires 3 hrs daily of interdisciplinary inpatient rehab at least 5 days a week.     #Post op anemia, left thigh echymosis  -Hgb 11.9 on admission   -Hgb 8.4 3/27  -Monitor CBC, transfuse <7    #Orthostatic hypotension  Close monitoring of her vitals  500 cc NS fluid bolus     #MSK Sternal and rib pain, TTP  Lidocaine patch qd  Monitor    #Dementia, Mild  She benefit from and tolerate 3 hrs of daily intensive therapies.   -C/w home med donepezil 10mg qhs    #Anxiety/depression  -c/w home med escitalopram 5mg daily     -Pain control: Tylenol 975mg q8h, celebrex 100 mg qAM,;  oxycodone 5 mg po q6h prn    -GI/Bowel Mgmt: senna/miralax     - Diet: Regular, Halal      Precautions / PROPHYLAXIS:      - Falls    - Ortho: Weight bearing status: WBAT LLE      - DVT prophylaxis: lovenox  ASSESSMENT/PLAN    71 yo F with pmhx of mild dementia, anxiety bilateral cataracts presenting for mechanism fall with Left intertrochanteric hip s/p ORIF 3/24. Postop coarse complicated by orthostatic hypotension postop anemia. Her hip fracture is quite painful.      #Rehab of gait abnormality and decline in function 2/2 L hip IT fx s/p ORIF 3/26  -CT LLE: Acute, impacted left femoral intertrochanteric fracture.  -c/w Tylenol 975mg q8h ATC, celebrex 100 mg q8AM, and oxycodone 5 mg  q4h prn for pain, oxycodone 5 mg po q8am   -WBAT on the LLE  -PT/OT Eval and treat: Patient requires 3 hrs daily of interdisciplinary inpatient rehab at least 5 days a week.     #Post op anemia, left thigh echymosis  -Hgb 11.9 on admission   -Hgb 8.4 3/27  -Monitor CBC, transfuse <7    #Orthostatic hypotension  Close monitoring of her vitals  500 cc NS fluid bolus     #MSK Sternal and rib pain, TTP  Lidocaine patch qd  Monitor    #Dementia, Mild  She benefit from and tolerate 3 hrs of daily intensive therapies.   -C/w home med donepezil 10mg qhs    #Anxiety/depression  -c/w home med escitalopram 5mg daily     -Pain control: Tylenol 975mg q8h, celebrex 100 mg daily at 8 am;  oxycodone 5 mg po q4h prn, oxycodone 5 mg po daily at 8 am    -GI/Bowel Mgmt: senna/miralax     - Diet: Regular, Halal      Precautions / PROPHYLAXIS:      - Falls    - Ortho: Weight bearing status: WBAT LLE      - DVT prophylaxis: lovenox

## 2025-03-30 RX ADMIN — Medication 975 MILLIGRAM(S): at 21:34

## 2025-03-30 RX ADMIN — OXYCODONE HYDROCHLORIDE 5 MILLIGRAM(S): 30 TABLET ORAL at 08:17

## 2025-03-30 RX ADMIN — Medication 2000 UNIT(S): at 12:48

## 2025-03-30 RX ADMIN — DONEPEZIL HYDROCHLORIDE 10 MILLIGRAM(S): 5 TABLET ORAL at 21:34

## 2025-03-30 RX ADMIN — Medication 975 MILLIGRAM(S): at 22:04

## 2025-03-30 RX ADMIN — Medication 975 MILLIGRAM(S): at 06:01

## 2025-03-30 RX ADMIN — Medication 1000 MILLILITER(S): at 09:01

## 2025-03-30 RX ADMIN — CELECOXIB 100 MILLIGRAM(S): 50 CAPSULE ORAL at 08:16

## 2025-03-30 RX ADMIN — CELECOXIB 100 MILLIGRAM(S): 50 CAPSULE ORAL at 09:31

## 2025-03-30 RX ADMIN — LIDOCAINE HYDROCHLORIDE 1 PATCH: 20 JELLY TOPICAL at 12:49

## 2025-03-30 RX ADMIN — Medication 975 MILLIGRAM(S): at 18:20

## 2025-03-30 RX ADMIN — ENOXAPARIN SODIUM 40 MILLIGRAM(S): 100 INJECTION SUBCUTANEOUS at 21:34

## 2025-03-30 RX ADMIN — Medication 1 TABLET(S): at 12:48

## 2025-03-30 RX ADMIN — ESCITALOPRAM OXALATE 5 MILLIGRAM(S): 20 TABLET ORAL at 12:48

## 2025-03-30 RX ADMIN — Medication 975 MILLIGRAM(S): at 05:31

## 2025-03-30 NOTE — PROGRESS NOTE ADULT - REASON FOR ADMISSION
Rehab of gait abnormality and decline in function 2/2 L hip IT fx s/p ORIF 3/26 Rehab for gait abnormality and decline in function 2/2 L hip IT fx s/p ORIF 3/26/2025

## 2025-03-30 NOTE — PROGRESS NOTE ADULT - ASSESSMENT
ASSESSMENT/PLAN    71 yo F with pmhx of mild dementia, anxiety bilateral cataracts presenting for mechanism fall with Left intertrochanteric hip s/p ORIF 3/24. Postop coarse complicated by orthostatic hypotension postop anemia. Her hip fracture is quite painful.      #Rehab of gait abnormality and decline in function 2/2 L hip IT fx s/p ORIF 3/26  -CT LLE: Acute, impacted left femoral intertrochanteric fracture.  -c/w Tylenol 975mg q8h ATC, celebrex 100 mg q8AM, and oxycodone 5 mg  q4h prn for pain, oxycodone 5 mg po q8am   -WBAT on the LLE  -PT/OT Eval and treat: Patient requires 3 hrs daily of interdisciplinary inpatient rehab at least 5 days a week.     #Post op anemia, left thigh echymosis  -Hgb 11.9 on admission   -Hgb 8.4 3/27  -Monitor CBC, transfuse <7    #Orthostatic hypotension  Close monitoring of her vitals  500 cc NS fluid bolus 3/29  1L NS 3/30    #MSK Sternal and rib pain, TTP  Lidocaine patch qd  Monitor    #Dementia, Mild  She benefit from and tolerate 3 hrs of daily intensive therapies.   -C/w home med donepezil 10mg qhs    #Anxiety/depression  -c/w home med escitalopram 5mg daily     -Pain control: Tylenol 975mg q8h, celebrex 100 mg daily at 8 am;  oxycodone 5 mg po q4h prn, oxycodone 5 mg po daily at 8 am    -GI/Bowel Mgmt: senna/miralax     - Diet: Regular, Halal      Precautions / PROPHYLAXIS:      - Falls    - Ortho: Weight bearing status: WBAT LLE      - DVT prophylaxis: lovenox  ASSESSMENT/PLAN    70 y.o. F with pmhx of mild dementia, anxiety bilateral cataracts presenting for mechanism fall with Left intertrochanteric hip s/p ORIF 3/24/2025. Postop coarse complicated by orthostatic hypotension postop anemia. Her hip fracture is quite painful.      #Rehab of gait abnormality and decline in function 2/2 L hip IT fx s/p ORIF 3/26/2025  -CT LLE: Acute, impacted left femoral intertrochanteric fracture.  -c/w Tylenol 975 mg q8h ATC, celebrex 100 mg q8AM, and oxycodone 5 mg  q4h prn for pain, oxycodone 5 mg po q8am   -WBAT on the LLE  -PT/OT Eval and treat: Patient requires 3 hrs daily of interdisciplinary inpatient rehab at least 5 days a week.     #Post op anemia, left thigh echymosis  -Hgb 11.9 on admission   -Hgb 8.4 3/27  -Monitor CBC, transfuse <7    #Orthostatic hypotension  Close monitoring of her vitals  500 cc NS fluid bolus 3/29  1L NS 3/30    #MSK Sternal and rib pain, TTP  Lidocaine patch qd  Monitor    #Dementia, Mild  She benefit from and tolerate 3 hrs of daily intensive therapies.   -C/w home med donepezil 10 mg qhs    #Anxiety/depression  -c/w home med escitalopram 5 mg daily     -Pain control: Tylenol 975 mg q8h, Celebrex 100 mg daily at 8 am;  oxycodone 5 mg po q4h prn, oxycodone 5 mg po daily at 8 a.m.    -GI/Bowel Mgmt: senna/Miralax     - Diet: Regular, Halal      Precautions / PROPHYLAXIS:      - Falls    - Ortho: Weight bearing status: WBAT LLE      - DVT prophylaxis: Lovenox

## 2025-03-30 NOTE — PROGRESS NOTE ADULT - SUBJECTIVE AND OBJECTIVE BOX
Patient is a 70y old  Female who presents with a chief complaint of Rehab of gait abnormality and decline in function 2/2 L hip IT fx s/p ORIF 3/26 (28 Mar 2025 14:30)      HPI:  69 yo F with PMH of mild dementia, anxiety, bilateral cataracts presented to the ED 3/24/25 s/p fall. Son stated that the pt was walking down stairs and slipped on her socks while descending the final 3 steps and fell on her left side. No head trauma or LOC. In ED CT LLE showed acute, impacted left femoral intertrochanteric fracture. Patient was evaluated by orthopedics and is s/p L hip ORIF 3/24. Post op course complicated by post op anemia and orthostatic hypotension s/p IVF, will continue to monitor in acute rehab.     Imaging:   XR Left Femur: Comminuted trochanteric fracture.  CTH negative  CT Cspine negative  CTAP: Acute, impacted left femoral intertrochanteric fracture. Sacral Tarlov cysts.  CT LLE: Acute, impacted left femoral intertrochanteric fracture.Soft tissues unremarkable. No hip dislocation. Degenerative change of left hip and left knee.      Prior to admission, the patient was independent in ADLs and ambulation without an assistive device. Patient now requires assistance with ambulation and ADLs 2/2 to L Hip ORIF. There is a significant functional decline from baseline. To return home it is in the patient’s best interest to have acute inpatient rehabilitative services to undergo intensive interdisciplinary therapy. Furthermore, the patient is motivated and is able to tolerate up to 3 hours of daily therapy for 5-6 days a week for a total of 15 hours a week. Evaluated by Physiatry and deemed to be an excellent candidate for admission to  for acute inpatient rehab. Admitted to Acute Rehab on 3/27/25.    Pt seen and examined by Physiatry and is currently functioning at [facility] bed mobility, modA transfers, 20’ RW modA, UBD standy-by, LBD modA.    LS: lives with family in  with 5[employer] and [employer] Inside  PLOF: independent in ADLs and ambulate with no assistive device     (28 Mar 2025 14:30)      I examined the patient and reviewed the chart. There have been no significant changes since my history and physical except where documented below.    TODAY'S SUBJECTIVE & REVIEW OF SYMPTOMS  Patient seen at bedside,  [id number] and with family. Notes continued L hip post op pain, improving in comparison to prior, will monitor. Patient also notes sternal and rib pain, TTP.   Patient s/p 500 cc bolus yesterday and given 1L bolus today due to continued decrease PO intake and fluctating BP. Will continue to monitor    Review of Systems: Constitutional:    [ x  ] WNL           [   ] poor appetite   [   ] insomnia   [   ] tired   Cardio:                [ x  ] WNL           [   ] CP   [   ] TOVAR   [   ] palpitations               Resp:                   [x   ] WNL           [   ] SOB   [   ] cough   [   ] wheezing   GI:                        [ x  ] WNL           [   ] constipation   [   ] diarrhea   [   ] abdominal pain   [   ] nausea   [   ] emesis                                :                      [  x ] WNL           [   ] ELIZALDE  [   ] dysuria   [   ] difficulty voiding             Endo:                   [ x  ] WNL          [   ] polyuria   [   ] temperature intolerance                 Skin:                     [   ] WNL          [   ] pain   [   ] wound   [   ] rash [x] L hip bruising   MSK:                    [   ] WNL          [ x  ] muscle pain   [ x  ] sternal and L rib joint pain [  x ] muscle tenderness   [   ] swelling   Neuro:                 [   x] WNL          [   ] HA   [   ] change in vision   [   ] tremor   [   ] weakness   [   ]dysphagia              Cognitive:           [ x  ] WNL           [   ]confusion      Psych:                  [  ] WNL           [   ] hallucinations   [   ]agitation   [   ] delusion   [ x  ]anxiety/depression      PHYSICAL EXAM    Vital Signs Last 24 Hrs  T(C): 36.7 (30 Mar 2025 05:35), Max: 36.8 (29 Mar 2025 13:37)  T(F): 98 (30 Mar 2025 05:35), Max: 98.3 (29 Mar 2025 13:37)  HR: 68 (30 Mar 2025 05:35) (68 - 82)  BP: 114/65 (30 Mar 2025 05:35) (102/62 - 116/66)  BP(mean): --  RR: 18 (30 Mar 2025 05:35) (18 - 19)  SpO2: 99% (30 Mar 2025 05:35) (99% - 99%)    Parameters below as of 30 Mar 2025 05:35  Patient On (Oxygen Delivery Method): room air    General:[x   ] NAD, Resting Comfortable,   [   ] other:                                HEENT: [x   ] NC/AT, EOMI, PERRL , Normal Conjunctivae,   [   ] other:  Cardio: [  x ] RRR, no murmur,   [   ] other:                              Pulm: [ x  ] No Respiratory Distress,  Lungs CTAB,   [   ] other:                       Abdomen: [x   ]ND/NT, Soft,   [   ] other:    : [ x  ] NO ELIZALDE CATHETER, [   ] ELIZALDE CATHETER- no meatal tear, no discharge, [   ] other:                                            MSK: [   ] No joint swelling, Full ROM,   [  x ] other: L thigh/hip swelling, L hip TTP, L hip ecchymosis                                         Ext: [ x  ]No C/C/E, No calf tenderness,   [   ]other:    Skin: [   ]intact,   [  x ] other: surgical dressing c/d/i                                                                   Neurological Examination:  Cognitive: [    ] AAO x 3,   [x    ]  other: not oriented to year, she knows the year. She is  pleasant and able to give her history. She follows simple commands  well.                                                                Attention:  [ x   ] intact,   [    ]  other:                            Mood/Affect: [   x ] wnl,    [    ]  other:                                                                             CN II - XII:  [  x  ] intact,  [    ] other:                                                                                        Motor:   RIGHT UE: [ x  ] WNL,  [   ] other:  LEFT    UE: [ x  ] WNL,  [   ] other:  RIGHT LE: [ x  ] WNL,  [   ] other:   LEFT    LE: [   ] WNL,  [  x ] other: HF 1+/5, KE/KF 3-/5, PF/DF 5/5 limited 2/2 pain    DTRs: [ x  ]symmetric, [   ] other:                                                                    Sensory: [  x  ] Intact to light touch,   [    ] other:    MEDICATIONS  (STANDING):  acetaminophen     Tablet .. 975 milliGRAM(s) Oral every 8 hours  celecoxib 100 milliGRAM(s) Oral <User Schedule>  donepezil 10 milliGRAM(s) Oral at bedtime  enoxaparin Injectable 40 milliGRAM(s) SubCutaneous every 24 hours  escitalopram 5 milliGRAM(s) Oral daily  lidocaine   4% Patch 1 Patch Transdermal daily  multivitamin 1 Tablet(s) Oral daily  polyethylene glycol 3350 17 Gram(s) Oral two times a day  senna 2 Tablet(s) Oral at bedtime    MEDICATIONS  (PRN):  melatonin 5 milliGRAM(s) Oral at bedtime PRN Insomnia  morphine  - Injectable 2 milliGRAM(s) IV Push every 6 hours PRN Severe Pain (7 - 10)  oxyCODONE    IR 5 milliGRAM(s) Oral every 4 hours PRN Moderate Pain (4 - 6)      RECENT LABS/IMAGING                        8.6    6.57  )-----------( 247      ( 29 Mar 2025 07:54 )             27.3     03-29    139  |  101  |  15  ----------------------------<  109[H]  4.6   |  28  |  0.7    Ca    8.9      29 Mar 2025 07:54  Mg     2.0     03-29    TPro  5.6[L]  /  Alb  3.5  /  TBili  0.4  /  DBili  x   /  AST  53[H]  /  ALT  56[H]  /  AlkPhos  86  03-29      Urinalysis Basic - ( 29 Mar 2025 07:54 )    Color: x / Appearance: x / SG: x / pH: x  Gluc: 109 mg/dL / Ketone: x  / Bili: x / Urobili: x   Blood: x / Protein: x / Nitrite: x   Leuk Esterase: x / RBC: x / WBC x   Sq Epi: x / Non Sq Epi: x / Bacteria: x     Patient is a 70 y.o. old female who presents with a chief complaint of Rehab of gait abnormality and decline in function 2/2 L hip IT fx s/p ORIF 3/26/2025 (28 Mar 2025 14:30)      HPI:  71 yo F with PMH of mild dementia, anxiety, bilateral cataracts presented to the ED 3/24/2025 s/p fall. Son stated that the pt was walking down stairs and slipped on her socks while descending the final 3 steps and fell on her left side. No head trauma or LOC. In ED CT LLE showed acute, impacted left femoral intertrochanteric fracture. Patient was evaluated by orthopedics and is s/p L hip ORIF 3/24/2025. Postop course complicated by postop anemia and orthostatic hypotension s/p IVF, will continue to monitor in acute rehab.     Imaging:   XR Left Femur: Comminuted trochanteric fracture.  CTH negative  CT Cspine negative  CTAP: Acute, impacted left femoral intertrochanteric fracture. Sacral Tarlov cysts.  CT LLE: Acute, impacted left femoral intertrochanteric fracture. Soft tissues unremarkable. No hip dislocation. Degenerative change of left hip and left knee.      Prior to admission, the patient was independent in ADLs and ambulation without an assistive device. Patient now requires assistance with ambulation and ADLs 2/2 to L Hip ORIF. There is a significant functional decline from baseline. To return home it is in the patient’s best interest to have acute inpatient rehabilitative services to undergo intensive interdisciplinary therapy. Furthermore, the patient is motivated and is able to tolerate up to 3 hours of daily therapy for 5-6 days a week for a total of 15 hours a week. Evaluated by Physiatry and deemed to be an excellent candidate for admission to  for acute inpatient rehab. Admitted to Acute Rehab on 3/27/2025.    Pt seen and examined by Physiatry and is currently functioning at [facility] bed mobility, modA transfers, 20’ RW modA, UBD standy-by, LBD modA.    LS: lives with family in  with [employer] and [employer]  PLOF: independent in ADLs and ambulate with no assistive device     (28 Mar 2025 14:30)      I examined the patient and reviewed the chart. There have been no significant changes since the history and physical except where documented below.    TODAY'S SUBJECTIVE & REVIEW OF SYMPTOMS  Patient seen at bedside,  [id number] and with family. Notes continued L hip postop pain, improving in comparison to prior, will monitor. Patient also notes sternal and rib pain, TTP.   Patient s/p 500 cc bolus yesterday and given 1L bolus today due to continued decrease PO intake and fluctuating BP. Will continue to monitor.    Review of Systems: Constitutional:    [ x  ] WNL           [   ] poor appetite   [   ] insomnia   [   ] tired   Cardio:                [ x  ] WNL           [   ] CP   [   ] TOVAR   [   ] palpitations               Resp:                   [x   ] WNL           [   ] SOB   [   ] cough   [   ] wheezing   GI:                        [ x  ] WNL           [   ] constipation   [   ] diarrhea   [   ] abdominal pain   [   ] nausea   [   ] emesis                                :                      [  x ] WNL           [   ] ELIZALDE  [   ] dysuria   [   ] difficulty voiding             Endo:                   [ x  ] WNL          [   ] polyuria   [   ] temperature intolerance                 Skin:                     [   ] WNL          [   ] pain   [   ] wound   [   ] rash [x] L hip bruising   MSK:                    [   ] WNL          [ x  ] muscle pain   [ x  ] sternal and L rib joint pain [  x ] muscle tenderness   [   ] swelling   Neuro:                 [   x] WNL          [   ] HA   [   ] change in vision   [   ] tremor   [   ] weakness   [   ]dysphagia              Cognitive:           [ x  ] WNL           [   ]confusion      Psych:                  [  ] WNL           [   ] hallucinations   [   ]agitation   [   ] delusion   [ x  ]anxiety/depression      PHYSICAL EXAM    Vital Signs Last 24 Hrs  T(C): 36.7 (30 Mar 2025 05:35), Max: 36.8 (29 Mar 2025 13:37)  T(F): 98 (30 Mar 2025 05:35), Max: 98.3 (29 Mar 2025 13:37)  HR: 68 (30 Mar 2025 05:35) (68 - 82)  BP: 114/65 (30 Mar 2025 05:35) (102/62 - 116/66)  BP(mean): --  RR: 18 (30 Mar 2025 05:35) (18 - 19)  SpO2: 99% (30 Mar 2025 05:35) (99% - 99%)    Parameters below as of 30 Mar 2025 05:35  Patient On (Oxygen Delivery Method): room air    General:[x   ] NAD, Resting Comfortable,   [   ] other:                                HEENT: [x   ] NC/AT, EOMI, PERRL , Normal Conjunctivae,   [   ] other:  Cardio: [  x ] RRR, no murmur,   [   ] other:                              Pulm: [ x  ] No Respiratory Distress,  Lungs CTAB,   [   ] other:                       Abdomen: [x   ]ND/NT, Soft,   [   ] other:    : [ x  ] NO ELIZALDE CATHETER, [   ] ELIZALDE CATHETER- no meatal tear, no discharge, [   ] other:                                            MSK: [   ] No joint swelling, Full ROM,   [  x ] other: L thigh/hip swelling, L hip TTP, L hip ecchymosis                                         Ext: [ x  ]No C/C/E, No calf tenderness,   [   ]other:    Skin: [   ]intact,   [  x ] other: surgical dressing c/d/i                                                                   Neurological Examination:  Cognitive: [    ] AAO x 3,   [x    ]  other: not oriented to year, she knows the year. She is  pleasant and able to give her history. She follows simple commands  well.                                                                Attention:  [ x   ] intact,   [    ]  other:                            Mood/Affect: [   x ] wnl,    [    ]  other:                                                                             CN II - XII:  [  x  ] intact,  [    ] other:                                                                                        Motor:   RIGHT UE: [ x  ] WNL,  [   ] other:  LEFT    UE: [ x  ] WNL,  [   ] other:  RIGHT LE: [ x  ] WNL,  [   ] other:   LEFT    LE: [   ] WNL,  [  x ] other: HF 1+/5, KE/KF 3-/5, PF/DF 5/5 limited 2/2 pain    DTRs: [ x  ]symmetric, [   ] other:                                                                    Sensory: [  x  ] Intact to light touch,   [    ] other:    MEDICATIONS  (STANDING):  acetaminophen     Tablet .. 975 milliGRAM(s) Oral every 8 hours  celecoxib 100 milliGRAM(s) Oral <User Schedule>  donepezil 10 milliGRAM(s) Oral at bedtime  enoxaparin Injectable 40 milliGRAM(s) SubCutaneous every 24 hours  escitalopram 5 milliGRAM(s) Oral daily  lidocaine   4% Patch 1 Patch Transdermal daily  multivitamin 1 Tablet(s) Oral daily  polyethylene glycol 3350 17 Gram(s) Oral two times a day  senna 2 Tablet(s) Oral at bedtime    MEDICATIONS  (PRN):  melatonin 5 milliGRAM(s) Oral at bedtime PRN Insomnia  morphine  - Injectable 2 milliGRAM(s) IV Push every 6 hours PRN Severe Pain (7 - 10)  oxyCODONE    IR 5 milliGRAM(s) Oral every 4 hours PRN Moderate Pain (4 - 6)      RECENT LABS/IMAGING                        8.6    6.57  )-----------( 247      ( 29 Mar 2025 07:54 )             27.3     03-29    139  |  101  |  15  ----------------------------<  109[H]  4.6   |  28  |  0.7    Ca    8.9      29 Mar 2025 07:54  Mg     2.0     03-29    TPro  5.6[L]  /  Alb  3.5  /  TBili  0.4  /  DBili  x   /  AST  53[H]  /  ALT  56[H]  /  AlkPhos  86  03-29      Urinalysis Basic - ( 29 Mar 2025 07:54 )    Color: x / Appearance: x / SG: x / pH: x  Gluc: 109 mg/dL / Ketone: x  / Bili: x / Urobili: x   Blood: x / Protein: x / Nitrite: x   Leuk Esterase: x / RBC: x / WBC x   Sq Epi: x / Non Sq Epi: x / Bacteria: x

## 2025-03-30 NOTE — PROGRESS NOTE ADULT - ATTENDING COMMENTS
Patient seen and examined with the rehab resident. We discussed the case. I have directed the care. I edited the note. The patient requires acute rehab with 3 hours of daily therapies at least 5 out of 7 days and close physiatry follow up.    ASSESSMENT/PLAN    70 y.o. F with pmhx of mild dementia, anxiety bilateral cataracts presenting for mechanism fall with left intertrochanteric hip s/p ORIF 3/24/2025. Postop coarse complicated by orthostatic hypotension postop anemia. Her hip fracture is quite painful.      #Rehab of gait abnormality and decline in function 2/2 L hip IT fx s/p ORIF 3/26/2025  -CT LLE: Acute, impacted left femoral intertrochanteric fracture.  -c/w Tylenol 975 mg q8h ATC, Celebrex 100 mg q8AM, and oxycodone 5 mg  q4h prn for pain, oxycodone 5 mg po q8am   -WBAT on the LLE  -PT/OT Eval and treat: Patient requires 3 hrs daily of interdisciplinary inpatient rehab at least 5 days a week.     #Post op anemia, left thigh echymosis  -Hgb 11.9 on admission   -Hgb 8.4 3/27  -Monitor CBC, transfuse <7    #Orthostatic hypotension  Close monitoring of her vitals  500 cc NS fluid bolus 3/29  1L NS 3/30    #MSK Sternal and rib pain, TTP  Lidocaine patch qd  Monitor    #Dementia, Mild  She benefit from and tolerate 3 hrs of daily intensive therapies.   -C/w home med donepezil 10 mg qhs    #Anxiety/depression  -c/w home med escitalopram 5 mg daily     -Pain control: Tylenol 975 mg q8h, Celebrex 100 mg daily at 8 am;  oxycodone 5 mg po q4h prn, oxycodone 5 mg po daily at 8 a.m.    -GI/Bowel Mgmt: senna/Miralax     - Diet: Regular, Halal      Precautions / PROPHYLAXIS:      - Falls    - Ortho: Weight bearing status: WBAT LLE      - DVT prophylaxis: Lovenox

## 2025-03-31 LAB
ALBUMIN SERPL ELPH-MCNC: 3.5 G/DL — SIGNIFICANT CHANGE UP (ref 3.5–5.2)
ALP SERPL-CCNC: 93 U/L — SIGNIFICANT CHANGE UP (ref 30–115)
ALT FLD-CCNC: 44 U/L — HIGH (ref 0–41)
ANION GAP SERPL CALC-SCNC: 10 MMOL/L — SIGNIFICANT CHANGE UP (ref 7–14)
AST SERPL-CCNC: 37 U/L — SIGNIFICANT CHANGE UP (ref 0–41)
BASOPHILS # BLD AUTO: 0.06 K/UL — SIGNIFICANT CHANGE UP (ref 0–0.2)
BASOPHILS NFR BLD AUTO: 1.1 % — HIGH (ref 0–1)
BILIRUB SERPL-MCNC: 0.5 MG/DL — SIGNIFICANT CHANGE UP (ref 0.2–1.2)
BUN SERPL-MCNC: 13 MG/DL — SIGNIFICANT CHANGE UP (ref 10–20)
CALCIUM SERPL-MCNC: 8.8 MG/DL — SIGNIFICANT CHANGE UP (ref 8.4–10.5)
CHLORIDE SERPL-SCNC: 103 MMOL/L — SIGNIFICANT CHANGE UP (ref 98–110)
CO2 SERPL-SCNC: 26 MMOL/L — SIGNIFICANT CHANGE UP (ref 17–32)
CREAT SERPL-MCNC: 0.6 MG/DL — LOW (ref 0.7–1.5)
EGFR: 96 ML/MIN/1.73M2 — SIGNIFICANT CHANGE UP
EGFR: 96 ML/MIN/1.73M2 — SIGNIFICANT CHANGE UP
EOSINOPHIL # BLD AUTO: 0.28 K/UL — SIGNIFICANT CHANGE UP (ref 0–0.7)
EOSINOPHIL NFR BLD AUTO: 5.2 % — SIGNIFICANT CHANGE UP (ref 0–8)
GLUCOSE SERPL-MCNC: 92 MG/DL — SIGNIFICANT CHANGE UP (ref 70–99)
HCT VFR BLD CALC: 25.9 % — LOW (ref 37–47)
HGB BLD-MCNC: 8.3 G/DL — LOW (ref 12–16)
IMM GRANULOCYTES NFR BLD AUTO: 0.4 % — HIGH (ref 0.1–0.3)
LYMPHOCYTES # BLD AUTO: 1.76 K/UL — SIGNIFICANT CHANGE UP (ref 1.2–3.4)
LYMPHOCYTES # BLD AUTO: 32.6 % — SIGNIFICANT CHANGE UP (ref 20.5–51.1)
MAGNESIUM SERPL-MCNC: 2 MG/DL — SIGNIFICANT CHANGE UP (ref 1.8–2.4)
MCHC RBC-ENTMCNC: 23.1 PG — LOW (ref 27–31)
MCHC RBC-ENTMCNC: 32 G/DL — SIGNIFICANT CHANGE UP (ref 32–37)
MCV RBC AUTO: 71.9 FL — LOW (ref 81–99)
MONOCYTES # BLD AUTO: 0.58 K/UL — SIGNIFICANT CHANGE UP (ref 0.1–0.6)
MONOCYTES NFR BLD AUTO: 10.7 % — HIGH (ref 1.7–9.3)
NEUTROPHILS # BLD AUTO: 2.7 K/UL — SIGNIFICANT CHANGE UP (ref 1.4–6.5)
NEUTROPHILS NFR BLD AUTO: 50 % — SIGNIFICANT CHANGE UP (ref 42.2–75.2)
NRBC BLD AUTO-RTO: 0 /100 WBCS — SIGNIFICANT CHANGE UP (ref 0–0)
PLATELET # BLD AUTO: 286 K/UL — SIGNIFICANT CHANGE UP (ref 130–400)
PMV BLD: 10.5 FL — HIGH (ref 7.4–10.4)
POTASSIUM SERPL-MCNC: 4.3 MMOL/L — SIGNIFICANT CHANGE UP (ref 3.5–5)
POTASSIUM SERPL-SCNC: 4.3 MMOL/L — SIGNIFICANT CHANGE UP (ref 3.5–5)
PROT SERPL-MCNC: 5.6 G/DL — LOW (ref 6–8)
RBC # BLD: 3.6 M/UL — LOW (ref 4.2–5.4)
RBC # FLD: 15.9 % — HIGH (ref 11.5–14.5)
SODIUM SERPL-SCNC: 139 MMOL/L — SIGNIFICANT CHANGE UP (ref 135–146)
WBC # BLD: 5.4 K/UL — SIGNIFICANT CHANGE UP (ref 4.8–10.8)
WBC # FLD AUTO: 5.4 K/UL — SIGNIFICANT CHANGE UP (ref 4.8–10.8)

## 2025-03-31 RX ADMIN — Medication 1 TABLET(S): at 12:57

## 2025-03-31 RX ADMIN — Medication 975 MILLIGRAM(S): at 19:31

## 2025-03-31 RX ADMIN — LIDOCAINE HYDROCHLORIDE 1 PATCH: 20 JELLY TOPICAL at 12:57

## 2025-03-31 RX ADMIN — CELECOXIB 100 MILLIGRAM(S): 50 CAPSULE ORAL at 08:24

## 2025-03-31 RX ADMIN — Medication 975 MILLIGRAM(S): at 06:28

## 2025-03-31 RX ADMIN — Medication 975 MILLIGRAM(S): at 22:00

## 2025-03-31 RX ADMIN — OXYCODONE HYDROCHLORIDE 5 MILLIGRAM(S): 30 TABLET ORAL at 08:24

## 2025-03-31 RX ADMIN — Medication 975 MILLIGRAM(S): at 12:50

## 2025-03-31 RX ADMIN — Medication 975 MILLIGRAM(S): at 05:58

## 2025-03-31 RX ADMIN — Medication 975 MILLIGRAM(S): at 21:27

## 2025-03-31 RX ADMIN — Medication 2000 UNIT(S): at 12:51

## 2025-03-31 RX ADMIN — ESCITALOPRAM OXALATE 5 MILLIGRAM(S): 20 TABLET ORAL at 12:51

## 2025-03-31 RX ADMIN — Medication 500 MILLILITER(S): at 10:17

## 2025-03-31 RX ADMIN — DONEPEZIL HYDROCHLORIDE 10 MILLIGRAM(S): 5 TABLET ORAL at 21:27

## 2025-03-31 RX ADMIN — ENOXAPARIN SODIUM 40 MILLIGRAM(S): 100 INJECTION SUBCUTANEOUS at 21:27

## 2025-03-31 NOTE — PROGRESS NOTE ADULT - ASSESSMENT
ASSESSMENT/PLAN    70 y.o. F with pmhx of mild dementia, anxiety bilateral cataracts presenting for mechanism fall with Left intertrochanteric hip s/p ORIF 3/24/2025. Postop coarse complicated by orthostatic hypotension postop anemia. Her hip fracture is quite painful.      #Rehab of gait abnormality and decline in function 2/2 L hip IT fx s/p ORIF 3/26/2025  -CT LLE: Acute, impacted left femoral intertrochanteric fracture.  -c/w Tylenol 975 mg q8h ATC, celebrex 100 mg q8AM, and oxycodone 5 mg  q4h prn for pain, oxycodone 5 mg po q8am   -WBAT on the LLE  -PT/OT Eval and treat: Patient requires 3 hrs daily of interdisciplinary inpatient rehab at least 5 days a week.     #Post op anemia, left thigh ecchymosis  -Hgb 11.9 on admission   -Hgb 8.4 3/27  -Monitor CBC, transfuse <7    #Orthostatic hypotension  Close monitoring of her vitals  500 cc NS fluid bolus 3/29  1L NS 3/30  500cc NS fluid bolus 3/31  - encouraged oral fluid intake      #MSK Sternal and rib pain, TTP  Lidocaine patch qd  Monitor    #Dementia, Mild  She benefit from and tolerate 3 hrs of daily intensive therapies.   -C/w home med donepezil 10 mg qhs    #Anxiety/depression  -c/w home med escitalopram 5 mg daily     -Pain control: Tylenol 975 mg q8h, Celebrex 100 mg daily at 8 am;  oxycodone 5 mg po q4h prn, oxycodone 5 mg po daily at 8 a.m.    -GI/Bowel Mgmt: senna/Miralax     - Diet: Regular, Halal      Precautions / PROPHYLAXIS:      - Falls    - Ortho: Weight bearing status: WBAT LLE      - DVT prophylaxis: Lovenox

## 2025-03-31 NOTE — PROGRESS NOTE ADULT - SUBJECTIVE AND OBJECTIVE BOX
Patient is a 70 y.o. old female who presents with a chief complaint of Rehab of gait abnormality and decline in function 2/2 L hip IT fx s/p ORIF 3/26/2025 (28 Mar 2025 14:30)      HPI:  71 yo F with PMH of mild dementia, anxiety, bilateral cataracts presented to the ED 3/24/2025 s/p fall. Son stated that the pt was walking down stairs and slipped on her socks while descending the final 3 steps and fell on her left side. No head trauma or LOC. In ED CT LLE showed acute, impacted left femoral intertrochanteric fracture. Patient was evaluated by orthopedics and is s/p L hip ORIF 3/24/2025. Postop course complicated by postop anemia and orthostatic hypotension s/p IVF, will continue to monitor in acute rehab.     Imaging:   XR Left Femur: Comminuted trochanteric fracture.  CTH negative  CT Cspine negative  CTAP: Acute, impacted left femoral intertrochanteric fracture. Sacral Tarlov cysts.  CT LLE: Acute, impacted left femoral intertrochanteric fracture. Soft tissues unremarkable. No hip dislocation. Degenerative change of left hip and left knee.      Prior to admission, the patient was independent in ADLs and ambulation without an assistive device. Patient now requires assistance with ambulation and ADLs 2/2 to L Hip ORIF. There is a significant functional decline from baseline. To return home it is in the patient’s best interest to have acute inpatient rehabilitative services to undergo intensive interdisciplinary therapy. Furthermore, the patient is motivated and is able to tolerate up to 3 hours of daily therapy for 5-6 days a week for a total of 15 hours a week. Evaluated by Physiatry and deemed to be an excellent candidate for admission to  for acute inpatient rehab. Admitted to Acute Rehab on 3/27/2025.    Pt seen and examined by Physiatry and is currently functioning at maxA bed mobility, modA transfers, 20’ RW modA, UBD standy-by, LBD modA.    LS: lives with family in  with 5STE and 1FOS Inside  PLOF: independent in ADLs and ambulate with no assistive device     (28 Mar 2025 14:30)      I examined the patient and reviewed the chart. There have been no significant changes since the history and physical except where documented below.    TODAY'S SUBJECTIVE & REVIEW OF SYMPTOMS  Setswana  ID [id number]  Patient seen and examined this morning. No overnight events. No new complaints this morning.   Encourage increased oral fluid intake.  Voiding bowel/bladder spontaneously and regularly.  Vital signs reviewed. Labs reviewed.    Will give 500cc NS bolus today          PHYSICAL EXAM  Vital Signs (24 Hrs):  T(C): 36.7 (03-31-25 @ 05:35), Max: 37.1 (03-30-25 @ 19:58)  HR: 71 (03-31-25 @ 10:13) (67 - 73)  BP: 99/59 (03-31-25 @ 10:13) (99/59 - 116/69)  RR: 18 (03-31-25 @ 05:35) (18 - 18)  SpO2: 95% (03-31-25 @ 05:35) (95% - 95%)  Wt(kg): --  Daily     Daily     I&O's Summary    31 Mar 2025 07:01  -  31 Mar 2025 12:54  --------------------------------------------------------  IN: 500 mL / OUT: 0 mL / NET: 500 mL      General:[x   ] NAD, Resting Comfortable,   [   ] other:                                HEENT: [x   ] NC/AT, EOMI, PERRL , Normal Conjunctivae,   [   ] other:  Cardio: [  x ] RRR, no murmur,   [   ] other:                              Pulm: [ x  ] No Respiratory Distress,  Lungs CTAB,   [   ] other:                       Abdomen: [x   ]ND/NT, Soft,   [   ] other:    : [ x  ] NO ELIZALDE CATHETER, [   ] ELIZALDE CATHETER- no meatal tear, no discharge, [   ] other:                                            MSK: [   ] No joint swelling, Full ROM,   [  x ] other: L thigh/hip swelling, L hip TTP, L hip ecchymosis                                         Ext: [ x  ]No C/C/E, No calf tenderness,   [   ]other:    Skin: [   ]intact,   [  x ] other: surgical dressing c/d/i                                                                   Neurological Examination:  Cognitive: [    ] AAO x 3,   [x    ]  other: not oriented to year, she knows the year. She is  pleasant and able to give her history. She follows simple commands  well.                                                                Attention:  [ x   ] intact,   [    ]  other:                            Mood/Affect: [   x ] wnl,    [    ]  other:                                                                             CN II - XII:  [  x  ] intact,  [    ] other:                                                                                        Motor:   RIGHT UE: [ x  ] WNL,  [   ] other:  LEFT    UE: [ x  ] WNL,  [   ] other:  RIGHT LE: [ x  ] WNL,  [   ] other:   LEFT    LE: [   ] WNL,  [  x ] other: HF 1+/5, KE/KF 3-/5, PF/DF 5/5 limited 2/2 pain    DTRs: [ x  ]symmetric, [   ] other:                                                                    Sensory: [  x  ] Intact to light touch,   [    ] other:    MEDICATIONS  (STANDING):  acetaminophen     Tablet .. 975 milliGRAM(s) Oral every 8 hours  celecoxib 100 milliGRAM(s) Oral <User Schedule>  cholecalciferol 2000 Unit(s) Oral daily  donepezil 10 milliGRAM(s) Oral at bedtime  enoxaparin Injectable 40 milliGRAM(s) SubCutaneous every 24 hours  escitalopram 5 milliGRAM(s) Oral daily  lidocaine   4% Patch 1 Patch Transdermal daily  multivitamin 1 Tablet(s) Oral daily  oxyCODONE    IR 5 milliGRAM(s) Oral <User Schedule>  polyethylene glycol 3350 17 Gram(s) Oral at bedtime    MEDICATIONS  (PRN):  melatonin 5 milliGRAM(s) Oral at bedtime PRN Insomnia  oxyCODONE    IR 5 milliGRAM(s) Oral every 4 hours PRN Moderate Pain (4 - 6)      RECENT LABS/IMAGING                          8.3    5.40  )-----------( 286      ( 31 Mar 2025 07:30 )             25.9     03-31    139  |  103  |  13  ----------------------------<  92  4.3   |  26  |  0.6[L]    Ca    8.8      31 Mar 2025 07:30  Mg     2.0     03-31    TPro  5.6[L]  /  Alb  3.5  /  TBili  0.5  /  DBili  x   /  AST  37  /  ALT  44[H]  /  AlkPhos  93  03-31      Urinalysis Basic - ( 31 Mar 2025 07:30 )    Color: x / Appearance: x / SG: x / pH: x  Gluc: 92 mg/dL / Ketone: x  / Bili: x / Urobili: x   Blood: x / Protein: x / Nitrite: x   Leuk Esterase: x / RBC: x / WBC x   Sq Epi: x / Non Sq Epi: x / Bacteria: x     Admitted

## 2025-03-31 NOTE — PROGRESS NOTE ADULT - ATTENDING COMMENTS
Patient seen and examined with the resident. We discussed the case. I have directed the care. I edited the note. The patient requires acute rehab with 3 hours of daily therapies at least 5 out of 7 days and close physiatry follow up.  #Rehab of gait abnormality and decline in function 2/2 L hip IT fx s/p ORIF 3/26/2025  -CT LLE: Acute, impacted left femoral intertrochanteric fracture.  -c/w Tylenol 975 mg q8h ATC, celebrex 100 mg q8AM, and oxycodone 5 mg  q4h prn for pain, oxycodone 5 mg po q8am   -WBAT on the LLE  -PT/OT Eval and treat: Patient requires 3 hrs daily of interdisciplinary inpatient rehab at least 5 days a week.     #Post op anemia, left thigh ecchymosis  -Hgb 11.9 on admission   -Hgb 8.4 3/27  -Monitor CBC, transfuse <7    #Orthostatic hypotension  Close monitoring of her vitals  500 cc NS fluid bolus 3/29  1L NS 3/30  500cc NS fluid bolus 3/31  - encouraged oral fluid intake      #MSK Sternal and rib pain, TTP  Lidocaine patch qd  Monitor    #Dementia, Mild  She benefit from and tolerate 3 hrs of daily intensive therapies.   -C/w home med donepezil 10 mg qhs    #Anxiety/depression  -c/w home med escitalopram 5 mg daily     -Pain control: Tylenol 975 mg q8h, Celebrex 100 mg daily at 8 am;  oxycodone 5 mg po q4h prn, oxycodone 5 mg po daily at 8 a.m.

## 2025-04-01 RX ORDER — OXYCODONE HYDROCHLORIDE 30 MG/1
5 TABLET ORAL
Refills: 0 | Status: DISCONTINUED | OUTPATIENT
Start: 2025-04-01 | End: 2025-04-08

## 2025-04-01 RX ORDER — MIDODRINE HYDROCHLORIDE 5 MG/1
2.5 TABLET ORAL
Refills: 0 | Status: DISCONTINUED | OUTPATIENT
Start: 2025-04-01 | End: 2025-04-02

## 2025-04-01 RX ADMIN — Medication 975 MILLIGRAM(S): at 12:34

## 2025-04-01 RX ADMIN — LIDOCAINE HYDROCHLORIDE 1 PATCH: 20 JELLY TOPICAL at 12:35

## 2025-04-01 RX ADMIN — Medication 975 MILLIGRAM(S): at 21:30

## 2025-04-01 RX ADMIN — Medication 1 TABLET(S): at 12:36

## 2025-04-01 RX ADMIN — CELECOXIB 100 MILLIGRAM(S): 50 CAPSULE ORAL at 08:11

## 2025-04-01 RX ADMIN — Medication 975 MILLIGRAM(S): at 22:00

## 2025-04-01 RX ADMIN — ESCITALOPRAM OXALATE 5 MILLIGRAM(S): 20 TABLET ORAL at 12:35

## 2025-04-01 RX ADMIN — MIDODRINE HYDROCHLORIDE 2.5 MILLIGRAM(S): 5 TABLET ORAL at 12:35

## 2025-04-01 RX ADMIN — DONEPEZIL HYDROCHLORIDE 10 MILLIGRAM(S): 5 TABLET ORAL at 21:30

## 2025-04-01 RX ADMIN — ENOXAPARIN SODIUM 40 MILLIGRAM(S): 100 INJECTION SUBCUTANEOUS at 21:31

## 2025-04-01 RX ADMIN — OXYCODONE HYDROCHLORIDE 5 MILLIGRAM(S): 30 TABLET ORAL at 08:10

## 2025-04-01 RX ADMIN — Medication 5 MILLIGRAM(S): at 21:30

## 2025-04-01 RX ADMIN — OXYCODONE HYDROCHLORIDE 5 MILLIGRAM(S): 30 TABLET ORAL at 12:39

## 2025-04-01 RX ADMIN — Medication 975 MILLIGRAM(S): at 07:30

## 2025-04-01 RX ADMIN — Medication 975 MILLIGRAM(S): at 06:27

## 2025-04-01 RX ADMIN — Medication 2000 UNIT(S): at 12:36

## 2025-04-01 NOTE — PROGRESS NOTE ADULT - ATTENDING COMMENTS
Patient seen and examined with the resident. We discussed the case. I have directed the care. I edited the note. The patient requires acute rehab with 3 hours of daily therapies at least 5 out of 7 days and close physiatry follow up. I added midodrine 2.5 mg po BID today for orthostasis.  #Rehab of gait abnormality and decline in function 2/2 L hip IT fx s/p ORIF 3/26/2025  -CT LLE: Acute, impacted left femoral intertrochanteric fracture.  -c/w Tylenol 975 mg q8h ATC, celebrex 100 mg q8AM, and oxycodone 5 mg  q4h prn for pain, oxycodone 5 mg po q8am   -WBAT on the LLE  -PT/OT Eval and treat: Patient requires 3 hrs daily of interdisciplinary inpatient rehab at least 5 days a week.     #Post op anemia, left thigh ecchymosis  -Hgb 11.9 on admission   -Hgb 8.4 3/27  -Monitor CBC, transfuse <7    #Orthostatic hypotension  Close monitoring of her vitals  500 cc NS fluid bolus 3/29  1L NS 3/30  500cc NS fluid bolus 3/31  - encouraged oral fluid intake  - start midodrine 2.5 mg bid (7:30 am and 12:30 pm) (4/1)      #MSK Sternal and rib pain, TTP  Lidocaine patch qd  Monitor    #Dementia, Mild  She benefit from and tolerate 3 hrs of daily intensive therapies.   -C/w home med donepezil 10 mg qhs    #Anxiety/depression  -c/w home med escitalopram 5 mg daily     -Pain control: Tylenol 975 mg q8h, Celebrex 100 mg daily at 8 am;  oxycodone 5 mg po q4h prn, oxycodone 5 mg po daily at 8 a.m and 12:30 pm

## 2025-04-01 NOTE — PROGRESS NOTE ADULT - ASSESSMENT
ASSESSMENT/PLAN    70 y.o. F with pmhx of mild dementia, anxiety bilateral cataracts presenting for mechanism fall with Left intertrochanteric hip s/p ORIF 3/24/2025. Postop coarse complicated by orthostatic hypotension postop anemia. Her hip fracture is quite painful.      #Rehab of gait abnormality and decline in function 2/2 L hip IT fx s/p ORIF 3/26/2025  -CT LLE: Acute, impacted left femoral intertrochanteric fracture.  -c/w Tylenol 975 mg q8h ATC, celebrex 100 mg q8AM, and oxycodone 5 mg  q4h prn for pain, oxycodone 5 mg po q8am   -WBAT on the LLE  -PT/OT Eval and treat: Patient requires 3 hrs daily of interdisciplinary inpatient rehab at least 5 days a week.     #Post op anemia, left thigh ecchymosis  -Hgb 11.9 on admission   -Hgb 8.4 3/27  -Monitor CBC, transfuse <7    #Orthostatic hypotension  Close monitoring of her vitals  500 cc NS fluid bolus 3/29  1L NS 3/30  500cc NS fluid bolus 3/31  - encouraged oral fluid intake  - start midodrine 2.5 mg bid (7:30 am and 12:30 pm) (4/1)      #MSK Sternal and rib pain, TTP  Lidocaine patch qd  Monitor    #Dementia, Mild  She benefit from and tolerate 3 hrs of daily intensive therapies.   -C/w home med donepezil 10 mg qhs    #Anxiety/depression  -c/w home med escitalopram 5 mg daily     -Pain control: Tylenol 975 mg q8h, Celebrex 100 mg daily at 8 am;  oxycodone 5 mg po q4h prn, oxycodone 5 mg po daily at 8 a.m and 12:30 pm    -GI/Bowel Mgmt: senna/Miralax     - Diet: Regular, Halal      Precautions / PROPHYLAXIS:      - Falls    - Ortho: Weight bearing status: WBAT LLE      - DVT prophylaxis: Lovenox

## 2025-04-01 NOTE — PROGRESS NOTE ADULT - SUBJECTIVE AND OBJECTIVE BOX
Patient is a 70 y.o. old female who presents with a chief complaint of Rehab of gait abnormality and decline in function 2/2 L hip IT fx s/p ORIF 3/26/2025 (28 Mar 2025 14:30)      HPI:  69 yo F with PMH of mild dementia, anxiety, bilateral cataracts presented to the ED 3/24/2025 s/p fall. Son stated that the pt was walking down stairs and slipped on her socks while descending the final 3 steps and fell on her left side. No head trauma or LOC. In ED CT LLE showed acute, impacted left femoral intertrochanteric fracture. Patient was evaluated by orthopedics and is s/p L hip ORIF 3/24/2025. Postop course complicated by postop anemia and orthostatic hypotension s/p IVF, will continue to monitor in acute rehab.     Imaging:   XR Left Femur: Comminuted trochanteric fracture.  CTH negative  CT Cspine negative  CTAP: Acute, impacted left femoral intertrochanteric fracture. Sacral Tarlov cysts.  CT LLE: Acute, impacted left femoral intertrochanteric fracture. Soft tissues unremarkable. No hip dislocation. Degenerative change of left hip and left knee.      Prior to admission, the patient was independent in ADLs and ambulation without an assistive device. Patient now requires assistance with ambulation and ADLs 2/2 to L Hip ORIF. There is a significant functional decline from baseline. To return home it is in the patient’s best interest to have acute inpatient rehabilitative services to undergo intensive interdisciplinary therapy. Furthermore, the patient is motivated and is able to tolerate up to 3 hours of daily therapy for 5-6 days a week for a total of 15 hours a week. Evaluated by Physiatry and deemed to be an excellent candidate for admission to  for acute inpatient rehab. Admitted to Acute Rehab on 3/27/2025.    Pt seen and examined by Physiatry and is currently functioning at maxA bed mobility, modA transfers, 20’ RW modA, UBD standy-by, LBD modA.    LS: lives with family in  with 5STE and 1FOS Inside  PLOF: independent in ADLs and ambulate with no assistive device     (28 Mar 2025 14:30)      I examined the patient and reviewed the chart. There have been no significant changes since the history and physical except where documented below.    TODAY'S SUBJECTIVE & REVIEW OF SYMPTOMS  [place]  ID [id number]  Patient seen and examined this morning.   No overnight events.   No new complaints this morning other than lightheadedness.  Patient felt better than yesterday.  Tolerating PT/OT and oral intake well.     Voiding bowel/bladder spontaneously and regularly.  Vital signs reviewed.  BP this am 106/58; HR 63  BP dropped to 95/46 with HR 60 this morning when with PT.    Will start on midodrine 2.5 mg scheduled at 7:30 am and 12:30 pm.  Will schedule oxycodone at 8 am and 12:30 pm.          PHYSICAL EXAM  Vital Signs (24 Hrs):  T(C): 36.7 (04-01-25 @ 06:49), Max: 37.1 (03-31-25 @ 19:54)  HR: 63 (04-01-25 @ 06:49) (63 - 80)  BP: 106/58 (04-01-25 @ 06:49) (93/53 - 106/58)  RR: 18 (04-01-25 @ 06:49) (18 - 18)  SpO2: 98% (04-01-25 @ 06:49) (98% - 100%)  Wt(kg): --  Daily     Daily     I&O's Summary    31 Mar 2025 07:01  -  01 Apr 2025 07:00  --------------------------------------------------------  IN: 500 mL / OUT: 0 mL / NET: 500 mL    General:[x   ] NAD, Resting Comfortable,   [   ] other:                                HEENT: [x   ] NC/AT, EOMI, PERRL , Normal Conjunctivae,   [   ] other:  Cardio: [  x ] RRR, no murmur,   [   ] other:                              Pulm: [ x  ] No Respiratory Distress,  Lungs CTAB,   [   ] other:                       Abdomen: [x   ]ND/NT, Soft,   [   ] other:    : [ x  ] NO ELIZALDE CATHETER, [   ] ELIZALDE CATHETER- no meatal tear, no discharge, [   ] other:                                            MSK: [   ] No joint swelling, Full ROM,   [  x ] other: L thigh/hip swelling, L hip TTP, L hip ecchymosis                                         Ext: [ x  ]No C/C/E, No calf tenderness,   [   ]other:    Skin: [   ]intact,   [  x ] other: surgical dressing c/d/i                                                                   Neurological Examination:  Cognitive: [    ] AAO x 3,   [x    ]  other: not oriented to year, she knows the year. She is  pleasant and able to give her history. She follows simple commands  well.                                                                Attention:  [ x   ] intact,   [    ]  other:                            Mood/Affect: [   x ] wnl,    [    ]  other:                                                                             CN II - XII:  [  x  ] intact,  [    ] other:                                                                                        Motor:   RIGHT UE: [ x  ] WNL,  [   ] other:  LEFT    UE: [ x  ] WNL,  [   ] other:  RIGHT LE: [ x  ] WNL,  [   ] other:   LEFT    LE: [   ] WNL,  [  x ] other: HF 1+/5, KE/KF 3-/5, PF/DF 5/5 limited 2/2 pain    DTRs: [ x  ]symmetric, [   ] other:                                                                    Sensory: [  x  ] Intact to light touch,   [    ] other:      MEDICATIONS  (STANDING):  acetaminophen     Tablet .. 975 milliGRAM(s) Oral every 8 hours  celecoxib 100 milliGRAM(s) Oral <User Schedule>  cholecalciferol 2000 Unit(s) Oral daily  donepezil 10 milliGRAM(s) Oral at bedtime  enoxaparin Injectable 40 milliGRAM(s) SubCutaneous every 24 hours  escitalopram 5 milliGRAM(s) Oral daily  lidocaine   4% Patch 1 Patch Transdermal daily  midodrine. 2.5 milliGRAM(s) Oral <User Schedule>  multivitamin 1 Tablet(s) Oral daily  oxyCODONE    IR 5 milliGRAM(s) Oral <User Schedule>  polyethylene glycol 3350 17 Gram(s) Oral at bedtime    MEDICATIONS  (PRN):  melatonin 5 milliGRAM(s) Oral at bedtime PRN Insomnia  oxyCODONE    IR 5 milliGRAM(s) Oral every 4 hours PRN Moderate Pain (4 - 6)      RECENT LABS/IMAGING                          8.3    5.40  )-----------( 286      ( 31 Mar 2025 07:30 )             25.9     03-31    139  |  103  |  13  ----------------------------<  92  4.3   |  26  |  0.6[L]    Ca    8.8      31 Mar 2025 07:30  Mg     2.0     03-31    TPro  5.6[L]  /  Alb  3.5  /  TBili  0.5  /  DBili  x   /  AST  37  /  ALT  44[H]  /  AlkPhos  93  03-31      Urinalysis Basic - ( 31 Mar 2025 07:30 )    Color: x / Appearance: x / SG: x / pH: x  Gluc: 92 mg/dL / Ketone: x  / Bili: x / Urobili: x   Blood: x / Protein: x / Nitrite: x   Leuk Esterase: x / RBC: x / WBC x   Sq Epi: x / Non Sq Epi: x / Bacteria: x

## 2025-04-02 DIAGNOSIS — I95.1 ORTHOSTATIC HYPOTENSION: ICD-10-CM
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
## 2025-04-02 DIAGNOSIS — S72.142A DISPLACED INTERTROCHANTERIC FRACTURE OF LEFT FEMUR, INITIAL ENCOUNTER FOR CLOSED FRACTURE: ICD-10-CM

## 2025-04-02 DIAGNOSIS — E87.1 HYPO-OSMOLALITY AND HYPONATREMIA: ICD-10-CM

## 2025-04-02 DIAGNOSIS — S70.02XA CONTUSION OF LEFT HIP, INITIAL ENCOUNTER: ICD-10-CM

## 2025-04-02 DIAGNOSIS — D50.9 IRON DEFICIENCY ANEMIA, UNSPECIFIED: ICD-10-CM
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
## 2025-04-02 DIAGNOSIS — D62 ACUTE POSTHEMORRHAGIC ANEMIA: ICD-10-CM

## 2025-04-02 DIAGNOSIS — K59.00 CONSTIPATION, UNSPECIFIED: ICD-10-CM
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
## 2025-04-02 RX ORDER — HYDROCORTISONE 10 MG/G
1 CREAM TOPICAL
Refills: 0 | Status: DISCONTINUED | OUTPATIENT
Start: 2025-04-02 | End: 2025-04-05

## 2025-04-02 RX ORDER — DIPHENHYDRAMINE HCL 12.5MG/5ML
25 ELIXIR ORAL ONCE
Refills: 0 | Status: DISCONTINUED | OUTPATIENT
Start: 2025-04-02 | End: 2025-04-02

## 2025-04-02 RX ORDER — MIDODRINE HYDROCHLORIDE 5 MG/1
5 TABLET ORAL
Refills: 0 | Status: DISCONTINUED | OUTPATIENT
Start: 2025-04-02 | End: 2025-04-14

## 2025-04-02 RX ORDER — OXYCODONE HYDROCHLORIDE 30 MG/1
5 TABLET ORAL EVERY 4 HOURS
Refills: 0 | Status: DISCONTINUED | OUTPATIENT
Start: 2025-04-05 | End: 2025-04-05

## 2025-04-02 RX ADMIN — Medication 1 TABLET(S): at 12:52

## 2025-04-02 RX ADMIN — OXYCODONE HYDROCHLORIDE 5 MILLIGRAM(S): 30 TABLET ORAL at 13:02

## 2025-04-02 RX ADMIN — OXYCODONE HYDROCHLORIDE 5 MILLIGRAM(S): 30 TABLET ORAL at 18:16

## 2025-04-02 RX ADMIN — Medication 2000 UNIT(S): at 12:53

## 2025-04-02 RX ADMIN — HYDROCORTISONE 1 APPLICATION(S): 10 CREAM TOPICAL at 18:27

## 2025-04-02 RX ADMIN — OXYCODONE HYDROCHLORIDE 5 MILLIGRAM(S): 30 TABLET ORAL at 08:33

## 2025-04-02 RX ADMIN — MIDODRINE HYDROCHLORIDE 5 MILLIGRAM(S): 5 TABLET ORAL at 12:54

## 2025-04-02 RX ADMIN — MIDODRINE HYDROCHLORIDE 2.5 MILLIGRAM(S): 5 TABLET ORAL at 06:42

## 2025-04-02 RX ADMIN — OXYCODONE HYDROCHLORIDE 5 MILLIGRAM(S): 30 TABLET ORAL at 13:07

## 2025-04-02 RX ADMIN — CELECOXIB 100 MILLIGRAM(S): 50 CAPSULE ORAL at 13:07

## 2025-04-02 RX ADMIN — DONEPEZIL HYDROCHLORIDE 10 MILLIGRAM(S): 5 TABLET ORAL at 21:53

## 2025-04-02 RX ADMIN — ENOXAPARIN SODIUM 40 MILLIGRAM(S): 100 INJECTION SUBCUTANEOUS at 21:53

## 2025-04-02 RX ADMIN — Medication 975 MILLIGRAM(S): at 07:30

## 2025-04-02 RX ADMIN — Medication 975 MILLIGRAM(S): at 21:54

## 2025-04-02 RX ADMIN — Medication 975 MILLIGRAM(S): at 15:53

## 2025-04-02 RX ADMIN — Medication 975 MILLIGRAM(S): at 18:16

## 2025-04-02 RX ADMIN — Medication 500 MILLILITER(S): at 18:31

## 2025-04-02 RX ADMIN — LIDOCAINE HYDROCHLORIDE 1 PATCH: 20 JELLY TOPICAL at 12:50

## 2025-04-02 RX ADMIN — CELECOXIB 100 MILLIGRAM(S): 50 CAPSULE ORAL at 08:33

## 2025-04-02 RX ADMIN — Medication 975 MILLIGRAM(S): at 06:42

## 2025-04-02 RX ADMIN — ESCITALOPRAM OXALATE 5 MILLIGRAM(S): 20 TABLET ORAL at 12:51

## 2025-04-02 RX ADMIN — Medication 975 MILLIGRAM(S): at 22:24

## 2025-04-02 NOTE — PROGRESS NOTE ADULT - ASSESSMENT
ASSESSMENT/PLAN    70 y.o. F with pmhx of mild dementia, anxiety bilateral cataracts presenting for mechanism fall with Left intertrochanteric hip s/p ORIF 3/24/2025. Postop coarse complicated by orthostatic hypotension postop anemia. Her hip fracture is quite painful.      #Rehab of gait abnormality and decline in function 2/2 L hip IT fx s/p ORIF 3/26/2025  -CT LLE: Acute, impacted left femoral intertrochanteric fracture.  -c/w Tylenol 975 mg q8h ATC, celebrex 100 mg q8AM, and oxycodone 5 mg  q4h prn for pain, oxycodone 5 mg po q8am   -WBAT on the LLE  -PT/OT Eval and treat: Patient requires 3 hrs daily of interdisciplinary inpatient rehab at least 5 days a week.     #Post op anemia, left thigh ecchymosis  -Hgb 11.9 on admission   -Hgb 8.3 3/31  -Monitor CBC, transfuse <7    #Orthostatic hypotension  Close monitoring of her vitals  500 cc NS fluid bolus 3/29  1L NS 3/30  500cc NS fluid bolus 3/31  - encouraged oral fluid intake  - c/w midodrine 2.5 mg bid (7:30 am and 12:30 pm) (started 4/1)      #MSK Sternal and rib pain, TTP  Lidocaine patch qd  Monitor    #Dementia, Mild  She benefit from and tolerate 3 hrs of daily intensive therapies.   -C/w home med donepezil 10 mg qhs    #Anxiety/depression  -c/w home med escitalopram 5 mg daily     -Pain control: Tylenol 975 mg q8h, Celebrex 100 mg daily at 8 am;  oxycodone 5 mg po q4h prn, oxycodone 5 mg po daily at 8 a.m and 12:30 pm    -GI/Bowel Mgmt: senna/Miralax     - Diet: Regular, Halal      Precautions / PROPHYLAXIS:      - Falls    - Ortho: Weight bearing status: WBAT LLE      - DVT prophylaxis: Lovenox  ASSESSMENT/PLAN    70 y.o. F with pmhx of mild dementia, anxiety bilateral cataracts presenting for mechanism fall with Left intertrochanteric hip s/p ORIF 3/24/2025. Postop coarse complicated by orthostatic hypotension postop anemia. Her hip fracture is quite painful.      #Rehab of gait abnormality and decline in function 2/2 L hip IT fx s/p ORIF 3/26/2025  -CT LLE: Acute, impacted left femoral intertrochanteric fracture.  -c/w Tylenol 975 mg q8h ATC, celebrex 100 mg q8AM, and oxycodone 5 mg  q4h prn for pain, oxycodone 5 mg po q8am   -WBAT on the LLE  -PT/OT Eval and treat: Patient requires 3 hrs daily of interdisciplinary inpatient rehab at least 5 days a week.     #Post op anemia, left thigh ecchymosis  -Hgb 11.9 on admission   -Hgb 8.3 3/31  -Monitor CBC, transfuse <7    #Orthostatic hypotension  Close monitoring of her vitals  500 cc NS fluid bolus 3/29  1L NS 3/30  500cc NS fluid bolus 3/31  - encouraged oral fluid intake  - increase midodrine 2.5 mg bid to 5 mg bid(7:30 am and 12:30 pm) (4/2)      #MSK Sternal and rib pain, TTP  Lidocaine patch qd  Monitor    #Dementia, Mild  She benefit from and tolerate 3 hrs of daily intensive therapies.   -C/w home med donepezil 10 mg qhs    #Anxiety/depression  -c/w home med escitalopram 5 mg daily    #Contact dermatitis (4/2)  involving buttocks  - apply hydrocortisone ointment bid     -Pain control: Tylenol 975 mg q8h, Celebrex 100 mg daily at 8 am;  oxycodone 5 mg po q4h prn, oxycodone 5 mg po daily at 8 a.m and 12:30 pm    -GI/Bowel Mgmt: senna/Miralax     - Diet: Regular, Halal      Precautions / PROPHYLAXIS:      - Falls    - Ortho: Weight bearing status: WBAT LLE      - DVT prophylaxis: Lovenox  ASSESSMENT/PLAN    70 y.o. F with pmhx of mild dementia, anxiety bilateral cataracts presenting for mechanism fall with Left intertrochanteric hip s/p ORIF 3/24/2025. Postop coarse complicated by orthostatic hypotension postop anemia. Her hip fracture is quite painful.      #Rehab of gait abnormality and decline in function 2/2 L hip IT fx s/p ORIF 3/26/2025  -CT LLE: Acute, impacted left femoral intertrochanteric fracture.  -c/w Tylenol 975 mg q8h ATC, celebrex 100 mg q8AM, and oxycodone 5 mg  q4h prn for pain, oxycodone 5 mg po q8am   -WBAT on the LLE  -PT/OT Eval and treat: Patient requires 3 hrs daily of interdisciplinary inpatient rehab at least 5 days a week.     #Post op anemia, left thigh ecchymosis  -Hgb 11.9 on admission   -Hgb 8.3 3/31  -Monitor CBC, transfuse <7    #Orthostatic hypotension  Close monitoring of her vitals  500 cc NS fluid bolus 3/29  1L NS 3/30  500cc NS fluid bolus 3/31  - encouraged oral fluid intake  -  cc bolus (4/2)  - increase midodrine 2.5 mg bid to 5 mg bid(7:30 am and 12:30 pm) (4/2)      #MSK Sternal and rib pain, TTP  Lidocaine patch qd  Monitor    #Dementia, Mild  She benefit from and tolerate 3 hrs of daily intensive therapies.   -C/w home med donepezil 10 mg qhs    #Anxiety/depression  -c/w home med escitalopram 5 mg daily    #Contact dermatitis (4/2)  involving buttocks  - apply hydrocortisone ointment bid     -Pain control: Tylenol 975 mg q8h, Celebrex 100 mg daily at 8 am;  oxycodone 5 mg po q4h prn, oxycodone 5 mg po daily at 8 a.m and 12:30 pm    -GI/Bowel Mgmt: senna/Miralax     - Diet: Regular, Halal      Precautions / PROPHYLAXIS:      - Falls    - Ortho: Weight bearing status: WBAT LLE      - DVT prophylaxis: Lovenox

## 2025-04-02 NOTE — PROGRESS NOTE ADULT - SUBJECTIVE AND OBJECTIVE BOX
Patient is a 70 y.o. old female who presents with a chief complaint of Rehab of gait abnormality and decline in function 2/2 L hip IT fx s/p ORIF 3/26/2025 (28 Mar 2025 14:30)      HPI:  69 yo F with PMH of mild dementia, anxiety, bilateral cataracts presented to the ED 3/24/2025 s/p fall. Son stated that the pt was walking down stairs and slipped on her socks while descending the final 3 steps and fell on her left side. No head trauma or LOC. In ED CT LLE showed acute, impacted left femoral intertrochanteric fracture. Patient was evaluated by orthopedics and is s/p L hip ORIF 3/24/2025. Postop course complicated by postop anemia and orthostatic hypotension s/p IVF, will continue to monitor in acute rehab.     Imaging:   XR Left Femur: Comminuted trochanteric fracture.  CTH negative  CT Cspine negative  CTAP: Acute, impacted left femoral intertrochanteric fracture. Sacral Tarlov cysts.  CT LLE: Acute, impacted left femoral intertrochanteric fracture. Soft tissues unremarkable. No hip dislocation. Degenerative change of left hip and left knee.      Prior to admission, the patient was independent in ADLs and ambulation without an assistive device. Patient now requires assistance with ambulation and ADLs 2/2 to L Hip ORIF. There is a significant functional decline from baseline. To return home it is in the patient’s best interest to have acute inpatient rehabilitative services to undergo intensive interdisciplinary therapy. Furthermore, the patient is motivated and is able to tolerate up to 3 hours of daily therapy for 5-6 days a week for a total of 15 hours a week. Evaluated by Physiatry and deemed to be an excellent candidate for admission to  for acute inpatient rehab. Admitted to Acute Rehab on 3/27/2025.    Pt seen and examined by Physiatry and is currently functioning at maxA bed mobility, modA transfers, 20’ RW modA, UBD standy-by, LBD modA.    LS: lives with family in  with 5STE and 1FOS Inside  PLOF: independent in ADLs and ambulate with no assistive device     (28 Mar 2025 14:30)      I examined the patient and reviewed the chart. There have been no significant changes since the history and physical except where documented below.    TODAY'S SUBJECTIVE & REVIEW OF SYMPTOMS  [place]  ID [id number]  Patient seen and examined this morning.   No overnight events.   No new complaints this morning.  Tolerating PT/OT and oral intake well.   Voiding bowel/bladder spontaneously and regularly.  Vital signs reviewed.        PHYSICAL EXAM    Vital Signs (24 Hrs):  T(C): 36.6 (04-02-25 @ 04:30), Max: 36.9 (04-01-25 @ 11:24)  HR: 63 (04-02-25 @ 04:30) (63 - 77)  BP: 106/66 (04-02-25 @ 04:30) (94/59 - 106/66)  RR: 18 (04-02-25 @ 04:30) (18 - 18)  SpO2: 98% (04-02-25 @ 04:30) (96% - 98%)  Wt(kg): --  Daily     Daily     I&O's Summary      General:[x   ] NAD, Resting Comfortable,   [   ] other:                                HEENT: [x   ] NC/AT, EOMI, PERRL , Normal Conjunctivae,   [   ] other:  Cardio: [  x ] RRR, no murmur,   [   ] other:                              Pulm: [ x  ] No Respiratory Distress,  Lungs CTAB,   [   ] other:                       Abdomen: [x   ]ND/NT, Soft,   [   ] other:    : [ x  ] NO ELIZALDE CATHETER, [   ] ELIZALDE CATHETER- no meatal tear, no discharge, [   ] other:                                            MSK: [   ] No joint swelling, Full ROM,   [  x ] other: L thigh/hip swelling, L hip TTP, L hip ecchymosis                                         Ext: [ x  ]No C/C/E, No calf tenderness,   [   ]other:    Skin: [   ]intact,   [  x ] other: surgical dressing c/d/i                                                                   Neurological Examination:  Cognitive: [    ] AAO x 3,   [x    ]  other: not oriented to year, she knows the year. She is  pleasant and able to give her history. She follows simple commands  well.                                                                Attention:  [ x   ] intact,   [    ]  other:                            Mood/Affect: [   x ] wnl,    [    ]  other:                                                                             CN II - XII:  [  x  ] intact,  [    ] other:                                                                                        Motor:   RIGHT UE: [ x  ] WNL,  [   ] other:  LEFT    UE: [ x  ] WNL,  [   ] other:  RIGHT LE: [ x  ] WNL,  [   ] other:   LEFT    LE: [   ] WNL,  [  x ] other: HF 1+/5, KE/KF 3-/5, PF/DF 5/5 limited 2/2 pain    DTRs: [ x  ]symmetric, [   ] other:                                                                    Sensory: [  x  ] Intact to light touch,   [    ] other:    MEDICATIONS  (STANDING):  acetaminophen     Tablet .. 975 milliGRAM(s) Oral every 8 hours  celecoxib 100 milliGRAM(s) Oral <User Schedule>  cholecalciferol 2000 Unit(s) Oral daily  donepezil 10 milliGRAM(s) Oral at bedtime  enoxaparin Injectable 40 milliGRAM(s) SubCutaneous every 24 hours  escitalopram 5 milliGRAM(s) Oral daily  lidocaine   4% Patch 1 Patch Transdermal daily  midodrine. 2.5 milliGRAM(s) Oral <User Schedule>  multivitamin 1 Tablet(s) Oral daily  oxyCODONE    IR 5 milliGRAM(s) Oral <User Schedule>  polyethylene glycol 3350 17 Gram(s) Oral at bedtime    MEDICATIONS  (PRN):  melatonin 5 milliGRAM(s) Oral at bedtime PRN Insomnia  oxyCODONE    IR 5 milliGRAM(s) Oral every 4 hours PRN Moderate Pain (4 - 6)      RECENT LABS/IMAGING                          8.3    5.40  )-----------( 286      ( 31 Mar 2025 07:30 )             25.9     03-31    139  |  103  |  13  ----------------------------<  92  4.3   |  26  |  0.6[L]    Ca    8.8      31 Mar 2025 07:30  Mg     2.0     03-31    TPro  5.6[L]  /  Alb  3.5  /  TBili  0.5  /  DBili  x   /  AST  37  /  ALT  44[H]  /  AlkPhos  93  03-31      Urinalysis Basic - ( 31 Mar 2025 07:30 )    Color: x / Appearance: x / SG: x / pH: x  Gluc: 92 mg/dL / Ketone: x  / Bili: x / Urobili: x   Blood: x / Protein: x / Nitrite: x   Leuk Esterase: x / RBC: x / WBC x   Sq Epi: x / Non Sq Epi: x / Bacteria: x     Patient is a 70 y.o. old female who presents with a chief complaint of Rehab of gait abnormality and decline in function 2/2 L hip IT fx s/p ORIF 3/26/2025 (28 Mar 2025 14:30)      HPI:  71 yo F with PMH of mild dementia, anxiety, bilateral cataracts presented to the ED 3/24/2025 s/p fall. Son stated that the pt was walking down stairs and slipped on her socks while descending the final 3 steps and fell on her left side. No head trauma or LOC. In ED CT LLE showed acute, impacted left femoral intertrochanteric fracture. Patient was evaluated by orthopedics and is s/p L hip ORIF 3/24/2025. Postop course complicated by postop anemia and orthostatic hypotension s/p IVF, will continue to monitor in acute rehab.     Imaging:   XR Left Femur: Comminuted trochanteric fracture.  CTH negative  CT Cspine negative  CTAP: Acute, impacted left femoral intertrochanteric fracture. Sacral Tarlov cysts.  CT LLE: Acute, impacted left femoral intertrochanteric fracture. Soft tissues unremarkable. No hip dislocation. Degenerative change of left hip and left knee.      Prior to admission, the patient was independent in ADLs and ambulation without an assistive device. Patient now requires assistance with ambulation and ADLs 2/2 to L Hip ORIF. There is a significant functional decline from baseline. To return home it is in the patient’s best interest to have acute inpatient rehabilitative services to undergo intensive interdisciplinary therapy. Furthermore, the patient is motivated and is able to tolerate up to 3 hours of daily therapy for 5-6 days a week for a total of 15 hours a week. Evaluated by Physiatry and deemed to be an excellent candidate for admission to  for acute inpatient rehab. Admitted to Acute Rehab on 3/27/2025.    Pt seen and examined by Physiatry and is currently functioning at maxA bed mobility, modA transfers, 20’ RW modA, UBD standy-by, LBD modA.    LS: lives with family in  with 5STE and 1F[employer] Inside  PLOF: independent in ADLs and ambulate with no assistive device     (28 Mar 2025 14:30)      I examined the patient and reviewed the chart. There have been no significant changes since the history and physical except where documented below.    TODAY'S SUBJECTIVE & REVIEW OF SYMPTOMS  [place]  ID [id number]  Patient seen and examined this morning.   No overnight events.   Reported mild itching around buttocks; on exam- rashes around buttocks; will give topical hydrocortisone ointment.  Also, patient is still orthostatic; will increase midodrine from 2.5 mg to 5 mg bid  Tolerating PT/OT and oral intake well.   Voiding bowel/bladder spontaneously and regularly.  Vital signs reviewed.        PHYSICAL EXAM    Vital Signs (24 Hrs):  T(C): 36.6 (04-02-25 @ 04:30), Max: 36.9 (04-01-25 @ 11:24)  HR: 63 (04-02-25 @ 04:30) (63 - 77)  BP: 106/66 (04-02-25 @ 04:30) (94/59 - 106/66)  RR: 18 (04-02-25 @ 04:30) (18 - 18)  SpO2: 98% (04-02-25 @ 04:30) (96% - 98%)  Wt(kg): --  Daily     Daily     I&O's Summary      General:[x   ] NAD, Resting Comfortable,   [   ] other:                                HEENT: [x   ] NC/AT, EOMI, PERRL , Normal Conjunctivae,   [   ] other:  Cardio: [  x ] RRR, no murmur,   [   ] other:                              Pulm: [ x  ] No Respiratory Distress,  Lungs CTAB,   [   ] other:                       Abdomen: [x   ]ND/NT, Soft,   [   ] other:    : [ x  ] NO ELIZALDE CATHETER, [   ] ELIZALDE CATHETER- no meatal tear, no discharge, [   ] other:                                            MSK: [   ] No joint swelling, Full ROM,   [  x ] other: L thigh/hip swelling, L hip TTP, L hip ecchymosis                                         Ext: [ x  ]No C/C/E, No calf tenderness,   [   ]other:    Skin: [   ]intact,   [  x ] other: surgical dressing c/d/i                                                                   Neurological Examination:  Cognitive: [    ] AAO x 3,   [x    ]  other: not oriented to year, she knows the year. She is  pleasant and able to give her history. She follows simple commands  well.                                                                Attention:  [ x   ] intact,   [    ]  other:                            Mood/Affect: [   x ] wnl,    [    ]  other:                                                                             CN II - XII:  [  x  ] intact,  [    ] other:                                                                                        Motor:   RIGHT UE: [ x  ] WNL,  [   ] other:  LEFT    UE: [ x  ] WNL,  [   ] other:  RIGHT LE: [ x  ] WNL,  [   ] other:   LEFT    LE: [   ] WNL,  [  x ] other: HF 1+/5, KE/KF 3-/5, PF/DF 5/5 limited 2/2 pain    DTRs: [ x  ]symmetric, [   ] other:                                                                    Sensory: [  x  ] Intact to light touch,   [    ] other:    MEDICATIONS  (STANDING):  acetaminophen     Tablet .. 975 milliGRAM(s) Oral every 8 hours  celecoxib 100 milliGRAM(s) Oral <User Schedule>  cholecalciferol 2000 Unit(s) Oral daily  donepezil 10 milliGRAM(s) Oral at bedtime  enoxaparin Injectable 40 milliGRAM(s) SubCutaneous every 24 hours  escitalopram 5 milliGRAM(s) Oral daily  lidocaine   4% Patch 1 Patch Transdermal daily  midodrine. 2.5 milliGRAM(s) Oral <User Schedule>  multivitamin 1 Tablet(s) Oral daily  oxyCODONE    IR 5 milliGRAM(s) Oral <User Schedule>  polyethylene glycol 3350 17 Gram(s) Oral at bedtime    MEDICATIONS  (PRN):  melatonin 5 milliGRAM(s) Oral at bedtime PRN Insomnia  oxyCODONE    IR 5 milliGRAM(s) Oral every 4 hours PRN Moderate Pain (4 - 6)      RECENT LABS/IMAGING                          8.3    5.40  )-----------( 286      ( 31 Mar 2025 07:30 )             25.9     03-31    139  |  103  |  13  ----------------------------<  92  4.3   |  26  |  0.6[L]    Ca    8.8      31 Mar 2025 07:30  Mg     2.0     03-31    TPro  5.6[L]  /  Alb  3.5  /  TBili  0.5  /  DBili  x   /  AST  37  /  ALT  44[H]  /  AlkPhos  93  03-31      Urinalysis Basic - ( 31 Mar 2025 07:30 )    Color: x / Appearance: x / SG: x / pH: x  Gluc: 92 mg/dL / Ketone: x  / Bili: x / Urobili: x   Blood: x / Protein: x / Nitrite: x   Leuk Esterase: x / RBC: x / WBC x   Sq Epi: x / Non Sq Epi: x / Bacteria: x     Patient is a 70 y.o. old female who presents with a chief complaint of Rehab of gait abnormality and decline in function 2/2 L hip IT fx s/p ORIF 3/26/2025 (28 Mar 2025 14:30)      HPI:  69 yo F with PMH of mild dementia, anxiety, bilateral cataracts presented to the ED 3/24/2025 s/p fall. Son stated that the pt was walking down stairs and slipped on her socks while descending the final 3 steps and fell on her left side. No head trauma or LOC. In ED CT LLE showed acute, impacted left femoral intertrochanteric fracture. Patient was evaluated by orthopedics and is s/p L hip ORIF 3/24/2025. Postop course complicated by postop anemia and orthostatic hypotension s/p IVF, will continue to monitor in acute rehab.     Imaging:   XR Left Femur: Comminuted trochanteric fracture.  CTH negative  CT Cspine negative  CTAP: Acute, impacted left femoral intertrochanteric fracture. Sacral Tarlov cysts.  CT LLE: Acute, impacted left femoral intertrochanteric fracture. Soft tissues unremarkable. No hip dislocation. Degenerative change of left hip and left knee.      Prior to admission, the patient was independent in ADLs and ambulation without an assistive device. Patient now requires assistance with ambulation and ADLs 2/2 to L Hip ORIF. There is a significant functional decline from baseline. To return home it is in the patient’s best interest to have acute inpatient rehabilitative services to undergo intensive interdisciplinary therapy. Furthermore, the patient is motivated and is able to tolerate up to 3 hours of daily therapy for 5-6 days a week for a total of 15 hours a week. Evaluated by Physiatry and deemed to be an excellent candidate for admission to  for acute inpatient rehab. Admitted to Acute Rehab on 3/27/2025.    Pt seen and examined by Physiatry and is currently functioning at maxA bed mobility, modA transfers, 20’ RW modA, UBD standy-by, LBD modA.    LS: lives with family in  with 5[employer] and 1F[employer] Inside  PLOF: independent in ADLs and ambulate with no assistive device     (28 Mar 2025 14:30)      I examined the patient and reviewed the chart. There have been no significant changes since the history and physical except where documented below.    TODAY'S SUBJECTIVE & REVIEW OF SYMPTOMS  [place]  ID [id number]  Patient seen and examined this morning.   No overnight events.   Reported mild itching around buttocks; on exam- rashes around buttocks; will give topical hydrocortisone ointment.  Also, patient is still orthostatic; will increase midodrine from 2.5 mg to 5 mg bid and give  cc bolus  Tolerating PT/OT and oral intake well.   Voiding bowel/bladder spontaneously and regularly.  Vital signs reviewed.        PHYSICAL EXAM    Vital Signs (24 Hrs):  T(C): 36.6 (04-02-25 @ 04:30), Max: 36.9 (04-01-25 @ 11:24)  HR: 63 (04-02-25 @ 04:30) (63 - 77)  BP: 106/66 (04-02-25 @ 04:30) (94/59 - 106/66)  RR: 18 (04-02-25 @ 04:30) (18 - 18)  SpO2: 98% (04-02-25 @ 04:30) (96% - 98%)  Wt(kg): --  Daily     Daily     I&O's Summary      General:[x   ] NAD, Resting Comfortable,   [   ] other:                                HEENT: [x   ] NC/AT, EOMI, PERRL , Normal Conjunctivae,   [   ] other:  Cardio: [  x ] RRR, no murmur,   [   ] other:                              Pulm: [ x  ] No Respiratory Distress,  Lungs CTAB,   [   ] other:                       Abdomen: [x   ]ND/NT, Soft,   [   ] other:    : [ x  ] NO ELIZALDE CATHETER, [   ] ELIZALDE CATHETER- no meatal tear, no discharge, [   ] other:                                            MSK: [   ] No joint swelling, Full ROM,   [  x ] other: L thigh/hip swelling, L hip TTP, L hip ecchymosis                                         Ext: [ x  ]No C/C/E, No calf tenderness,   [   ]other:    Skin: [   ]intact,   [  x ] other: surgical dressing c/d/i                                                                   Neurological Examination:  Cognitive: [    ] AAO x 3,   [x    ]  other: not oriented to year, she knows the year. She is  pleasant and able to give her history. She follows simple commands  well.                                                                Attention:  [ x   ] intact,   [    ]  other:                            Mood/Affect: [   x ] wnl,    [    ]  other:                                                                             CN II - XII:  [  x  ] intact,  [    ] other:                                                                                        Motor:   RIGHT UE: [ x  ] WNL,  [   ] other:  LEFT    UE: [ x  ] WNL,  [   ] other:  RIGHT LE: [ x  ] WNL,  [   ] other:   LEFT    LE: [   ] WNL,  [  x ] other: HF 1+/5, KE/KF 3-/5, PF/DF 5/5 limited 2/2 pain    DTRs: [ x  ]symmetric, [   ] other:                                                                    Sensory: [  x  ] Intact to light touch,   [    ] other:    MEDICATIONS  (STANDING):  acetaminophen     Tablet .. 975 milliGRAM(s) Oral every 8 hours  celecoxib 100 milliGRAM(s) Oral <User Schedule>  cholecalciferol 2000 Unit(s) Oral daily  donepezil 10 milliGRAM(s) Oral at bedtime  enoxaparin Injectable 40 milliGRAM(s) SubCutaneous every 24 hours  escitalopram 5 milliGRAM(s) Oral daily  lidocaine   4% Patch 1 Patch Transdermal daily  midodrine. 2.5 milliGRAM(s) Oral <User Schedule>  multivitamin 1 Tablet(s) Oral daily  oxyCODONE    IR 5 milliGRAM(s) Oral <User Schedule>  polyethylene glycol 3350 17 Gram(s) Oral at bedtime    MEDICATIONS  (PRN):  melatonin 5 milliGRAM(s) Oral at bedtime PRN Insomnia  oxyCODONE    IR 5 milliGRAM(s) Oral every 4 hours PRN Moderate Pain (4 - 6)      RECENT LABS/IMAGING                          8.3    5.40  )-----------( 286      ( 31 Mar 2025 07:30 )             25.9     03-31    139  |  103  |  13  ----------------------------<  92  4.3   |  26  |  0.6[L]    Ca    8.8      31 Mar 2025 07:30  Mg     2.0     03-31    TPro  5.6[L]  /  Alb  3.5  /  TBili  0.5  /  DBili  x   /  AST  37  /  ALT  44[H]  /  AlkPhos  93  03-31      Urinalysis Basic - ( 31 Mar 2025 07:30 )    Color: x / Appearance: x / SG: x / pH: x  Gluc: 92 mg/dL / Ketone: x  / Bili: x / Urobili: x   Blood: x / Protein: x / Nitrite: x   Leuk Esterase: x / RBC: x / WBC x   Sq Epi: x / Non Sq Epi: x / Bacteria: x

## 2025-04-02 NOTE — PROGRESS NOTE ADULT - ATTENDING COMMENTS
Patient seen and examined with the resident. We discussed the case. I have directed the care. I edited the note. The patient requires acute rehab with 3 hours of daily therapies at least 5 out of 7 days and close physiatry follow up. I participated in the rehab interdisciplinary team meeting; the patient progressed to ambulate 30 ft RW partial assist. I increased the midodrine to 5 mg po BID for persisting orthostasis. I added hydrocortisone cream 1% BID for her buttock rash/contact dermatitis. A NS fluid bolus was ordered for orthostasis.  #Rehab of gait abnormality and decline in function 2/2 L hip IT fx s/p ORIF 3/26/2025  -CT LLE: Acute, impacted left femoral intertrochanteric fracture.  -c/w Tylenol 975 mg q8h ATC, celebrex 100 mg q8AM, and oxycodone 5 mg  q4h prn for pain, oxycodone 5 mg po q8am   -WBAT on the LLE  -PT/OT Eval and treat: Patient requires 3 hrs daily of interdisciplinary inpatient rehab at least 5 days a week.     #Post op anemia, left thigh ecchymosis  -Hgb 11.9 on admission   -Hgb 8.3 3/31  -Monitor CBC, transfuse <7    #Orthostatic hypotension  Close monitoring of her vitals  500 cc NS fluid bolus 3/29  1L NS 3/30  500cc NS fluid bolus 3/31  - encouraged oral fluid intake  - increase midodrine 2.5 mg bid to 5 mg bid(7:30 am and 12:30 pm) (4/2)      #MSK Sternal and rib pain, TTP  Lidocaine patch qd  Monitor    #Dementia, Mild  She benefit from and tolerate 3 hrs of daily intensive therapies.   -C/w home med donepezil 10 mg qhs    #Anxiety/depression  -c/w home med escitalopram 5 mg daily    #Contact dermatitis (4/2)  involving buttocks  - apply hydrocortisone ointment bid     -Pain control: Tylenol 975 mg q8h, Celebrex 100 mg daily at 8 am;  oxycodone 5 mg po q4h prn, oxycodone 5 mg po daily at 8 a.m and 12:30 pm    -GI/Bowel Mgmt: senna/Miralax Patient seen and examined with the resident. We discussed the case. I have directed the care. I edited the note. The patient requires acute rehab with 3 hours of daily therapies at least 5 out of 7 days and close physiatry follow up. I participated in the rehab interdisciplinary team meeting; the patient progressed to ambulate 30 ft RW partial assist. I increased the midodrine to 5 mg po BID for persisting orthostasis. I added hydrocortisone cream 1% BID for her buttock rash/contact dermatitis. A NS fluid bolus was ordered for orthostasis.  #Rehab of gait abnormality and decline in function 2/2 L hip IT fx s/p ORIF 3/26/2025  -CT LLE: Acute, impacted left femoral intertrochanteric fracture.  -c/w Tylenol 975 mg q8h ATC, celebrex 100 mg q8AM, and oxycodone 5 mg  q4h prn for pain, oxycodone 5 mg po q8am   -WBAT on the LLE  -PT/OT Eval and treat: Patient requires 3 hrs daily of interdisciplinary inpatient rehab at least 5 days a week.     #Post op anemia, left thigh ecchymosis  -Hgb 11.9 on admission   -Hgb 8.3 3/31  -Monitor CBC, transfuse <7    #Orthostatic hypotension  Close monitoring of her vitals  500 cc NS fluid bolus 3/29  1L NS 3/30  500cc NS fluid bolus 3/31  - encouraged oral fluid intake  -  cc bolus (4/2)  - increase midodrine 2.5 mg bid to 5 mg bid(7:30 am and 12:30 pm) (4/2)      #MSK Sternal and rib pain, TTP  Lidocaine patch qd  Monitor    #Dementia, Mild  She benefit from and tolerate 3 hrs of daily intensive therapies.   -C/w home med donepezil 10 mg qhs    #Anxiety/depression  -c/w home med escitalopram 5 mg daily    #Contact dermatitis (4/2)  involving buttocks  - apply hydrocortisone ointment bid     -Pain control: Tylenol 975 mg q8h, Celebrex 100 mg daily at 8 am;  oxycodone 5 mg po q4h prn, oxycodone 5 mg po daily at 8 a.m and 12:30 pm    -GI/Bowel Mgmt: senna/Miralax     - Diet: Regular, Halal

## 2025-04-03 RX ORDER — CELECOXIB 50 MG/1
100 CAPSULE ORAL EVERY 12 HOURS
Refills: 0 | Status: DISCONTINUED | OUTPATIENT
Start: 2025-04-03 | End: 2025-04-14

## 2025-04-03 RX ORDER — HYPROMELLOSE 0.4 %
1 DROPS OPHTHALMIC (EYE) EVERY 6 HOURS
Refills: 0 | Status: DISCONTINUED | OUTPATIENT
Start: 2025-04-03 | End: 2025-04-05

## 2025-04-03 RX ADMIN — MIDODRINE HYDROCHLORIDE 5 MILLIGRAM(S): 5 TABLET ORAL at 08:42

## 2025-04-03 RX ADMIN — CELECOXIB 100 MILLIGRAM(S): 50 CAPSULE ORAL at 17:49

## 2025-04-03 RX ADMIN — CELECOXIB 100 MILLIGRAM(S): 50 CAPSULE ORAL at 08:42

## 2025-04-03 RX ADMIN — HYDROCORTISONE 1 APPLICATION(S): 10 CREAM TOPICAL at 17:49

## 2025-04-03 RX ADMIN — LIDOCAINE HYDROCHLORIDE 1 PATCH: 20 JELLY TOPICAL at 11:55

## 2025-04-03 RX ADMIN — CELECOXIB 100 MILLIGRAM(S): 50 CAPSULE ORAL at 18:22

## 2025-04-03 RX ADMIN — OXYCODONE HYDROCHLORIDE 5 MILLIGRAM(S): 30 TABLET ORAL at 08:42

## 2025-04-03 RX ADMIN — OXYCODONE HYDROCHLORIDE 5 MILLIGRAM(S): 30 TABLET ORAL at 16:44

## 2025-04-03 RX ADMIN — OXYCODONE HYDROCHLORIDE 5 MILLIGRAM(S): 30 TABLET ORAL at 11:58

## 2025-04-03 RX ADMIN — Medication 975 MILLIGRAM(S): at 21:17

## 2025-04-03 RX ADMIN — DONEPEZIL HYDROCHLORIDE 10 MILLIGRAM(S): 5 TABLET ORAL at 21:17

## 2025-04-03 RX ADMIN — Medication 2000 UNIT(S): at 11:43

## 2025-04-03 RX ADMIN — OXYCODONE HYDROCHLORIDE 5 MILLIGRAM(S): 30 TABLET ORAL at 11:54

## 2025-04-03 RX ADMIN — HYDROCORTISONE 1 APPLICATION(S): 10 CREAM TOPICAL at 05:24

## 2025-04-03 RX ADMIN — Medication 1 TABLET(S): at 11:45

## 2025-04-03 RX ADMIN — Medication 975 MILLIGRAM(S): at 16:47

## 2025-04-03 RX ADMIN — Medication 975 MILLIGRAM(S): at 05:21

## 2025-04-03 RX ADMIN — ESCITALOPRAM OXALATE 5 MILLIGRAM(S): 20 TABLET ORAL at 11:53

## 2025-04-03 RX ADMIN — Medication 975 MILLIGRAM(S): at 21:47

## 2025-04-03 RX ADMIN — ENOXAPARIN SODIUM 40 MILLIGRAM(S): 100 INJECTION SUBCUTANEOUS at 21:18

## 2025-04-03 RX ADMIN — MIDODRINE HYDROCHLORIDE 5 MILLIGRAM(S): 5 TABLET ORAL at 11:50

## 2025-04-03 RX ADMIN — CELECOXIB 100 MILLIGRAM(S): 50 CAPSULE ORAL at 11:42

## 2025-04-03 RX ADMIN — Medication 975 MILLIGRAM(S): at 14:00

## 2025-04-03 NOTE — PROGRESS NOTE ADULT - ASSESSMENT
ASSESSMENT/PLAN    70 y.o. F with pmhx of mild dementia, anxiety bilateral cataracts presenting for mechanism fall with Left intertrochanteric hip s/p ORIF 3/24/2025. Postop coarse complicated by orthostatic hypotension postop anemia. Her hip fracture is quite painful.      #Rehab of gait abnormality and decline in function 2/2 L hip IT fx s/p ORIF 3/26/2025  -CT LLE: Acute, impacted left femoral intertrochanteric fracture.  -c/w Tylenol 975 mg q8h ATC, celebrex 100 mg q8AM, and oxycodone 5 mg  q4h prn for pain, oxycodone 5 mg po q8am   -WBAT on the LLE  -PT/OT Eval and treat: Patient requires 3 hrs daily of interdisciplinary inpatient rehab at least 5 days a week.     #Post op anemia, left thigh ecchymosis  -Hgb 11.9 on admission   -Hgb 8.3 3/31  -Monitor CBC, transfuse <7    #Orthostatic hypotension  Close monitoring of her vitals  500 cc NS fluid bolus 3/29  1L NS 3/30  500cc NS fluid bolus 3/31  - encouraged oral fluid intake  -  cc bolus (4/2)  - increase midodrine 2.5 mg bid to 5 mg bid(7:30 am and 12:30 pm) (4/2)      #MSK Sternal and rib pain, TTP  Lidocaine patch qd  Monitor    #Dementia, Mild  She benefit from and tolerate 3 hrs of daily intensive therapies.   -C/w home med donepezil 10 mg qhs    #Anxiety/depression  -c/w home med escitalopram 5 mg daily    #Contact dermatitis (4/2)  involving buttocks  - apply hydrocortisone ointment bid     -Pain control: Tylenol 975 mg q8h, Celebrex 100 mg daily at 8 am;  oxycodone 5 mg po q4h prn, oxycodone 5 mg po daily at 8 a.m and 12:30 pm    -GI/Bowel Mgmt: senna/Miralax     - Diet: Regular, Halal      Precautions / PROPHYLAXIS:      - Falls    - Ortho: Weight bearing status: WBAT LLE      - DVT prophylaxis: Lovenox

## 2025-04-03 NOTE — PROGRESS NOTE ADULT - ATTENDING COMMENTS
Patient seen and examined with the resident. We discussed the case. I have directed the care. I edited the note. The patient requires acute rehab with 3 hours of daily therapies at least 5 out of 7 days and close physiatry follow up. I added drops for dry eyes.  #Rehab of gait abnormality and decline in function 2/2 L hip IT fx s/p ORIF 3/26/2025  -CT LLE: Acute, impacted left femoral intertrochanteric fracture.  -c/w Tylenol 975 mg q8h ATC, celebrex 100 mg q8AM, and oxycodone 5 mg  q4h prn for pain, oxycodone 5 mg po q8am   -WBAT on the LLE  -PT/OT Eval and treat: Patient requires 3 hrs daily of interdisciplinary inpatient rehab at least 5 days a week.     #Post op anemia, left thigh ecchymosis  -Hgb 11.9 on admission   -Hgb 8.3 3/31  -Monitor CBC, transfuse <7    #Orthostatic hypotension  Close monitoring of her vitals  500 cc NS fluid bolus 3/29  1L NS 3/30  500cc NS fluid bolus 3/31  - encouraged oral fluid intake  -  cc bolus (4/2)  - increase midodrine 2.5 mg bid to 5 mg bid(7:30 am and 12:30 pm) (4/2)      #MSK Sternal and rib pain, TTP  Lidocaine patch qd  Monitor    #Dementia, Mild  She benefit from and tolerate 3 hrs of daily intensive therapies.   -C/w home med donepezil 10 mg qhs    #Anxiety/depression  -c/w home med escitalopram 5 mg daily    #Contact dermatitis (4/2)  involving buttocks  - apply hydrocortisone ointment bid

## 2025-04-03 NOTE — PROGRESS NOTE ADULT - SUBJECTIVE AND OBJECTIVE BOX
Patient is a 70 y.o. old female who presents with a chief complaint of Rehab of gait abnormality and decline in function 2/2 L hip IT fx s/p ORIF 3/26/2025 (28 Mar 2025 14:30)      HPI:  69 yo F with PMH of mild dementia, anxiety, bilateral cataracts presented to the ED 3/24/2025 s/p fall. Son stated that the pt was walking down stairs and slipped on her socks while descending the final 3 steps and fell on her left side. No head trauma or LOC. In ED CT LLE showed acute, impacted left femoral intertrochanteric fracture. Patient was evaluated by orthopedics and is s/p L hip ORIF 3/24/2025. Postop course complicated by postop anemia and orthostatic hypotension s/p IVF, will continue to monitor in acute rehab.     Imaging:   XR Left Femur: Comminuted trochanteric fracture.  CTH negative  CT Cspine negative  CTAP: Acute, impacted left femoral intertrochanteric fracture. Sacral Tarlov cysts.  CT LLE: Acute, impacted left femoral intertrochanteric fracture. Soft tissues unremarkable. No hip dislocation. Degenerative change of left hip and left knee.      Prior to admission, the patient was independent in ADLs and ambulation without an assistive device. Patient now requires assistance with ambulation and ADLs 2/2 to L Hip ORIF. There is a significant functional decline from baseline. To return home it is in the patient’s best interest to have acute inpatient rehabilitative services to undergo intensive interdisciplinary therapy. Furthermore, the patient is motivated and is able to tolerate up to 3 hours of daily therapy for 5-6 days a week for a total of 15 hours a week. Evaluated by Physiatry and deemed to be an excellent candidate for admission to  for acute inpatient rehab. Admitted to Acute Rehab on 3/27/2025.    Pt seen and examined by Physiatry and is currently functioning at maxA bed mobility, modA transfers, 20’ RW modA, UBD standy-by, LBD modA.    LS: lives with family in  with 5STE and 1FOS Inside  PLOF: independent in ADLs and ambulate with no assistive device     (28 Mar 2025 14:30)      I examined the patient and reviewed the chart. There have been no significant changes since the history and physical except where documented below.    TODAY'S SUBJECTIVE & REVIEW OF SYMPTOMS  [place]vian    Patient seen and examined this morning.   No overnight events.   Tolerating PT/OT and oral intake well.   Voiding bowel/bladder spontaneously and regularly.  Vital signs reviewed.        PHYSICAL EXAM  Vital Signs (24 Hrs):  T(C): 36.6 (04-03-25 @ 05:36), Max: 36.7 (04-02-25 @ 12:30)  HR: 85 (04-03-25 @ 05:36) (67 - 85)  BP: 112/69 (04-03-25 @ 05:36) (98/63 - 114/73)  RR: 18 (04-03-25 @ 05:36) (18 - 18)  SpO2: 98% (04-03-25 @ 05:36) (97% - 98%)  Wt(kg): --  Daily     Daily     I&O's Summary      General:[x   ] NAD, Resting Comfortable,   [   ] other:                                HEENT: [x   ] NC/AT, EOMI, PERRL , Normal Conjunctivae,   [   ] other:  Cardio: [  x ] RRR, no murmur,   [   ] other:                              Pulm: [ x  ] No Respiratory Distress,  Lungs CTAB,   [   ] other:                       Abdomen: [x   ]ND/NT, Soft,   [   ] other:    : [ x  ] NO ELIZALDE CATHETER, [   ] ELIZALDE CATHETER- no meatal tear, no discharge, [   ] other:                                            MSK: [   ] No joint swelling, Full ROM,   [  x ] other: L thigh/hip swelling, L hip TTP, L hip ecchymosis                                         Ext: [ x  ]No C/C/E, No calf tenderness,   [   ]other:    Skin: [   ]intact,   [  x ] other: surgical dressing c/d/i                                                                   Neurological Examination:  Cognitive: [    ] AAO x 3,   [x    ]  other: not oriented to year, she knows the year. She is  pleasant and able to give her history. She follows simple commands  well.                                                                Attention:  [ x   ] intact,   [    ]  other:                            Mood/Affect: [   x ] wnl,    [    ]  other:                                                                             CN II - XII:  [  x  ] intact,  [    ] other:                                                                                        Motor:   RIGHT UE: [ x  ] WNL,  [   ] other:  LEFT    UE: [ x  ] WNL,  [   ] other:  RIGHT LE: [ x  ] WNL,  [   ] other:   LEFT    LE: [   ] WNL,  [  x ] other: HF 1+/5, KE/KF 3-/5, PF/DF 5/5 limited 2/2 pain    DTRs: [ x  ]symmetric, [   ] other:                                                                    Sensory: [  x  ] Intact to light touch,   [    ] other:    MEDICATIONS  (STANDING):  acetaminophen     Tablet .. 975 milliGRAM(s) Oral every 8 hours  celecoxib 100 milliGRAM(s) Oral <User Schedule>  cholecalciferol 2000 Unit(s) Oral daily  donepezil 10 milliGRAM(s) Oral at bedtime  enoxaparin Injectable 40 milliGRAM(s) SubCutaneous every 24 hours  escitalopram 5 milliGRAM(s) Oral daily  hydrocortisone 1% Cream 1 Application(s) Topical two times a day  lidocaine   4% Patch 1 Patch Transdermal daily  midodrine. 5 milliGRAM(s) Oral <User Schedule>  multivitamin 1 Tablet(s) Oral daily  oxyCODONE    IR 5 milliGRAM(s) Oral <User Schedule>  polyethylene glycol 3350 17 Gram(s) Oral at bedtime    MEDICATIONS  (PRN):  melatonin 5 milliGRAM(s) Oral at bedtime PRN Insomnia  oxyCODONE    IR 5 milliGRAM(s) Oral every 4 hours PRN Moderate Pain (4 - 6)    RECENT LABS/IMAGING                          8.3    5.40  )-----------( 286      ( 31 Mar 2025 07:30 )             25.9     03-31    139  |  103  |  13  ----------------------------<  92  4.3   |  26  |  0.6[L]    Ca    8.8      31 Mar 2025 07:30  Mg     2.0     03-31    TPro  5.6[L]  /  Alb  3.5  /  TBili  0.5  /  DBili  x   /  AST  37  /  ALT  44[H]  /  AlkPhos  93  03-31      Urinalysis Basic - ( 31 Mar 2025 07:30 )    Color: x / Appearance: x / SG: x / pH: x  Gluc: 92 mg/dL / Ketone: x  / Bili: x / Urobili: x   Blood: x / Protein: x / Nitrite: x   Leuk Esterase: x / RBC: x / WBC x   Sq Epi: x / Non Sq Epi: x / Bacteria: x     Patient is a 70 y.o. old female who presents with a chief complaint of Rehab of gait abnormality and decline in function 2/2 L hip IT fx s/p ORIF 3/26/2025 (28 Mar 2025 14:30)      HPI:  69 yo F with PMH of mild dementia, anxiety, bilateral cataracts presented to the ED 3/24/2025 s/p fall. Son stated that the pt was walking down stairs and slipped on her socks while descending the final 3 steps and fell on her left side. No head trauma or LOC. In ED CT LLE showed acute, impacted left femoral intertrochanteric fracture. Patient was evaluated by orthopedics and is s/p L hip ORIF 3/24/2025. Postop course complicated by postop anemia and orthostatic hypotension s/p IVF, will continue to monitor in acute rehab.     Imaging:   XR Left Femur: Comminuted trochanteric fracture.  CTH negative  CT Cspine negative  CTAP: Acute, impacted left femoral intertrochanteric fracture. Sacral Tarlov cysts.  CT LLE: Acute, impacted left femoral intertrochanteric fracture. Soft tissues unremarkable. No hip dislocation. Degenerative change of left hip and left knee.      Prior to admission, the patient was independent in ADLs and ambulation without an assistive device. Patient now requires assistance with ambulation and ADLs 2/2 to L Hip ORIF. There is a significant functional decline from baseline. To return home it is in the patient’s best interest to have acute inpatient rehabilitative services to undergo intensive interdisciplinary therapy. Furthermore, the patient is motivated and is able to tolerate up to 3 hours of daily therapy for 5-6 days a week for a total of 15 hours a week. Evaluated by Physiatry and deemed to be an excellent candidate for admission to  for acute inpatient rehab. Admitted to Acute Rehab on 3/27/2025.    Pt seen and examined by Physiatry and is currently functioning at maxA bed mobility, modA transfers, 20’ RW modA, UBD standy-by, LBD modA.    LS: lives with family in  with 5STE and 1FOS Inside  PLOF: independent in ADLs and ambulate with no assistive device     (28 Mar 2025 14:30)      I examined the patient and reviewed the chart. There have been no significant changes since the history and physical except where documented below.    TODAY'S SUBJECTIVE & REVIEW OF SYMPTOMS  [place]anish  ID [id number]  Patient seen and examined this morning.   No overnight events.   Patient reported dry eyes and mild headache this morning.  Patient will be receiving oxycodone schedule in am;  Will order artificial tears for dry eyes.  Tolerating PT/OT and oral intake well.   Voiding bowel/bladder spontaneously and regularly.  Vital signs reviewed.        PHYSICAL EXAM  Vital Signs (24 Hrs):  T(C): 36.6 (04-03-25 @ 05:36), Max: 36.7 (04-02-25 @ 12:30)  HR: 85 (04-03-25 @ 05:36) (67 - 85)  BP: 112/69 (04-03-25 @ 05:36) (98/63 - 114/73)  RR: 18 (04-03-25 @ 05:36) (18 - 18)  SpO2: 98% (04-03-25 @ 05:36) (97% - 98%)  Wt(kg): --  Daily     Daily     I&O's Summary      General:[x   ] NAD, Resting Comfortable,   [   ] other:                                HEENT: [x   ] NC/AT, EOMI, PERRL , Normal Conjunctivae,   [   ] other:  Cardio: [  x ] RRR, no murmur,   [   ] other:                              Pulm: [ x  ] No Respiratory Distress,  Lungs CTAB,   [   ] other:                       Abdomen: [x   ]ND/NT, Soft,   [   ] other:    : [ x  ] NO ELIZALDE CATHETER, [   ] ELIZALDE CATHETER- no meatal tear, no discharge, [   ] other:                                            MSK: [   ] No joint swelling, Full ROM,   [  x ] other: L thigh/hip swelling, L hip TTP, L hip ecchymosis                                         Ext: [ x  ]No C/C/E, No calf tenderness,   [   ]other:    Skin: [   ]intact,   [  x ] other: surgical dressing c/d/i                                                                   Neurological Examination:  Cognitive: [    ] AAO x 3,   [x    ]  other: not oriented to year, she knows the year. She is  pleasant and able to give her history. She follows simple commands  well.                                                                Attention:  [ x   ] intact,   [    ]  other:                            Mood/Affect: [   x ] wnl,    [    ]  other:                                                                             CN II - XII:  [  x  ] intact,  [    ] other:                                                                                        Motor:   RIGHT UE: [ x  ] WNL,  [   ] other:  LEFT    UE: [ x  ] WNL,  [   ] other:  RIGHT LE: [ x  ] WNL,  [   ] other:   LEFT    LE: [   ] WNL,  [  x ] other: HF 1+/5, KE/KF 3-/5, PF/DF 5/5 limited 2/2 pain    DTRs: [ x  ]symmetric, [   ] other:                                                                    Sensory: [  x  ] Intact to light touch,   [    ] other:    MEDICATIONS  (STANDING):  acetaminophen     Tablet .. 975 milliGRAM(s) Oral every 8 hours  celecoxib 100 milliGRAM(s) Oral <User Schedule>  cholecalciferol 2000 Unit(s) Oral daily  donepezil 10 milliGRAM(s) Oral at bedtime  enoxaparin Injectable 40 milliGRAM(s) SubCutaneous every 24 hours  escitalopram 5 milliGRAM(s) Oral daily  hydrocortisone 1% Cream 1 Application(s) Topical two times a day  lidocaine   4% Patch 1 Patch Transdermal daily  midodrine. 5 milliGRAM(s) Oral <User Schedule>  multivitamin 1 Tablet(s) Oral daily  oxyCODONE    IR 5 milliGRAM(s) Oral <User Schedule>  polyethylene glycol 3350 17 Gram(s) Oral at bedtime    MEDICATIONS  (PRN):  melatonin 5 milliGRAM(s) Oral at bedtime PRN Insomnia  oxyCODONE    IR 5 milliGRAM(s) Oral every 4 hours PRN Moderate Pain (4 - 6)    RECENT LABS/IMAGING                          8.3    5.40  )-----------( 286      ( 31 Mar 2025 07:30 )             25.9     03-31    139  |  103  |  13  ----------------------------<  92  4.3   |  26  |  0.6[L]    Ca    8.8      31 Mar 2025 07:30  Mg     2.0     03-31    TPro  5.6[L]  /  Alb  3.5  /  TBili  0.5  /  DBili  x   /  AST  37  /  ALT  44[H]  /  AlkPhos  93  03-31      Urinalysis Basic - ( 31 Mar 2025 07:30 )    Color: x / Appearance: x / SG: x / pH: x  Gluc: 92 mg/dL / Ketone: x  / Bili: x / Urobili: x   Blood: x / Protein: x / Nitrite: x   Leuk Esterase: x / RBC: x / WBC x   Sq Epi: x / Non Sq Epi: x / Bacteria: x

## 2025-04-03 NOTE — CHART NOTE - NSCHARTNOTEFT_GEN_A_CORE
Patient c/o reproducible chest wall pain , she is on celebrex 100  mg once  a  day . will increase  dose to q12h  from today .

## 2025-04-04 RX ORDER — ALBUTEROL SULFATE 2.5 MG/3ML
2 VIAL, NEBULIZER (ML) INHALATION EVERY 6 HOURS
Refills: 0 | Status: DISCONTINUED | OUTPATIENT
Start: 2025-04-04 | End: 2025-04-14

## 2025-04-04 RX ORDER — MONTELUKAST SODIUM 10 MG/1
10 TABLET ORAL DAILY
Refills: 0 | Status: DISCONTINUED | OUTPATIENT
Start: 2025-04-04 | End: 2025-04-14

## 2025-04-04 RX ORDER — HYDROCORTISONE 10 MG/G
1 CREAM TOPICAL
Refills: 0 | Status: DISCONTINUED | OUTPATIENT
Start: 2025-04-04 | End: 2025-04-14

## 2025-04-04 RX ORDER — DIPHENHYDRAMINE HCL 12.5MG/5ML
50 ELIXIR ORAL EVERY 6 HOURS
Refills: 0 | Status: DISCONTINUED | OUTPATIENT
Start: 2025-04-04 | End: 2025-04-09

## 2025-04-04 RX ORDER — ALBUTEROL SULFATE 2.5 MG/3ML
2 VIAL, NEBULIZER (ML) INHALATION ONCE
Refills: 0 | Status: COMPLETED | OUTPATIENT
Start: 2025-04-04 | End: 2025-04-04

## 2025-04-04 RX ADMIN — OXYCODONE HYDROCHLORIDE 5 MILLIGRAM(S): 30 TABLET ORAL at 11:46

## 2025-04-04 RX ADMIN — Medication 975 MILLIGRAM(S): at 14:43

## 2025-04-04 RX ADMIN — LIDOCAINE HYDROCHLORIDE 1 PATCH: 20 JELLY TOPICAL at 21:33

## 2025-04-04 RX ADMIN — CELECOXIB 100 MILLIGRAM(S): 50 CAPSULE ORAL at 05:48

## 2025-04-04 RX ADMIN — ESCITALOPRAM OXALATE 5 MILLIGRAM(S): 20 TABLET ORAL at 11:41

## 2025-04-04 RX ADMIN — Medication 975 MILLIGRAM(S): at 06:18

## 2025-04-04 RX ADMIN — Medication 1 APPLICATION(S): at 11:48

## 2025-04-04 RX ADMIN — MIDODRINE HYDROCHLORIDE 5 MILLIGRAM(S): 5 TABLET ORAL at 11:42

## 2025-04-04 RX ADMIN — CELECOXIB 100 MILLIGRAM(S): 50 CAPSULE ORAL at 17:36

## 2025-04-04 RX ADMIN — Medication 975 MILLIGRAM(S): at 21:39

## 2025-04-04 RX ADMIN — HYDROCORTISONE 1 APPLICATION(S): 10 CREAM TOPICAL at 05:47

## 2025-04-04 RX ADMIN — POLYETHYLENE GLYCOL 3350 17 GRAM(S): 17 POWDER, FOR SOLUTION ORAL at 21:39

## 2025-04-04 RX ADMIN — OXYCODONE HYDROCHLORIDE 5 MILLIGRAM(S): 30 TABLET ORAL at 08:42

## 2025-04-04 RX ADMIN — Medication 975 MILLIGRAM(S): at 05:48

## 2025-04-04 RX ADMIN — LIDOCAINE HYDROCHLORIDE 1 PATCH: 20 JELLY TOPICAL at 11:55

## 2025-04-04 RX ADMIN — ENOXAPARIN SODIUM 40 MILLIGRAM(S): 100 INJECTION SUBCUTANEOUS at 21:39

## 2025-04-04 RX ADMIN — Medication 2 PUFF(S): at 18:04

## 2025-04-04 RX ADMIN — HYDROCORTISONE 1 APPLICATION(S): 10 CREAM TOPICAL at 17:37

## 2025-04-04 RX ADMIN — Medication 1 TABLET(S): at 11:43

## 2025-04-04 RX ADMIN — MONTELUKAST SODIUM 10 MILLIGRAM(S): 10 TABLET ORAL at 18:03

## 2025-04-04 RX ADMIN — CELECOXIB 100 MILLIGRAM(S): 50 CAPSULE ORAL at 06:18

## 2025-04-04 RX ADMIN — DONEPEZIL HYDROCHLORIDE 10 MILLIGRAM(S): 5 TABLET ORAL at 21:39

## 2025-04-04 RX ADMIN — MIDODRINE HYDROCHLORIDE 5 MILLIGRAM(S): 5 TABLET ORAL at 08:42

## 2025-04-04 RX ADMIN — Medication 2000 UNIT(S): at 11:42

## 2025-04-04 NOTE — PROGRESS NOTE ADULT - ATTENDING COMMENTS
Patient seen and examined with the resident. We discussed the case. I have directed the care. I edited the note. The patient requires acute rehab with 3 hours of daily therapies at least 5 out of 7 days and close physiatry follow up.  #Rehab of gait abnormality and decline in function 2/2 L hip IT fx s/p ORIF 3/26/2025  -CT LLE: Acute, impacted left femoral intertrochanteric fracture.  -c/w Tylenol 975 mg q8h ATC, celebrex 100 mg q8AM, and oxycodone 5 mg  q4h prn for pain, oxycodone 5 mg po q8am   -WBAT on the LLE  -PT/OT Eval and treat: Patient requires 3 hrs daily of interdisciplinary inpatient rehab at least 5 days a week.     #Post op anemia, left thigh ecchymosis  -Hgb 11.9 on admission   -Hgb 8.3 3/31  -Monitor CBC, transfuse <7    #Orthostatic hypotension  Close monitoring of her vitals  500 cc NS fluid bolus 3/29  1L NS 3/30  500cc NS fluid bolus 3/31  - encouraged oral fluid intake  -  cc bolus (4/2)  - increase midodrine 2.5 mg bid to 5 mg bid(7:30 am and 12:30 pm) (4/2)      #MSK Sternal and rib pain, TTP  Lidocaine patch qd  Monitor    #Dementia, Mild  She benefit from and tolerate 3 hrs of daily intensive therapies.   -C/w home med donepezil 10 mg qhs    #Anxiety/depression  -c/w home med escitalopram 5 mg daily    #Contact dermatitis (4/2)  involving buttocks  - apply hydrocortisone ointment bid     -Pain control: Tylenol 975 mg q8h, Celebrex 100 mg daily at 8 am;  oxycodone 5 mg po q4h prn, oxycodone 5 mg po daily at 8 a.m and 12:30 pm    -GI/Bowel Mgmt: senna/Miralax     - Diet: Regular, Halal

## 2025-04-04 NOTE — PROGRESS NOTE ADULT - SUBJECTIVE AND OBJECTIVE BOX
Patient is a 70 y.o. old female who presents with a chief complaint of Rehab of gait abnormality and decline in function 2/2 L hip IT fx s/p ORIF 3/26/2025 (28 Mar 2025 14:30)      HPI:  71 yo F with PMH of mild dementia, anxiety, bilateral cataracts presented to the ED 3/24/2025 s/p fall. Son stated that the pt was walking down stairs and slipped on her socks while descending the final 3 steps and fell on her left side. No head trauma or LOC. In ED CT LLE showed acute, impacted left femoral intertrochanteric fracture. Patient was evaluated by orthopedics and is s/p L hip ORIF 3/24/2025. Postop course complicated by postop anemia and orthostatic hypotension s/p IVF, will continue to monitor in acute rehab.     Imaging:   XR Left Femur: Comminuted trochanteric fracture.  CTH negative  CT Cspine negative  CTAP: Acute, impacted left femoral intertrochanteric fracture. Sacral Tarlov cysts.  CT LLE: Acute, impacted left femoral intertrochanteric fracture. Soft tissues unremarkable. No hip dislocation. Degenerative change of left hip and left knee.      Prior to admission, the patient was independent in ADLs and ambulation without an assistive device. Patient now requires assistance with ambulation and ADLs 2/2 to L Hip ORIF. There is a significant functional decline from baseline. To return home it is in the patient’s best interest to have acute inpatient rehabilitative services to undergo intensive interdisciplinary therapy. Furthermore, the patient is motivated and is able to tolerate up to 3 hours of daily therapy for 5-6 days a week for a total of 15 hours a week. Evaluated by Physiatry and deemed to be an excellent candidate for admission to  for acute inpatient rehab. Admitted to Acute Rehab on 3/27/2025.    Pt seen and examined by Physiatry and is currently functioning at maxA bed mobility, modA transfers, 20’ RW modA, UBD standy-by, LBD modA.    LS: lives with family in  with 5STE and 1FOS Inside  PLOF: independent in ADLs and ambulate with no assistive device     (28 Mar 2025 14:30)      I examined the patient and reviewed the chart. There have been no significant changes since the history and physical except where documented below.    TODAY'S SUBJECTIVE & REVIEW OF SYMPTOMS  Syriac  ID [id number]  Patient seen and examined this morning.   No overnight events.   Patient reported dry eyes and mild headache this morning.  Patient will be receiving oxycodone schedule in am;  Will order artificial tears for dry eyes.  Tolerating PT/OT and oral intake well.   Voiding bowel/bladder spontaneously and regularly.  Vital signs reviewed.        PHYSICAL EXAM    Vital Signs (24 Hrs):  T(C): 36.6 (04-04-25 @ 04:30), Max: 36.9 (04-03-25 @ 21:45)  HR: 61 (04-04-25 @ 04:30) (61 - 99)  BP: 103/64 (04-04-25 @ 04:30) (98/55 - 128/80)  RR: 18 (04-04-25 @ 04:30) (18 - 18)  SpO2: 97% (04-04-25 @ 04:30) (96% - 97%)  Wt(kg): --  Daily     Daily     I&O's Summary        General:[x   ] NAD, Resting Comfortable,   [   ] other:                                HEENT: [x   ] NC/AT, EOMI, PERRL , Normal Conjunctivae,   [   ] other:  Cardio: [  x ] RRR, no murmur,   [   ] other:                              Pulm: [ x  ] No Respiratory Distress,  Lungs CTAB,   [   ] other:                       Abdomen: [x   ]ND/NT, Soft,   [   ] other:    : [ x  ] NO ELIZALDE CATHETER, [   ] ELIZALDE CATHETER- no meatal tear, no discharge, [   ] other:                                            MSK: [   ] No joint swelling, Full ROM,   [  x ] other: L thigh/hip swelling, L hip TTP, L hip ecchymosis                                         Ext: [ x  ]No C/C/E, No calf tenderness,   [   ]other:    Skin: [   ]intact,   [  x ] other: surgical dressing c/d/i                                                                   Neurological Examination:  Cognitive: [    ] AAO x 3,   [x    ]  other: not oriented to year, she knows the year. She is  pleasant and able to give her history. She follows simple commands  well.                                                                Attention:  [ x   ] intact,   [    ]  other:                            Mood/Affect: [   x ] wnl,    [    ]  other:                                                                             CN II - XII:  [  x  ] intact,  [    ] other:                                                                                        Motor:   RIGHT UE: [ x  ] WNL,  [   ] other:  LEFT    UE: [ x  ] WNL,  [   ] other:  RIGHT LE: [ x  ] WNL,  [   ] other:   LEFT    LE: [   ] WNL,  [  x ] other: HF 1+/5, KE/KF 3-/5, PF/DF 5/5 limited 2/2 pain    DTRs: [ x  ]symmetric, [   ] other:                                                                    Sensory: [  x  ] Intact to light touch,   [    ] other:    MEDICATIONS  (STANDING):  acetaminophen     Tablet .. 975 milliGRAM(s) Oral every 8 hours  celecoxib 100 milliGRAM(s) Oral every 12 hours  chlorhexidine 2% Cloths 1 Application(s) Topical daily  cholecalciferol 2000 Unit(s) Oral daily  donepezil 10 milliGRAM(s) Oral at bedtime  enoxaparin Injectable 40 milliGRAM(s) SubCutaneous every 24 hours  escitalopram 5 milliGRAM(s) Oral daily  hydrocortisone 1% Cream 1 Application(s) Topical two times a day  lidocaine   4% Patch 1 Patch Transdermal daily  midodrine. 5 milliGRAM(s) Oral <User Schedule>  multivitamin 1 Tablet(s) Oral daily  oxyCODONE    IR 5 milliGRAM(s) Oral <User Schedule>  polyethylene glycol 3350 17 Gram(s) Oral at bedtime    MEDICATIONS  (PRN):  artificial tears (preservative free) Ophthalmic Solution 1 Drop(s) Both EYES every 6 hours PRN Dry Eyes  melatonin 5 milliGRAM(s) Oral at bedtime PRN Insomnia  oxyCODONE    IR 5 milliGRAM(s) Oral every 4 hours PRN Moderate Pain (4 - 6)      RECENT LABS/IMAGING                          8.3    5.40  )-----------( 286      ( 31 Mar 2025 07:30 )             25.9     03-31    139  |  103  |  13  ----------------------------<  92  4.3   |  26  |  0.6[L]    Ca    8.8      31 Mar 2025 07:30  Mg     2.0     03-31    TPro  5.6[L]  /  Alb  3.5  /  TBili  0.5  /  DBili  x   /  AST  37  /  ALT  44[H]  /  AlkPhos  93  03-31      Urinalysis Basic - ( 31 Mar 2025 07:30 )    Color: x / Appearance: x / SG: x / pH: x  Gluc: 92 mg/dL / Ketone: x  / Bili: x / Urobili: x   Blood: x / Protein: x / Nitrite: x   Leuk Esterase: x / RBC: x / WBC x   Sq Epi: x / Non Sq Epi: x / Bacteria: x     Patient is a 70 y.o. old female who presents with a chief complaint of Rehab of gait abnormality and decline in function 2/2 L hip IT fx s/p ORIF 3/26/2025 (28 Mar 2025 14:30)      HPI:  71 yo F with PMH of mild dementia, anxiety, bilateral cataracts presented to the ED 3/24/2025 s/p fall. Son stated that the pt was walking down stairs and slipped on her socks while descending the final 3 steps and fell on her left side. No head trauma or LOC. In ED CT LLE showed acute, impacted left femoral intertrochanteric fracture. Patient was evaluated by orthopedics and is s/p L hip ORIF 3/24/2025. Postop course complicated by postop anemia and orthostatic hypotension s/p IVF, will continue to monitor in acute rehab.     Imaging:   XR Left Femur: Comminuted trochanteric fracture.  CTH negative  CT Cspine negative  CTAP: Acute, impacted left femoral intertrochanteric fracture. Sacral Tarlov cysts.  CT LLE: Acute, impacted left femoral intertrochanteric fracture. Soft tissues unremarkable. No hip dislocation. Degenerative change of left hip and left knee.      Prior to admission, the patient was independent in ADLs and ambulation without an assistive device. Patient now requires assistance with ambulation and ADLs 2/2 to L Hip ORIF. There is a significant functional decline from baseline. To return home it is in the patient’s best interest to have acute inpatient rehabilitative services to undergo intensive interdisciplinary therapy. Furthermore, the patient is motivated and is able to tolerate up to 3 hours of daily therapy for 5-6 days a week for a total of 15 hours a week. Evaluated by Physiatry and deemed to be an excellent candidate for admission to  for acute inpatient rehab. Admitted to Acute Rehab on 3/27/2025.    Pt seen and examined by Physiatry and is currently functioning at maxA bed mobility, modA transfers, 20’ RW modA, UBD standy-by, LBD modA.    LS: lives with family in  with 5STE and 1F[employer] Inside  PLOF: independent in ADLs and ambulate with no assistive device     (28 Mar 2025 14:30)      I examined the patient and reviewed the chart. There have been no significant changes since the history and physical except where documented below.    TODAY'S SUBJECTIVE & REVIEW OF SYMPTOMS  Romansh  ID [id number]  Patient seen and examined this morning.   No overnight events.   Patient reported dry eyes and mild headache this morning.  Patient will be receiving oxycodone schedule in am;  Will order artificial tears for dry eyes.  Tolerating PT/OT and oral intake well.   Voiding bowel/bladder spontaneously and regularly.  Vital signs reviewed.    CLOF: Partial A for bed mob; TA/RW for transfers; amb 20 ft x 3 using RW/TA    PHYSICAL EXAM    Vital Signs (24 Hrs):  T(C): 36.6 (04-04-25 @ 04:30), Max: 36.9 (04-03-25 @ 21:45)  HR: 61 (04-04-25 @ 04:30) (61 - 99)  BP: 103/64 (04-04-25 @ 04:30) (98/55 - 128/80)  RR: 18 (04-04-25 @ 04:30) (18 - 18)  SpO2: 97% (04-04-25 @ 04:30) (96% - 97%)  Wt(kg): --  Daily     Daily     I&O's Summary        General:[x   ] NAD, Resting Comfortable,   [   ] other:                                HEENT: [x   ] NC/AT, EOMI, PERRL , Normal Conjunctivae,   [   ] other:  Cardio: [  x ] RRR, no murmur,   [   ] other:                              Pulm: [ x  ] No Respiratory Distress,  Lungs CTAB,   [   ] other:                       Abdomen: [x   ]ND/NT, Soft,   [   ] other:    : [ x  ] NO ELIZALDE CATHETER, [   ] ELIZALDE CATHETER- no meatal tear, no discharge, [   ] other:                                            MSK: [   ] No joint swelling, Full ROM,   [  x ] other: L thigh/hip swelling, L hip TTP, L hip ecchymosis                                         Ext: [ x  ]No C/C/E, No calf tenderness,   [   ]other:    Skin: [   ]intact,   [  x ] other: surgical dressing c/d/i                                                                   Neurological Examination:  Cognitive: [    ] AAO x 3,   [x    ]  other: not oriented to year, she knows the year. She is  pleasant and able to give her history. She follows simple commands  well.                                                                Attention:  [ x   ] intact,   [    ]  other:                            Mood/Affect: [   x ] wnl,    [    ]  other:                                                                             CN II - XII:  [  x  ] intact,  [    ] other:                                                                                        Motor:   RIGHT UE: [ x  ] WNL,  [   ] other:  LEFT    UE: [ x  ] WNL,  [   ] other:  RIGHT LE: [ x  ] WNL,  [   ] other:   LEFT    LE: [   ] WNL,  [  x ] other: HF 1+/5, KE/KF 3-/5, PF/DF 5/5 limited 2/2 pain    DTRs: [ x  ]symmetric, [   ] other:                                                                    Sensory: [  x  ] Intact to light touch,   [    ] other:    MEDICATIONS  (STANDING):  acetaminophen     Tablet .. 975 milliGRAM(s) Oral every 8 hours  celecoxib 100 milliGRAM(s) Oral every 12 hours  chlorhexidine 2% Cloths 1 Application(s) Topical daily  cholecalciferol 2000 Unit(s) Oral daily  donepezil 10 milliGRAM(s) Oral at bedtime  enoxaparin Injectable 40 milliGRAM(s) SubCutaneous every 24 hours  escitalopram 5 milliGRAM(s) Oral daily  hydrocortisone 1% Cream 1 Application(s) Topical two times a day  lidocaine   4% Patch 1 Patch Transdermal daily  midodrine. 5 milliGRAM(s) Oral <User Schedule>  multivitamin 1 Tablet(s) Oral daily  oxyCODONE    IR 5 milliGRAM(s) Oral <User Schedule>  polyethylene glycol 3350 17 Gram(s) Oral at bedtime    MEDICATIONS  (PRN):  artificial tears (preservative free) Ophthalmic Solution 1 Drop(s) Both EYES every 6 hours PRN Dry Eyes  melatonin 5 milliGRAM(s) Oral at bedtime PRN Insomnia  oxyCODONE    IR 5 milliGRAM(s) Oral every 4 hours PRN Moderate Pain (4 - 6)      RECENT LABS/IMAGING                          8.3    5.40  )-----------( 286      ( 31 Mar 2025 07:30 )             25.9     03-31    139  |  103  |  13  ----------------------------<  92  4.3   |  26  |  0.6[L]    Ca    8.8      31 Mar 2025 07:30  Mg     2.0     03-31    TPro  5.6[L]  /  Alb  3.5  /  TBili  0.5  /  DBili  x   /  AST  37  /  ALT  44[H]  /  AlkPhos  93  03-31      Urinalysis Basic - ( 31 Mar 2025 07:30 )    Color: x / Appearance: x / SG: x / pH: x  Gluc: 92 mg/dL / Ketone: x  / Bili: x / Urobili: x   Blood: x / Protein: x / Nitrite: x   Leuk Esterase: x / RBC: x / WBC x   Sq Epi: x / Non Sq Epi: x / Bacteria: x     Patient is a 70 y.o. old female who presents with a chief complaint of Rehab of gait abnormality and decline in function 2/2 L hip IT fx s/p ORIF 3/26/2025 (28 Mar 2025 14:30)      HPI:  69 yo F with PMH of mild dementia, anxiety, bilateral cataracts presented to the ED 3/24/2025 s/p fall. Son stated that the pt was walking down stairs and slipped on her socks while descending the final 3 steps and fell on her left side. No head trauma or LOC. In ED CT LLE showed acute, impacted left femoral intertrochanteric fracture. Patient was evaluated by orthopedics and is s/p L hip ORIF 3/24/2025. Postop course complicated by postop anemia and orthostatic hypotension s/p IVF, will continue to monitor in acute rehab.     Imaging:   XR Left Femur: Comminuted trochanteric fracture.  CTH negative  CT Cspine negative  CTAP: Acute, impacted left femoral intertrochanteric fracture. Sacral Tarlov cysts.  CT LLE: Acute, impacted left femoral intertrochanteric fracture. Soft tissues unremarkable. No hip dislocation. Degenerative change of left hip and left knee.      Prior to admission, the patient was independent in ADLs and ambulation without an assistive device. Patient now requires assistance with ambulation and ADLs 2/2 to L Hip ORIF. There is a significant functional decline from baseline. To return home it is in the patient’s best interest to have acute inpatient rehabilitative services to undergo intensive interdisciplinary therapy. Furthermore, the patient is motivated and is able to tolerate up to 3 hours of daily therapy for 5-6 days a week for a total of 15 hours a week. Evaluated by Physiatry and deemed to be an excellent candidate for admission to  for acute inpatient rehab. Admitted to Acute Rehab on 3/27/2025.    Pt seen and examined by Physiatry and is currently functioning at maxA bed mobility, modA transfers, 20’ RW modA, UBD standy-by, LBD modA.    LS: lives with family in  with 5STE and 1FOS Inside  PLOF: independent in ADLs and ambulate with no assistive device     (28 Mar 2025 14:30)      I examined the patient and reviewed the chart. There have been no significant changes since the history and physical except where documented below.    TODAY'S SUBJECTIVE & REVIEW OF SYMPTOMS  [place]  ID [id number]  Patient seen and examined this morning.   No overnight events.   No new complaints this am.  Tolerating PT/OT and oral intake well.   Voiding bowel/bladder spontaneously and regularly.  Vital signs reviewed.    As of 4/3-pt reported chest pain which was reproducible as per NP note; celebrex increased from 100 mg qd to q12.    CLOF: Partial A for bed mob; TA/RW for transfers; amb 20 ft x 3 using RW/TA    PHYSICAL EXAM    Vital Signs (24 Hrs):  T(C): 36.6 (04-04-25 @ 04:30), Max: 36.9 (04-03-25 @ 21:45)  HR: 61 (04-04-25 @ 04:30) (61 - 99)  BP: 103/64 (04-04-25 @ 04:30) (98/55 - 128/80)  RR: 18 (04-04-25 @ 04:30) (18 - 18)  SpO2: 97% (04-04-25 @ 04:30) (96% - 97%)  Wt(kg): --  Daily     Daily     I&O's Summary        General:[x   ] NAD, Resting Comfortable,   [   ] other:                                HEENT: [x   ] NC/AT, EOMI, PERRL , Normal Conjunctivae,   [   ] other:  Cardio: [  x ] RRR, no murmur,   [   ] other:                              Pulm: [ x  ] No Respiratory Distress,  Lungs CTAB,   [   ] other:                       Abdomen: [x   ]ND/NT, Soft,   [   ] other:    : [ x  ] NO ELIZALDE CATHETER, [   ] ELIZALDE CATHETER- no meatal tear, no discharge, [   ] other:                                            MSK: [   ] No joint swelling, Full ROM,   [  x ] other: L thigh/hip swelling, L hip TTP, L hip ecchymosis                                         Ext: [ x  ]No C/C/E, No calf tenderness,   [   ]other:    Skin: [   ]intact,   [  x ] other: surgical dressing c/d/i                                                                   Neurological Examination:  Cognitive: [    ] AAO x 3,   [x    ]  other: not oriented to year, she knows the year. She is  pleasant and able to give her history. She follows simple commands  well.                                                                Attention:  [ x   ] intact,   [    ]  other:                            Mood/Affect: [   x ] wnl,    [    ]  other:                                                                             CN II - XII:  [  x  ] intact,  [    ] other:                                                                                        Motor:   RIGHT UE: [ x  ] WNL,  [   ] other:  LEFT    UE: [ x  ] WNL,  [   ] other:  RIGHT LE: [ x  ] WNL,  [   ] other:   LEFT    LE: [   ] WNL,  [  x ] other: HF 1+/5, KE/KF 3-/5, PF/DF 5/5 limited 2/2 pain    DTRs: [ x  ]symmetric, [   ] other:                                                                    Sensory: [  x  ] Intact to light touch,   [    ] other:    MEDICATIONS  (STANDING):  acetaminophen     Tablet .. 975 milliGRAM(s) Oral every 8 hours  celecoxib 100 milliGRAM(s) Oral every 12 hours  chlorhexidine 2% Cloths 1 Application(s) Topical daily  cholecalciferol 2000 Unit(s) Oral daily  donepezil 10 milliGRAM(s) Oral at bedtime  enoxaparin Injectable 40 milliGRAM(s) SubCutaneous every 24 hours  escitalopram 5 milliGRAM(s) Oral daily  hydrocortisone 1% Cream 1 Application(s) Topical two times a day  lidocaine   4% Patch 1 Patch Transdermal daily  midodrine. 5 milliGRAM(s) Oral <User Schedule>  multivitamin 1 Tablet(s) Oral daily  oxyCODONE    IR 5 milliGRAM(s) Oral <User Schedule>  polyethylene glycol 3350 17 Gram(s) Oral at bedtime    MEDICATIONS  (PRN):  artificial tears (preservative free) Ophthalmic Solution 1 Drop(s) Both EYES every 6 hours PRN Dry Eyes  melatonin 5 milliGRAM(s) Oral at bedtime PRN Insomnia  oxyCODONE    IR 5 milliGRAM(s) Oral every 4 hours PRN Moderate Pain (4 - 6)      RECENT LABS/IMAGING                          8.3    5.40  )-----------( 286      ( 31 Mar 2025 07:30 )             25.9     03-31    139  |  103  |  13  ----------------------------<  92  4.3   |  26  |  0.6[L]    Ca    8.8      31 Mar 2025 07:30  Mg     2.0     03-31    TPro  5.6[L]  /  Alb  3.5  /  TBili  0.5  /  DBili  x   /  AST  37  /  ALT  44[H]  /  AlkPhos  93  03-31      Urinalysis Basic - ( 31 Mar 2025 07:30 )    Color: x / Appearance: x / SG: x / pH: x  Gluc: 92 mg/dL / Ketone: x  / Bili: x / Urobili: x   Blood: x / Protein: x / Nitrite: x   Leuk Esterase: x / RBC: x / WBC x   Sq Epi: x / Non Sq Epi: x / Bacteria: x

## 2025-04-04 NOTE — CHART NOTE - NSCHARTNOTEFT_GEN_A_CORE
Due to gait abnormality secondary to Lt. Hip Fracture, the patient will require a rolling walker for a safe DC. A RW can sufficiently resolve the patient's mobility deficit, and the patient can use the RW safely.

## 2025-04-05 RX ORDER — HYPROMELLOSE 0.4 %
1 DROPS OPHTHALMIC (EYE) EVERY 6 HOURS
Refills: 0 | Status: DISCONTINUED | OUTPATIENT
Start: 2025-04-05 | End: 2025-04-06

## 2025-04-05 RX ADMIN — HYDROCORTISONE 1 APPLICATION(S): 10 CREAM TOPICAL at 06:20

## 2025-04-05 RX ADMIN — OXYCODONE HYDROCHLORIDE 5 MILLIGRAM(S): 30 TABLET ORAL at 13:03

## 2025-04-05 RX ADMIN — Medication 975 MILLIGRAM(S): at 22:00

## 2025-04-05 RX ADMIN — Medication 1 APPLICATION(S): at 13:05

## 2025-04-05 RX ADMIN — Medication 5 MILLIGRAM(S): at 21:18

## 2025-04-05 RX ADMIN — Medication 975 MILLIGRAM(S): at 18:18

## 2025-04-05 RX ADMIN — CELECOXIB 100 MILLIGRAM(S): 50 CAPSULE ORAL at 06:21

## 2025-04-05 RX ADMIN — Medication 975 MILLIGRAM(S): at 21:17

## 2025-04-05 RX ADMIN — OXYCODONE HYDROCHLORIDE 5 MILLIGRAM(S): 30 TABLET ORAL at 11:01

## 2025-04-05 RX ADMIN — MIDODRINE HYDROCHLORIDE 5 MILLIGRAM(S): 5 TABLET ORAL at 11:02

## 2025-04-05 RX ADMIN — MONTELUKAST SODIUM 10 MILLIGRAM(S): 10 TABLET ORAL at 13:03

## 2025-04-05 RX ADMIN — CELECOXIB 100 MILLIGRAM(S): 50 CAPSULE ORAL at 17:59

## 2025-04-05 RX ADMIN — CELECOXIB 100 MILLIGRAM(S): 50 CAPSULE ORAL at 17:16

## 2025-04-05 RX ADMIN — Medication 975 MILLIGRAM(S): at 13:05

## 2025-04-05 RX ADMIN — OXYCODONE HYDROCHLORIDE 5 MILLIGRAM(S): 30 TABLET ORAL at 18:18

## 2025-04-05 RX ADMIN — LIDOCAINE HYDROCHLORIDE 1 PATCH: 20 JELLY TOPICAL at 13:05

## 2025-04-05 RX ADMIN — Medication 1 TABLET(S): at 13:04

## 2025-04-05 RX ADMIN — Medication 975 MILLIGRAM(S): at 06:21

## 2025-04-05 RX ADMIN — CELECOXIB 100 MILLIGRAM(S): 50 CAPSULE ORAL at 06:20

## 2025-04-05 RX ADMIN — ESCITALOPRAM OXALATE 5 MILLIGRAM(S): 20 TABLET ORAL at 13:04

## 2025-04-05 RX ADMIN — LIDOCAINE HYDROCHLORIDE 1 PATCH: 20 JELLY TOPICAL at 17:59

## 2025-04-05 RX ADMIN — Medication 1 DROP(S): at 17:16

## 2025-04-05 RX ADMIN — DONEPEZIL HYDROCHLORIDE 10 MILLIGRAM(S): 5 TABLET ORAL at 21:17

## 2025-04-05 RX ADMIN — ENOXAPARIN SODIUM 40 MILLIGRAM(S): 100 INJECTION SUBCUTANEOUS at 21:18

## 2025-04-05 RX ADMIN — Medication 2000 UNIT(S): at 13:04

## 2025-04-05 RX ADMIN — MIDODRINE HYDROCHLORIDE 5 MILLIGRAM(S): 5 TABLET ORAL at 15:35

## 2025-04-05 RX ADMIN — Medication 975 MILLIGRAM(S): at 06:20

## 2025-04-05 NOTE — CHART NOTE - NSCHARTNOTEFT_GEN_A_CORE
71 yo F with PMH of mild dementia, anxiety, bilateral cataracts presented to the ED 3/24/2025 s/p fall. Son stated that the pt was walking down stairs and slipped on her socks while descending the final 3 steps and fell on her left side. No head trauma or LOC. In ED CT LLE showed acute, impacted left femoral intertrochanteric fracture. Patient was evaluated by orthopedics and is s/p L hip ORIF 3/24/2025. Postop course complicated by postop anemia and orthostatic hypotension s/p IVF, will continue to monitor in acute rehab.     Imaging:   XR Left Femur: Comminuted trochanteric fracture.  CTH negative  CT Cspine negative  CTAP: Acute, impacted left femoral intertrochanteric fracture. Sacral Tarlov cysts.  CT LLE: Acute, impacted left femoral intertrochanteric fracture. Soft tissues unremarkable. No hip dislocation. Degenerative change of left hip and left knee.      Prior to admission, the patient was independent in ADLs and ambulation without an assistive device. Patient now requires assistance with ambulation and ADLs 2/2 to L Hip ORIF. There is a significant functional decline from baseline. To return home it is in the patient’s best interest to have acute inpatient rehabilitative services to undergo intensive interdisciplinary therapy. Furthermore, the patient is motivated and is able to tolerate up to 3 hours of daily therapy for 5-6 days a week for a total of 15 hours a week. Evaluated by Physiatry and deemed to be an excellent candidate for admission to  for acute inpatient rehab. Admitted to Acute Rehab on 3/27/2025.      4/5/2025:  [place]  ID [id number]  As per the therapist, after the session, pt complained of lightheadedness, headache and pain in her eyes with blurry vision (without glasses); Patient also reported itching in left arm  Patient did not have her breakfast this am - usually eats late in the morning, but today food was disposed off before she could eat (while the patient was in PT/OT session)  Patient also did not receive her standing dose of oxycodone this morning before therapy session.    Headache pain scale was 6-7/10   Patient's vision was fine with glasses on;  Patient had similar complain about lightheadedness, headache and dry eyes during the week- Received NS bolus and was started on midodrine.    Her vitals as of now:  Supine 114/58  Sitting 96/63  Standing 109/65    Neurological exam was within WNL.  Will give oxycodone 5 mg oral   Will ensure provision of snacks till lunch time   Informed the nurse about hydrocortisone (already ordered for itching around buttocks) cream for left arm  RN will ensure, breakfast tray is not removed until the patient is done eating (since she likes to eat a little later in am)  Will monitor 69 yo F with PMH of mild dementia, anxiety, bilateral cataracts presented to the ED 3/24/2025 s/p fall. Son stated that the pt was walking down stairs and slipped on her socks while descending the final 3 steps and fell on her left side. No head trauma or LOC. In ED CT LLE showed acute, impacted left femoral intertrochanteric fracture. Patient was evaluated by orthopedics and is s/p L hip ORIF 3/24/2025. Postop course complicated by postop anemia and orthostatic hypotension s/p IVF, will continue to monitor in acute rehab.     Imaging:   XR Left Femur: Comminuted trochanteric fracture.  CTH negative  CT Cspine negative  CTAP: Acute, impacted left femoral intertrochanteric fracture. Sacral Tarlov cysts.  CT LLE: Acute, impacted left femoral intertrochanteric fracture. Soft tissues unremarkable. No hip dislocation. Degenerative change of left hip and left knee.      Prior to admission, the patient was independent in ADLs and ambulation without an assistive device. Patient now requires assistance with ambulation and ADLs 2/2 to L Hip ORIF. There is a significant functional decline from baseline. To return home it is in the patient’s best interest to have acute inpatient rehabilitative services to undergo intensive interdisciplinary therapy. Furthermore, the patient is motivated and is able to tolerate up to 3 hours of daily therapy for 5-6 days a week for a total of 15 hours a week. Evaluated by Physiatry and deemed to be an excellent candidate for admission to  for acute inpatient rehab. Admitted to Acute Rehab on 3/27/2025.      4/5/2025:  [place]  ID [id number]    At 1100 hours    As per the therapist, after the session, pt complained of lightheadedness, headache and pain in her eyes with blurry vision (without glasses); Patient also reported itching in left arm  Patient did not have her breakfast this am - usually eats late in the morning, but today food was disposed off before she could eat (while the patient was in PT/OT session)  Patient also did not receive her standing dose of oxycodone this morning before therapy session and also midodrine that she was supposed to received at 7:30;  Patient received her first dose of midodrine at 11;    Headache pain scale was 6-7/10   Patient's vision was fine with glasses on;  Patient had similar complain about lightheadedness, headache and dry eyes during the week- Received NS bolus and was started on midodrine.    Her vitals as of now:  Supine 114/58  Sitting 96/63  Standing 109/65    Neurological exam was within WNL.  Will give oxycodone 5 mg oral   Will ensure provision of snacks till lunch time   Informed the nurse about hydrocortisone (already ordered for itching around buttocks) cream for left arm  RN will ensure, breakfast tray is not removed until the patient is done eating (since she likes to eat a little later in am)  Will monitor          At 1530 hours  BP  Supine 95/57  Sitting 94/55    Patient's family present at bedside  Patient reported no headache or lightheadedness  Patient is due to receive next dose of midodrine.  Encouraged oral fluid intake;

## 2025-04-05 NOTE — PROGRESS NOTE ADULT - ATTENDING COMMENTS
Rehab of  L hip IT fx,  s/p ORIF. Patient seen and examined with the resident. The treatment plan discussed. Agree with the above.

## 2025-04-05 NOTE — PROGRESS NOTE ADULT - SUBJECTIVE AND OBJECTIVE BOX
Patient is a 70 y.o. old female who presents with a chief complaint of Rehab of gait abnormality and decline in function 2/2 L hip IT fx s/p ORIF 3/26/2025 (28 Mar 2025 14:30)      HPI:  69 yo F with PMH of mild dementia, anxiety, bilateral cataracts presented to the ED 3/24/2025 s/p fall. Son stated that the pt was walking down stairs and slipped on her socks while descending the final 3 steps and fell on her left side. No head trauma or LOC. In ED CT LLE showed acute, impacted left femoral intertrochanteric fracture. Patient was evaluated by orthopedics and is s/p L hip ORIF 3/24/2025. Postop course complicated by postop anemia and orthostatic hypotension s/p IVF, will continue to monitor in acute rehab.     Imaging:   XR Left Femur: Comminuted trochanteric fracture.  CTH negative  CT Cspine negative  CTAP: Acute, impacted left femoral intertrochanteric fracture. Sacral Tarlov cysts.  CT LLE: Acute, impacted left femoral intertrochanteric fracture. Soft tissues unremarkable. No hip dislocation. Degenerative change of left hip and left knee.      Prior to admission, the patient was independent in ADLs and ambulation without an assistive device. Patient now requires assistance with ambulation and ADLs 2/2 to L Hip ORIF. There is a significant functional decline from baseline. To return home it is in the patient’s best interest to have acute inpatient rehabilitative services to undergo intensive interdisciplinary therapy. Furthermore, the patient is motivated and is able to tolerate up to 3 hours of daily therapy for 5-6 days a week for a total of 15 hours a week. Evaluated by Physiatry and deemed to be an excellent candidate for admission to  for acute inpatient rehab. Admitted to Acute Rehab on 3/27/2025.    Pt seen and examined by Physiatry and is currently functioning at maxA bed mobility, modA transfers, 20’ RW modA, UBD standy-by, LBD modA.    LS: lives with family in  with 5STE and 1FOS Inside  PLOF: independent in ADLs and ambulate with no assistive device     (28 Mar 2025 14:30)      I examined the patient and reviewed the chart. There have been no significant changes since the history and physical except where documented below.    TODAY'S SUBJECTIVE & REVIEW OF SYMPTOMS  [place]  ID [id number]  Patient seen and examined this morning.   No overnight events.   No new complaints this am.  Tolerating PT/OT and oral intake well.   Voiding bowel/bladder spontaneously and regularly.  Vital signs reviewed.    As of 4/3-pt reported chest pain which was reproducible as per NP note; celebrex increased from 100 mg qd to q12.    CLOF: Partial A for bed mob; TA/RW for transfers; amb 20 ft x 3 using RW/TA    PHYSICAL EXAM  Vital Signs (24 Hrs):  T(C): 36.8 (04-05-25 @ 05:13), Max: 36.8 (04-04-25 @ 21:39)  HR: 76 (04-05-25 @ 05:13) (63 - 76)  BP: 106/67 (04-05-25 @ 05:13) (93/56 - 106/67)  RR: 18 (04-05-25 @ 05:13) (18 - 18)  SpO2: 95% (04-05-25 @ 05:13) (95% - 100%)  Wt(kg): --  Daily     Daily     I&O's Summary    General:[x   ] NAD, Resting Comfortable,   [   ] other:                                HEENT: [x   ] NC/AT, EOMI, PERRL , Normal Conjunctivae,   [   ] other:  Cardio: [  x ] RRR, no murmur,   [   ] other:                              Pulm: [ x  ] No Respiratory Distress,  Lungs CTAB,   [   ] other:                       Abdomen: [x   ]ND/NT, Soft,   [   ] other:    : [ x  ] NO ELIZALDE CATHETER, [   ] ELIZALDE CATHETER- no meatal tear, no discharge, [   ] other:                                            MSK: [   ] No joint swelling, Full ROM,   [  x ] other: L thigh/hip swelling, L hip TTP, L hip ecchymosis                                         Ext: [ x  ]No C/C/E, No calf tenderness,   [   ]other:    Skin: [   ]intact,   [  x ] other: surgical dressing c/d/i                                                                   Neurological Examination:  Cognitive: [    ] AAO x 3,   [x    ]  other: not oriented to year, she knows the year. She is  pleasant and able to give her history. She follows simple commands  well.                                                                Attention:  [ x   ] intact,   [    ]  other:                            Mood/Affect: [   x ] wnl,    [    ]  other:                                                                             CN II - XII:  [  x  ] intact,  [    ] other:                                                                                        Motor:   RIGHT UE: [ x  ] WNL,  [   ] other:  LEFT    UE: [ x  ] WNL,  [   ] other:  RIGHT LE: [ x  ] WNL,  [   ] other:   LEFT    LE: [   ] WNL,  [  x ] other: HF 1+/5, KE/KF 3-/5, PF/DF 5/5 limited 2/2 pain    DTRs: [ x  ]symmetric, [   ] other:                                                                    Sensory: [  x  ] Intact to light touch,   [    ] other:    MEDICATIONS  (STANDING):  acetaminophen     Tablet .. 975 milliGRAM(s) Oral every 8 hours  celecoxib 100 milliGRAM(s) Oral every 12 hours  chlorhexidine 2% Cloths 1 Application(s) Topical daily  cholecalciferol 2000 Unit(s) Oral daily  donepezil 10 milliGRAM(s) Oral at bedtime  enoxaparin Injectable 40 milliGRAM(s) SubCutaneous every 24 hours  escitalopram 5 milliGRAM(s) Oral daily  hydrocortisone 1% Cream 1 Application(s) Topical two times a day  lidocaine   4% Patch 1 Patch Transdermal daily  midodrine. 5 milliGRAM(s) Oral <User Schedule>  montelukast 10 milliGRAM(s) Oral daily  multivitamin 1 Tablet(s) Oral daily  oxyCODONE    IR 5 milliGRAM(s) Oral <User Schedule>  polyethylene glycol 3350 17 Gram(s) Oral at bedtime    MEDICATIONS  (PRN):  albuterol    90 MICROgram(s) HFA Inhaler 2 Puff(s) Inhalation every 6 hours PRN Shortness of Breath and/or Wheezing  artificial tears (preservative free) Ophthalmic Solution 1 Drop(s) Both EYES every 6 hours PRN Dry Eyes  diphenhydrAMINE 50 milliGRAM(s) Oral every 6 hours PRN Rash and/or Itching  hydrocortisone 1% Cream 1 Application(s) Topical two times a day PRN Rash and/or Itching  melatonin 5 milliGRAM(s) Oral at bedtime PRN Insomnia  oxyCODONE    IR 5 milliGRAM(s) Oral every 4 hours PRN Moderate Pain (4 - 6)      RECENT LABS/IMAGING                          8.3    5.40  )-----------( 286      ( 31 Mar 2025 07:30 )             25.9     03-31    139  |  103  |  13  ----------------------------<  92  4.3   |  26  |  0.6[L]    Ca    8.8      31 Mar 2025 07:30  Mg     2.0     03-31    TPro  5.6[L]  /  Alb  3.5  /  TBili  0.5  /  DBili  x   /  AST  37  /  ALT  44[H]  /  AlkPhos  93  03-31      Urinalysis Basic - ( 31 Mar 2025 07:30 )    Color: x / Appearance: x / SG: x / pH: x  Gluc: 92 mg/dL / Ketone: x  / Bili: x / Urobili: x   Blood: x / Protein: x / Nitrite: x   Leuk Esterase: x / RBC: x / WBC x   Sq Epi: x / Non Sq Epi: x / Bacteria: x

## 2025-04-05 NOTE — CHART NOTE - NSCHARTNOTEFT_GEN_A_CORE
Registered Dietitian Follow-Up     Patient Profile Reviewed                           Yes [x]   No []     Nutrition History Previously Obtained        Yes [x]  No []       Pertinent Subjective Information: pt w/ mild dementia. Per RN, pt is tolerating meals well and w/ good appetite.      Pertinent Medical Interventions: rehab of gait abnormality and declin in function 2/2 L Hip IT fx s/p ORIF 3/26.     Diet order: Diet, Regular:   Halal  Supplement Feeding Modality:  Oral  Ensure Max Cans or Servings Per Day:  1       Frequency:  Daily (03-29-25 @ 10:34) [Active]    Anthropometrics:   Height (cm): 172.7 (03-29-25)  Weight (kg): 80.8 (03-29-25)  BMI (kg/m2): 27.1 (03-29-25)  IBW: 59 kg       Daily   % Weight Change    MEDICATIONS  (STANDING):  acetaminophen     Tablet .. 975 milliGRAM(s) Oral every 8 hours  celecoxib 100 milliGRAM(s) Oral every 12 hours  chlorhexidine 2% Cloths 1 Application(s) Topical daily  cholecalciferol 2000 Unit(s) Oral daily  donepezil 10 milliGRAM(s) Oral at bedtime  enoxaparin Injectable 40 milliGRAM(s) SubCutaneous every 24 hours  escitalopram 5 milliGRAM(s) Oral daily  lidocaine   4% Patch 1 Patch Transdermal daily  midodrine. 5 milliGRAM(s) Oral <User Schedule>  montelukast 10 milliGRAM(s) Oral daily  multivitamin 1 Tablet(s) Oral daily  oxyCODONE    IR 5 milliGRAM(s) Oral <User Schedule>  polyethylene glycol 3350 17 Gram(s) Oral at bedtime       Pertinent Labs:   Finger Sticks:    Physical Findings:  - Appearance: AOx3-- mild dementia  - GI function: Last Bowel Movement: 31-Mar-2025 (04-01-25 @ 04:27) --likely opioid induced constipation  - Tubes: n/a  - Oral/Mouth cavity:  - Edema: no edema noted  -skin: intact, no pressure injuries     Nutrition Requirements: w/ consideration for rehab, BMI, AGE, and mobility.   Weight Used: 80.8 kg     Estimated Energy Needs    Continue [MSJ x1.1-1.2]  Adjust []  Adjusted Energy Recommendations:   8038-6488 kcal/day        Estimated Protein Needs    Continue [1.0-1.2 gm/kg]  Adjust []  Adjusted Protein Recommendations:  80-96 gm/day        Estimated Fluid Needs        Continue [1 ml/kcal]  Adjust []  Adjusted Fluid Recommendations:  1 mL/kcal/ day or per MD orders     Nutrient Intake: Fair to Good     [x] Previous Nutrition Diagnosis: increaed energy needs r/t predicted increase in enerty expenditure AEB REhab of Gain s/p ORIF 3/26            [x] Ongoing          [] Resolved    [] No active nutrition diagnosis identified at this time     Nutrition Education: deferred     Goal/Expected Outcome: Pt to eat 50-75% of 4-6 small frequent meals in next 3-5 days.      Indicator/Monitoring: weekly anthroprometrics, GI S/S, -Lytes (Phos, Mag, K+), BM, I/Os, Labs, NFPF, GI fxn. Energy intake and tolerance.     Recommendation:   -c/w current diet order  - c/w bowel regimen as last BM documented 5 days ago  - monitor PO intake  - Weekly Weights.     Alannah Ellison x [phone] or Via TEAMS    mod risk Follow Up .

## 2025-04-06 RX ORDER — HYPROMELLOSE 0.4 %
1 DROPS OPHTHALMIC (EYE) EVERY 6 HOURS
Refills: 0 | Status: COMPLETED | OUTPATIENT
Start: 2025-04-06 | End: 2025-04-13

## 2025-04-06 RX ADMIN — Medication 1 DROP(S): at 16:50

## 2025-04-06 RX ADMIN — Medication 2000 UNIT(S): at 12:39

## 2025-04-06 RX ADMIN — CELECOXIB 100 MILLIGRAM(S): 50 CAPSULE ORAL at 17:17

## 2025-04-06 RX ADMIN — Medication 1 APPLICATION(S): at 12:40

## 2025-04-06 RX ADMIN — DONEPEZIL HYDROCHLORIDE 10 MILLIGRAM(S): 5 TABLET ORAL at 20:13

## 2025-04-06 RX ADMIN — LIDOCAINE HYDROCHLORIDE 1 PATCH: 20 JELLY TOPICAL at 17:51

## 2025-04-06 RX ADMIN — ENOXAPARIN SODIUM 40 MILLIGRAM(S): 100 INJECTION SUBCUTANEOUS at 20:14

## 2025-04-06 RX ADMIN — Medication 975 MILLIGRAM(S): at 20:13

## 2025-04-06 RX ADMIN — Medication 1 DROP(S): at 00:00

## 2025-04-06 RX ADMIN — Medication 975 MILLIGRAM(S): at 06:57

## 2025-04-06 RX ADMIN — Medication 1 DROP(S): at 20:13

## 2025-04-06 RX ADMIN — Medication 975 MILLIGRAM(S): at 12:39

## 2025-04-06 RX ADMIN — CELECOXIB 100 MILLIGRAM(S): 50 CAPSULE ORAL at 17:50

## 2025-04-06 RX ADMIN — Medication 1 DROP(S): at 06:55

## 2025-04-06 RX ADMIN — Medication 975 MILLIGRAM(S): at 21:00

## 2025-04-06 RX ADMIN — MIDODRINE HYDROCHLORIDE 5 MILLIGRAM(S): 5 TABLET ORAL at 06:57

## 2025-04-06 RX ADMIN — Medication 1 TABLET(S): at 12:38

## 2025-04-06 RX ADMIN — OXYCODONE HYDROCHLORIDE 5 MILLIGRAM(S): 30 TABLET ORAL at 12:42

## 2025-04-06 RX ADMIN — Medication 975 MILLIGRAM(S): at 18:33

## 2025-04-06 RX ADMIN — MONTELUKAST SODIUM 10 MILLIGRAM(S): 10 TABLET ORAL at 12:38

## 2025-04-06 RX ADMIN — OXYCODONE HYDROCHLORIDE 5 MILLIGRAM(S): 30 TABLET ORAL at 08:28

## 2025-04-06 RX ADMIN — Medication 500 MILLILITER(S): at 11:02

## 2025-04-06 RX ADMIN — MIDODRINE HYDROCHLORIDE 5 MILLIGRAM(S): 5 TABLET ORAL at 12:38

## 2025-04-06 RX ADMIN — CELECOXIB 100 MILLIGRAM(S): 50 CAPSULE ORAL at 07:30

## 2025-04-06 RX ADMIN — Medication 1 DROP(S): at 13:34

## 2025-04-06 RX ADMIN — CELECOXIB 100 MILLIGRAM(S): 50 CAPSULE ORAL at 06:57

## 2025-04-06 RX ADMIN — ESCITALOPRAM OXALATE 5 MILLIGRAM(S): 20 TABLET ORAL at 12:39

## 2025-04-06 RX ADMIN — LIDOCAINE HYDROCHLORIDE 1 PATCH: 20 JELLY TOPICAL at 12:38

## 2025-04-06 NOTE — PROGRESS NOTE ADULT - ASSESSMENT
ASSESSMENT/PLAN    70 y.o. F with pmhx of mild dementia, anxiety bilateral cataracts presenting for mechanism fall with Left intertrochanteric hip s/p ORIF 3/24/2025. Postop coarse complicated by orthostatic hypotension postop anemia. Her hip fracture is quite painful.      #Rehab of gait abnormality and decline in function 2/2 L hip IT fx s/p ORIF 3/26/2025  -CT LLE: Acute, impacted left femoral intertrochanteric fracture.  -c/w Tylenol 975 mg q8h ATC, celebrex 100 mg q8AM, and oxycodone 5 mg  q4h prn for pain, oxycodone 5 mg po q8am   -WBAT on the LLE  -PT/OT Eval and treat: Patient requires 3 hrs daily of interdisciplinary inpatient rehab at least 5 days a week.     #Post op anemia, left thigh ecchymosis  -Hgb 11.9 on admission   -Hgb 8.3 3/31  -Monitor CBC, transfuse <7    #Orthostatic hypotension  Close monitoring of her vitals  500 cc NS fluid bolus 3/29  1L NS 3/30  500cc NS fluid bolus 3/31  - encouraged oral fluid intake  - S/p  cc bolus (4/2)  - increase midodrine 2.5 mg bid to 5 mg bid(7:30 am and 12:30 pm) (4/2)    #Headache  #Bilateral eye discomfort  - Patient complaining of headache, noted to be hypotensive, c/w midodrine as above  - Pain management  - Artificial tears for dry eye    #MSK Sternal and rib pain, TTP  Lidocaine patch qd  Monitor    #Dementia, Mild  She benefit from and tolerate 3 hrs of daily intensive therapies.   -C/w home med donepezil 10 mg qhs    #Anxiety/depression  -c/w home med escitalopram 5 mg daily    #Contact dermatitis (4/2)  involving buttocks  - apply hydrocortisone ointment bid     -Pain control: Tylenol 975 mg q8h, Celebrex 100 mg daily at 8 am;  oxycodone 5 mg po q4h prn, oxycodone 5 mg po daily at 8 a.m and 12:30 pm    -GI/Bowel Mgmt: senna/Miralax     - Diet: Regular, Halal      Precautions / PROPHYLAXIS:      - Falls    - Ortho: Weight bearing status: WBAT LLE      - DVT prophylaxis: Lovenox  ASSESSMENT/PLAN    70 y.o. F with pmhx of mild dementia, anxiety bilateral cataracts presenting for mechanism fall with Left intertrochanteric hip s/p ORIF 3/24/2025. Postop coarse complicated by orthostatic hypotension postop anemia. Her hip fracture is quite painful.      #Rehab of gait abnormality and decline in function 2/2 L hip IT fx s/p ORIF 3/26/2025  -CT LLE: Acute, impacted left femoral intertrochanteric fracture.  -c/w Tylenol 975 mg q8h ATC, celebrex 100 mg q8AM, and oxycodone 5 mg  q4h prn for pain, oxycodone 5 mg po q8am   -WBAT on the LLE  -PT/OT Eval and treat: Patient requires 3 hrs daily of interdisciplinary inpatient rehab at least 5 days a week.     #Post op anemia, left thigh ecchymosis  -Hgb 11.9 on admission   -Hgb 8.3 3/31  -Monitor CBC, transfuse <7    #Orthostatic hypotension  Close monitoring of her vitals  500 cc NS fluid bolus 3/29  1L NS 3/30  500cc NS fluid bolus 3/31  - encouraged oral fluid intake  - S/p  cc bolus (4/2)  - increase midodrine 2.5 mg bid to 5 mg bid(7:30 am and 12:30 pm) (4/2)  - Symptomatic 4/6 dizziness, headache will give another 500cc bolus    #Headache  #Bilateral eye discomfort  - Patient complaining of headache, noted to be hypotensive, c/w midodrine as above  - Pain management  - Artificial tears for dry eye    #MSK Sternal and rib pain, TTP  Lidocaine patch qd  Monitor    #Dementia, Mild  She benefit from and tolerate 3 hrs of daily intensive therapies.   -C/w home med donepezil 10 mg qhs    #Anxiety/depression  -c/w home med escitalopram 5 mg daily    #Contact dermatitis (4/2)  involving buttocks  - apply hydrocortisone ointment bid     -Pain control: Tylenol 975 mg q8h, Celebrex 100 mg daily at 8 am;  oxycodone 5 mg po q4h prn, oxycodone 5 mg po daily at 8 a.m and 12:30 pm    -GI/Bowel Mgmt: senna/Miralax     - Diet: Regular, Halal      Precautions / PROPHYLAXIS:      - Falls    - Ortho: Weight bearing status: WBAT LLE      - DVT prophylaxis: Lovenox

## 2025-04-06 NOTE — PROGRESS NOTE ADULT - SUBJECTIVE AND OBJECTIVE BOX
Patient is a 70 y.o. old female who presents with a chief complaint of Rehab of gait abnormality and decline in function 2/2 L hip IT fx s/p ORIF 3/26/2025 (28 Mar 2025 14:30)      HPI:  69 yo F with PMH of mild dementia, anxiety, bilateral cataracts presented to the ED 3/24/2025 s/p fall. Son stated that the pt was walking down stairs and slipped on her socks while descending the final 3 steps and fell on her left side. No head trauma or LOC. In ED CT LLE showed acute, impacted left femoral intertrochanteric fracture. Patient was evaluated by orthopedics and is s/p L hip ORIF 3/24/2025. Postop course complicated by postop anemia and orthostatic hypotension s/p IVF, will continue to monitor in acute rehab.     Imaging:   XR Left Femur: Comminuted trochanteric fracture.  CTH negative  CT Cspine negative  CTAP: Acute, impacted left femoral intertrochanteric fracture. Sacral Tarlov cysts.  CT LLE: Acute, impacted left femoral intertrochanteric fracture. Soft tissues unremarkable. No hip dislocation. Degenerative change of left hip and left knee.      Prior to admission, the patient was independent in ADLs and ambulation without an assistive device. Patient now requires assistance with ambulation and ADLs 2/2 to L Hip ORIF. There is a significant functional decline from baseline. To return home it is in the patient’s best interest to have acute inpatient rehabilitative services to undergo intensive interdisciplinary therapy. Furthermore, the patient is motivated and is able to tolerate up to 3 hours of daily therapy for 5-6 days a week for a total of 15 hours a week. Evaluated by Physiatry and deemed to be an excellent candidate for admission to  for acute inpatient rehab. Admitted to Acute Rehab on 3/27/2025.    Pt seen and examined by Physiatry and is currently functioning at maxA bed mobility, modA transfers, 20’ RW modA, UBD standy-by, LBD modA.    LS: lives with family in  with 5STE and 1FOS Inside  PLOF: independent in ADLs and ambulate with no assistive device     (28 Mar 2025 14:30)    TODAY'S SUBJECTIVE & REVIEW OF SYMPTOMS  Vietnamese  ID [id number]  Patient seen and examined this morning.   No overnight events.   Patient continues to complain of dizziness, headache and bilateral eye pain, asking for eye drops this morning. She has been having these same complaints intermittently throughout the week.   Tolerating PT/OT and oral intake well.   Voiding bowel/bladder spontaneously and regularly.  Vital signs reviewed. BP noted to be on softer side, encouraged PO hydration.     As of 4/3-pt reported chest pain which was reproducible as per NP note; celebrex increased from 100 mg qd to q12.    CLOF: Partial A for bed mob; TA/RW for transfers; amb 20 ft x 3 using RW/TA    PHYSICAL EXAM  Vital Signs Last 24 Hrs  T(C): 37.1 (06 Apr 2025 05:53), Max: 37.1 (06 Apr 2025 05:53)  T(F): 98.7 (06 Apr 2025 05:53), Max: 98.7 (06 Apr 2025 05:53)  HR: 66 (06 Apr 2025 08:59) (57 - 80)  BP: 105/61 (06 Apr 2025 08:59) (93/54 - 105/61)  BP(mean): --  RR: 18 (06 Apr 2025 05:53) (18 - 18)  SpO2: --        General:[x   ] NAD, Resting Comfortable,   [   ] other:                                HEENT: [x   ] NC/AT, EOMI, PERRL , Normal Conjunctivae,   [   ] other:  Cardio: [  x ] RRR, no murmur,   [   ] other:                              Pulm: [ x  ] No Respiratory Distress,  Lungs CTAB,   [   ] other:                       Abdomen: [x   ]ND/NT, Soft,   [   ] other:    : [ x  ] NO ELIZALDE CATHETER, [   ] ELIZALDE CATHETER- no meatal tear, no discharge, [   ] other:                                            MSK: [   ] No joint swelling, Full ROM,   [  x ] other: L thigh/hip swelling, L hip TTP, L hip ecchymosis                                         Ext: [ x  ]No C/C/E, No calf tenderness,   [   ]other:    Skin: [   ]intact,   [  x ] other: surgical dressing c/d/i                                                                   Neurological Examination:  Cognitive: [    ] AAO x 3,   [x    ]  other: not oriented to year, she knows the year. She is  pleasant and able to give her history. She follows simple commands  well.                                                                Attention:  [ x   ] intact,   [    ]  other:                            Mood/Affect: [   x ] wnl,    [    ]  other:                                                                             CN II - XII:  [  x  ] intact,  [    ] other:                                                                                        Motor:   RIGHT UE: [ x  ] WNL,  [   ] other:  LEFT    UE: [ x  ] WNL,  [   ] other:  RIGHT LE: [ x  ] WNL,  [   ] other:   LEFT    LE: [   ] WNL,  [  x ] other: HF 1+/5, KE/KF 3-/5, PF/DF 5/5 limited 2/2 pain    DTRs: [ x  ]symmetric, [   ] other:                                                                    Sensory: [  x  ] Intact to light touch,   [    ] other:    MEDICATIONS  (STANDING):  acetaminophen     Tablet .. 975 milliGRAM(s) Oral every 8 hours  artificial tears (preservative free) Ophthalmic Solution 1 Drop(s) Both EYES every 6 hours  celecoxib 100 milliGRAM(s) Oral every 12 hours  chlorhexidine 2% Cloths 1 Application(s) Topical daily  cholecalciferol 2000 Unit(s) Oral daily  donepezil 10 milliGRAM(s) Oral at bedtime  enoxaparin Injectable 40 milliGRAM(s) SubCutaneous every 24 hours  escitalopram 5 milliGRAM(s) Oral daily  lidocaine   4% Patch 1 Patch Transdermal daily  midodrine. 5 milliGRAM(s) Oral <User Schedule>  montelukast 10 milliGRAM(s) Oral daily  multivitamin 1 Tablet(s) Oral daily  oxyCODONE    IR 5 milliGRAM(s) Oral <User Schedule>  polyethylene glycol 3350 17 Gram(s) Oral at bedtime    MEDICATIONS  (PRN):  albuterol    90 MICROgram(s) HFA Inhaler 2 Puff(s) Inhalation every 6 hours PRN Shortness of Breath and/or Wheezing  diphenhydrAMINE 50 milliGRAM(s) Oral every 6 hours PRN Rash and/or Itching  hydrocortisone 1% Cream 1 Application(s) Topical two times a day PRN Rash and/or Itching  melatonin 5 milliGRAM(s) Oral at bedtime PRN Insomnia  oxyCODONE    IR 5 milliGRAM(s) Oral every 4 hours PRN Moderate Pain (4 - 6)      RECENT LABS/IMAGING reviewed

## 2025-04-07 LAB
ALBUMIN SERPL ELPH-MCNC: 3.8 G/DL — SIGNIFICANT CHANGE UP (ref 3.5–5.2)
ALP SERPL-CCNC: 174 U/L — HIGH (ref 30–115)
ALT FLD-CCNC: 58 U/L — HIGH (ref 0–41)
ANION GAP SERPL CALC-SCNC: 8 MMOL/L — SIGNIFICANT CHANGE UP (ref 7–14)
AST SERPL-CCNC: 47 U/L — HIGH (ref 0–41)
BASOPHILS # BLD AUTO: 0.06 K/UL — SIGNIFICANT CHANGE UP (ref 0–0.2)
BASOPHILS NFR BLD AUTO: 1 % — SIGNIFICANT CHANGE UP (ref 0–1)
BILIRUB SERPL-MCNC: 0.3 MG/DL — SIGNIFICANT CHANGE UP (ref 0.2–1.2)
BUN SERPL-MCNC: 17 MG/DL — SIGNIFICANT CHANGE UP (ref 10–20)
CALCIUM SERPL-MCNC: 9.6 MG/DL — SIGNIFICANT CHANGE UP (ref 8.4–10.5)
CHLORIDE SERPL-SCNC: 103 MMOL/L — SIGNIFICANT CHANGE UP (ref 98–110)
CO2 SERPL-SCNC: 26 MMOL/L — SIGNIFICANT CHANGE UP (ref 17–32)
CREAT SERPL-MCNC: 0.6 MG/DL — LOW (ref 0.7–1.5)
EGFR: 96 ML/MIN/1.73M2 — SIGNIFICANT CHANGE UP
EGFR: 96 ML/MIN/1.73M2 — SIGNIFICANT CHANGE UP
EOSINOPHIL # BLD AUTO: 0.38 K/UL — SIGNIFICANT CHANGE UP (ref 0–0.7)
EOSINOPHIL NFR BLD AUTO: 6.3 % — SIGNIFICANT CHANGE UP (ref 0–8)
GLUCOSE SERPL-MCNC: 89 MG/DL — SIGNIFICANT CHANGE UP (ref 70–99)
HCT VFR BLD CALC: 28 % — LOW (ref 37–47)
HGB BLD-MCNC: 8.7 G/DL — LOW (ref 12–16)
IMM GRANULOCYTES NFR BLD AUTO: 0.2 % — SIGNIFICANT CHANGE UP (ref 0.1–0.3)
LYMPHOCYTES # BLD AUTO: 2.76 K/UL — SIGNIFICANT CHANGE UP (ref 1.2–3.4)
LYMPHOCYTES # BLD AUTO: 45.4 % — SIGNIFICANT CHANGE UP (ref 20.5–51.1)
MAGNESIUM SERPL-MCNC: 2.2 MG/DL — SIGNIFICANT CHANGE UP (ref 1.8–2.4)
MCHC RBC-ENTMCNC: 22.6 PG — LOW (ref 27–31)
MCHC RBC-ENTMCNC: 31.1 G/DL — LOW (ref 32–37)
MCV RBC AUTO: 72.7 FL — LOW (ref 81–99)
MONOCYTES # BLD AUTO: 0.53 K/UL — SIGNIFICANT CHANGE UP (ref 0.1–0.6)
MONOCYTES NFR BLD AUTO: 8.7 % — SIGNIFICANT CHANGE UP (ref 1.7–9.3)
NEUTROPHILS # BLD AUTO: 2.34 K/UL — SIGNIFICANT CHANGE UP (ref 1.4–6.5)
NEUTROPHILS NFR BLD AUTO: 38.4 % — LOW (ref 42.2–75.2)
NRBC BLD AUTO-RTO: 0 /100 WBCS — SIGNIFICANT CHANGE UP (ref 0–0)
PLATELET # BLD AUTO: 409 K/UL — HIGH (ref 130–400)
PMV BLD: 10 FL — SIGNIFICANT CHANGE UP (ref 7.4–10.4)
POTASSIUM SERPL-MCNC: 5.1 MMOL/L — HIGH (ref 3.5–5)
POTASSIUM SERPL-SCNC: 5.1 MMOL/L — HIGH (ref 3.5–5)
PROT SERPL-MCNC: 7 G/DL — SIGNIFICANT CHANGE UP (ref 6–8)
RBC # BLD: 3.85 M/UL — LOW (ref 4.2–5.4)
RBC # FLD: 16.6 % — HIGH (ref 11.5–14.5)
SODIUM SERPL-SCNC: 137 MMOL/L — SIGNIFICANT CHANGE UP (ref 135–146)
WBC # BLD: 6.08 K/UL — SIGNIFICANT CHANGE UP (ref 4.8–10.8)
WBC # FLD AUTO: 6.08 K/UL — SIGNIFICANT CHANGE UP (ref 4.8–10.8)

## 2025-04-07 RX ORDER — OXYCODONE HYDROCHLORIDE 30 MG/1
5 TABLET ORAL
Refills: 0 | Status: DISCONTINUED | OUTPATIENT
Start: 2025-04-08 | End: 2025-04-14

## 2025-04-07 RX ADMIN — Medication 1 DROP(S): at 05:13

## 2025-04-07 RX ADMIN — Medication 1 DROP(S): at 14:54

## 2025-04-07 RX ADMIN — Medication 1 TABLET(S): at 12:17

## 2025-04-07 RX ADMIN — LIDOCAINE HYDROCHLORIDE 1 PATCH: 20 JELLY TOPICAL at 22:22

## 2025-04-07 RX ADMIN — OXYCODONE HYDROCHLORIDE 5 MILLIGRAM(S): 30 TABLET ORAL at 17:39

## 2025-04-07 RX ADMIN — Medication 975 MILLIGRAM(S): at 21:43

## 2025-04-07 RX ADMIN — MIDODRINE HYDROCHLORIDE 5 MILLIGRAM(S): 5 TABLET ORAL at 12:27

## 2025-04-07 RX ADMIN — Medication 2000 UNIT(S): at 12:17

## 2025-04-07 RX ADMIN — OXYCODONE HYDROCHLORIDE 5 MILLIGRAM(S): 30 TABLET ORAL at 08:06

## 2025-04-07 RX ADMIN — Medication 1 APPLICATION(S): at 12:18

## 2025-04-07 RX ADMIN — CELECOXIB 100 MILLIGRAM(S): 50 CAPSULE ORAL at 05:12

## 2025-04-07 RX ADMIN — POLYETHYLENE GLYCOL 3350 17 GRAM(S): 17 POWDER, FOR SOLUTION ORAL at 21:44

## 2025-04-07 RX ADMIN — LIDOCAINE HYDROCHLORIDE 1 PATCH: 20 JELLY TOPICAL at 12:27

## 2025-04-07 RX ADMIN — MIDODRINE HYDROCHLORIDE 5 MILLIGRAM(S): 5 TABLET ORAL at 06:54

## 2025-04-07 RX ADMIN — Medication 975 MILLIGRAM(S): at 17:38

## 2025-04-07 RX ADMIN — DONEPEZIL HYDROCHLORIDE 10 MILLIGRAM(S): 5 TABLET ORAL at 21:44

## 2025-04-07 RX ADMIN — Medication 975 MILLIGRAM(S): at 06:53

## 2025-04-07 RX ADMIN — ENOXAPARIN SODIUM 40 MILLIGRAM(S): 100 INJECTION SUBCUTANEOUS at 21:43

## 2025-04-07 RX ADMIN — OXYCODONE HYDROCHLORIDE 5 MILLIGRAM(S): 30 TABLET ORAL at 12:27

## 2025-04-07 RX ADMIN — CELECOXIB 100 MILLIGRAM(S): 50 CAPSULE ORAL at 06:53

## 2025-04-07 RX ADMIN — Medication 1 DROP(S): at 21:44

## 2025-04-07 RX ADMIN — Medication 975 MILLIGRAM(S): at 14:40

## 2025-04-07 RX ADMIN — Medication 975 MILLIGRAM(S): at 21:44

## 2025-04-07 RX ADMIN — Medication 1 DROP(S): at 18:57

## 2025-04-07 RX ADMIN — CELECOXIB 100 MILLIGRAM(S): 50 CAPSULE ORAL at 18:57

## 2025-04-07 RX ADMIN — Medication 975 MILLIGRAM(S): at 05:13

## 2025-04-07 RX ADMIN — MONTELUKAST SODIUM 10 MILLIGRAM(S): 10 TABLET ORAL at 12:18

## 2025-04-07 RX ADMIN — LIDOCAINE HYDROCHLORIDE 1 PATCH: 20 JELLY TOPICAL at 20:06

## 2025-04-07 RX ADMIN — OXYCODONE HYDROCHLORIDE 5 MILLIGRAM(S): 30 TABLET ORAL at 17:40

## 2025-04-07 RX ADMIN — ESCITALOPRAM OXALATE 5 MILLIGRAM(S): 20 TABLET ORAL at 12:17

## 2025-04-07 RX ADMIN — Medication 1 DROP(S): at 00:00

## 2025-04-07 RX ADMIN — Medication 975 MILLIGRAM(S): at 18:58

## 2025-04-07 NOTE — PROGRESS NOTE ADULT - SUBJECTIVE AND OBJECTIVE BOX
Patient is a 70 y.o. old female who presents with a chief complaint of Rehab of gait abnormality and decline in function 2/2 L hip IT fx s/p ORIF 3/26/2025 (28 Mar 2025 14:30)      HPI:  69 yo F with PMH of mild dementia, anxiety, bilateral cataracts presented to the ED 3/24/2025 s/p fall. Son stated that the pt was walking down stairs and slipped on her socks while descending the final 3 steps and fell on her left side. No head trauma or LOC. In ED CT LLE showed acute, impacted left femoral intertrochanteric fracture. Patient was evaluated by orthopedics and is s/p L hip ORIF 3/24/2025. Postop course complicated by postop anemia and orthostatic hypotension s/p IVF, will continue to monitor in acute rehab.     Imaging:   XR Left Femur: Comminuted trochanteric fracture.  CTH negative  CT Cspine negative  CTAP: Acute, impacted left femoral intertrochanteric fracture. Sacral Tarlov cysts.  CT LLE: Acute, impacted left femoral intertrochanteric fracture. Soft tissues unremarkable. No hip dislocation. Degenerative change of left hip and left knee.      Prior to admission, the patient was independent in ADLs and ambulation without an assistive device. Patient now requires assistance with ambulation and ADLs 2/2 to L Hip ORIF. There is a significant functional decline from baseline. To return home it is in the patient’s best interest to have acute inpatient rehabilitative services to undergo intensive interdisciplinary therapy. Furthermore, the patient is motivated and is able to tolerate up to 3 hours of daily therapy for 5-6 days a week for a total of 15 hours a week. Evaluated by Physiatry and deemed to be an excellent candidate for admission to  for acute inpatient rehab. Admitted to Acute Rehab on 3/27/2025.    Pt seen and examined by Physiatry and is currently functioning at maxA bed mobility, modA transfers, 20’ RW modA, UBD standy-by, LBD modA.    LS: lives with family in  with 5STE and 1FOS Inside  PLOF: independent in ADLs and ambulate with no assistive device     (28 Mar 2025 14:30)    TODAY'S SUBJECTIVE & REVIEW OF SYMPTOMS  Italian  with therapist.  Patient seen and examined this morning in gym. Some complaints of bilateral knee pain/crepitus was having wraps done with therapist. Feeling well has some pain but manageable.    No overnight events.   Had some complaints over weekend including dizziness, headache and bilateral eye pain, better after IV fluids.   Tolerating PT/OT and oral intake well.   Voiding bowel/bladder spontaneously and regularly.  Vital signs reviewed.     As of 4/3-pt reported chest pain which was reproducible as per NP note; celebrex increased from 100 mg qd to q12.    CLOF: Partial A for bed mob; TA/RW for transfers; amb 20 ft x 3 using RW/TA    PHYSICAL EXAM  Vital Signs Last 24 Hrs  T(C): 36.8 (07 Apr 2025 11:47), Max: 36.8 (07 Apr 2025 11:47)  T(F): 98.2 (07 Apr 2025 11:47), Max: 98.2 (07 Apr 2025 11:47)  HR: 64 (07 Apr 2025 11:47) (64 - 64)  BP: 107/68 (07 Apr 2025 11:47) (105/63 - 113/63)  BP(mean): --  RR: 18 (07 Apr 2025 11:47) (18 - 18)  SpO2: 100% (07 Apr 2025 11:47) (100% - 100%)        General:[x   ] NAD, Resting Comfortable,   [   ] other:                                HEENT: [x   ] NC/AT, EOMI, PERRL , Normal Conjunctivae,   [   ] other:  Cardio: [  x ] RRR, no murmur,   [   ] other:                              Pulm: [ x  ] No Respiratory Distress,  Lungs CTAB,   [   ] other:                       Abdomen: [x   ]ND/NT, Soft,   [   ] other:    : [ x  ] NO ELIZALDE CATHETER, [   ] ELIZALDE CATHETER- no meatal tear, no discharge, [   ] other:                                            MSK: [   ] No joint swelling, Full ROM,   [  x ] other: L thigh/hip swelling, L hip TTP, L hip ecchymosis                                         Ext: [ x  ]No C/C/E, No calf tenderness,   [   ]other:    Skin: [   ]intact,   [  x ] other: surgical dressing c/d/i                                                                   Neurological Examination:  Cognitive: [    ] AAO x 3,   [x    ]  other: not oriented to year, she knows the year. She is  pleasant and able to give her history. She follows simple commands  well.                                                                Attention:  [ x   ] intact,   [    ]  other:                            Mood/Affect: [   x ] wnl,    [    ]  other:                                                                             CN II - XII:  [  x  ] intact,  [    ] other:                                                                                        Motor:   RIGHT UE: [ x  ] WNL,  [   ] other:  LEFT    UE: [ x  ] WNL,  [   ] other:  RIGHT LE: [ x  ] WNL,  [   ] other:   LEFT    LE: [   ] WNL,  [  x ] other: HF 1+/5, KE/KF 3-/5, PF/DF 5/5 limited 2/2 pain    DTRs: [ x  ]symmetric, [   ] other:                                                                    Sensory: [  x  ] Intact to light touch,   [    ] other:    MEDICATIONS  (STANDING):  acetaminophen     Tablet .. 975 milliGRAM(s) Oral every 8 hours  artificial tears (preservative free) Ophthalmic Solution 1 Drop(s) Both EYES every 6 hours  celecoxib 100 milliGRAM(s) Oral every 12 hours  chlorhexidine 2% Cloths 1 Application(s) Topical daily  cholecalciferol 2000 Unit(s) Oral daily  donepezil 10 milliGRAM(s) Oral at bedtime  enoxaparin Injectable 40 milliGRAM(s) SubCutaneous every 24 hours  escitalopram 5 milliGRAM(s) Oral daily  lidocaine   4% Patch 1 Patch Transdermal daily  midodrine. 5 milliGRAM(s) Oral <User Schedule>  montelukast 10 milliGRAM(s) Oral daily  multivitamin 1 Tablet(s) Oral daily  oxyCODONE    IR 5 milliGRAM(s) Oral <User Schedule>  polyethylene glycol 3350 17 Gram(s) Oral at bedtime    MEDICATIONS  (PRN):  albuterol    90 MICROgram(s) HFA Inhaler 2 Puff(s) Inhalation every 6 hours PRN Shortness of Breath and/or Wheezing  diphenhydrAMINE 50 milliGRAM(s) Oral every 6 hours PRN Rash and/or Itching  hydrocortisone 1% Cream 1 Application(s) Topical two times a day PRN Rash and/or Itching  melatonin 5 milliGRAM(s) Oral at bedtime PRN Insomnia  oxyCODONE    IR 5 milliGRAM(s) Oral every 4 hours PRN Moderate Pain (4 - 6)      RECENT LABS/IMAGING                        8.7    6.08  )-----------( 409      ( 07 Apr 2025 05:15 )             28.0     04-07    137  |  103  |  17  ----------------------------<  89  5.1[H]   |  26  |  0.6[L]    Ca    9.6      07 Apr 2025 05:15  Mg     2.2     04-07    TPro  7.0  /  Alb  3.8  /  TBili  0.3  /  DBili  x   /  AST  47[H]  /  ALT  58[H]  /  AlkPhos  174[H]  04-07      Urinalysis Basic - ( 07 Apr 2025 05:15 )    Color: x / Appearance: x / SG: x / pH: x  Gluc: 89 mg/dL / Ketone: x  / Bili: x / Urobili: x   Blood: x / Protein: x / Nitrite: x   Leuk Esterase: x / RBC: x / WBC x   Sq Epi: x / Non Sq Epi: x / Bacteria: x

## 2025-04-07 NOTE — PROGRESS NOTE ADULT - ATTENDING COMMENTS
Patient seen and examined with the resident. We discussed the case. I have directed the care. I edited the note. The patient requires acute rehab with 3 hours of daily therapies at least 5 out of 7 days and close physiatry follow up.  #Rehab of gait abnormality and decline in function 2/2 L hip IT fx s/p ORIF 3/26/2025  -CT LLE: Acute, impacted left femoral intertrochanteric fracture.  -c/w Tylenol 975 mg q8h ATC, celebrex 100 mg q8AM, and oxycodone 5 mg  q4h prn for pain, oxycodone 5 mg po q8am   -WBAT on the LLE  -PT/OT Eval and treat: Patient requires 3 hrs daily of interdisciplinary inpatient rehab at least 5 days a week.     #Post op anemia, left thigh ecchymosis  -Hgb 11.9 on admission   -Hgb 8.3 3/31  -Monitor CBC, transfuse <7    #Orthostatic hypotension  Close monitoring of her vitals  500 cc NS fluid bolus 3/29  1L NS 3/30  500cc NS fluid bolus 3/31  - encouraged oral fluid intake  - S/p  cc bolus (4/2)  - increase midodrine 2.5 mg bid to 5 mg bid(7:30 am and 12:30 pm) (4/2)  - Symptomatic 4/6 dizziness, headache will give another 500cc bolus with improvement in symptoms    #Headache  #Bilateral eye discomfort  - Patient complaining of headache, noted to be hypotensive, c/w midodrine as above  - Pain management  - Artificial tears for dry eye    #Hyperkalemia  - K 5.1 on 4/7, monitor BM and trend    #MSK Sternal and rib pain, TTP  Lidocaine patch qd  Monitor    #Dementia, Mild  She benefit from and tolerate 3 hrs of daily intensive therapies.   -C/w home med donepezil 10 mg qhs    #Anxiety/depression  -c/w home med escitalopram 5 mg daily    #Contact dermatitis (4/2)  involving buttocks  - apply hydrocortisone ointment bid     -Pain control: Tylenol 975 mg q8h, Celebrex 100 mg miriam a day;  oxycodone 5 mg po q4h prn, oxycodone 5 mg po daily at 8 a.m and 12:30 pm    -GI/Bowel Mgmt: senna/Miralax

## 2025-04-07 NOTE — PROGRESS NOTE ADULT - ASSESSMENT
ASSESSMENT/PLAN    70 y.o. F with pmhx of mild dementia, anxiety bilateral cataracts presenting for mechanism fall with Left intertrochanteric hip s/p ORIF 3/24/2025. Postop coarse complicated by orthostatic hypotension postop anemia. Her hip fracture is quite painful.      #Rehab of gait abnormality and decline in function 2/2 L hip IT fx s/p ORIF 3/26/2025  -CT LLE: Acute, impacted left femoral intertrochanteric fracture.  -c/w Tylenol 975 mg q8h ATC, celebrex 100 mg q8AM, and oxycodone 5 mg  q4h prn for pain, oxycodone 5 mg po q8am   -WBAT on the LLE  -PT/OT Eval and treat: Patient requires 3 hrs daily of interdisciplinary inpatient rehab at least 5 days a week.     #Post op anemia, left thigh ecchymosis  -Hgb 11.9 on admission   -Hgb 8.3 3/31  -Monitor CBC, transfuse <7    #Orthostatic hypotension  Close monitoring of her vitals  500 cc NS fluid bolus 3/29  1L NS 3/30  500cc NS fluid bolus 3/31  - encouraged oral fluid intake  - S/p  cc bolus (4/2)  - increase midodrine 2.5 mg bid to 5 mg bid(7:30 am and 12:30 pm) (4/2)  - Symptomatic 4/6 dizziness, headache will give another 500cc bolus with improvement in symptoms    #Headache  #Bilateral eye discomfort  - Patient complaining of headache, noted to be hypotensive, c/w midodrine as above  - Pain management  - Artificial tears for dry eye    #Hyperkalemia  - K 5.1 on 4/7, monitor BM and trend    #MSK Sternal and rib pain, TTP  Lidocaine patch qd  Monitor    #Dementia, Mild  She benefit from and tolerate 3 hrs of daily intensive therapies.   -C/w home med donepezil 10 mg qhs    #Anxiety/depression  -c/w home med escitalopram 5 mg daily    #Contact dermatitis (4/2)  involving buttocks  - apply hydrocortisone ointment bid     -Pain control: Tylenol 975 mg q8h, Celebrex 100 mg daily at 8 am;  oxycodone 5 mg po q4h prn, oxycodone 5 mg po daily at 8 a.m and 12:30 pm    -GI/Bowel Mgmt: senna/Miralax     - Diet: Regular, Halal      Precautions / PROPHYLAXIS:      - Falls    - Ortho: Weight bearing status: WBAT LLE      - DVT prophylaxis: Lovenox  ASSESSMENT/PLAN    70 y.o. F with pmhx of mild dementia, anxiety bilateral cataracts presenting for mechanism fall with Left intertrochanteric hip s/p ORIF 3/24/2025. Postop coarse complicated by orthostatic hypotension postop anemia. Her hip fracture is quite painful.      #Rehab of gait abnormality and decline in function 2/2 L hip IT fx s/p ORIF 3/26/2025  -CT LLE: Acute, impacted left femoral intertrochanteric fracture.  -c/w Tylenol 975 mg q8h ATC, celebrex 100 mg q8AM, and oxycodone 5 mg  q4h prn for pain, oxycodone 5 mg po q8am   -WBAT on the LLE  -PT/OT Eval and treat: Patient requires 3 hrs daily of interdisciplinary inpatient rehab at least 5 days a week.     #Post op anemia, left thigh ecchymosis  -Hgb 11.9 on admission   -Hgb 8.3 3/31  -Monitor CBC, transfuse <7    #Orthostatic hypotension  Close monitoring of her vitals  500 cc NS fluid bolus 3/29  1L NS 3/30  500cc NS fluid bolus 3/31  - encouraged oral fluid intake  - S/p  cc bolus (4/2)  - increase midodrine 2.5 mg bid to 5 mg bid(7:30 am and 12:30 pm) (4/2)  - Symptomatic 4/6 dizziness, headache will give another 500cc bolus with improvement in symptoms    #Headache  #Bilateral eye discomfort  - Patient complaining of headache, noted to be hypotensive, c/w midodrine as above  - Pain management  - Artificial tears for dry eye    #Hyperkalemia  - K 5.1 on 4/7, monitor BM and trend    #MSK Sternal and rib pain, TTP  Lidocaine patch qd  Monitor    #Dementia, Mild  She benefit from and tolerate 3 hrs of daily intensive therapies.   -C/w home med donepezil 10 mg qhs    #Anxiety/depression  -c/w home med escitalopram 5 mg daily    #Contact dermatitis (4/2)  involving buttocks  - apply hydrocortisone ointment bid     -Pain control: Tylenol 975 mg q8h, Celebrex 100 mg miriam a day;  oxycodone 5 mg po q4h prn, oxycodone 5 mg po daily at 8 a.m and 12:30 pm    -GI/Bowel Mgmt: senna/Miralax     - Diet: Regular, Halal      Precautions / PROPHYLAXIS:      - Falls    - Ortho: Weight bearing status: WBAT LLE      - DVT prophylaxis: Lovenox

## 2025-04-08 RX ADMIN — CELECOXIB 100 MILLIGRAM(S): 50 CAPSULE ORAL at 17:40

## 2025-04-08 RX ADMIN — Medication 975 MILLIGRAM(S): at 21:40

## 2025-04-08 RX ADMIN — MIDODRINE HYDROCHLORIDE 5 MILLIGRAM(S): 5 TABLET ORAL at 08:08

## 2025-04-08 RX ADMIN — OXYCODONE HYDROCHLORIDE 5 MILLIGRAM(S): 30 TABLET ORAL at 08:08

## 2025-04-08 RX ADMIN — Medication 2000 UNIT(S): at 12:17

## 2025-04-08 RX ADMIN — Medication 975 MILLIGRAM(S): at 06:18

## 2025-04-08 RX ADMIN — Medication 975 MILLIGRAM(S): at 06:19

## 2025-04-08 RX ADMIN — ENOXAPARIN SODIUM 40 MILLIGRAM(S): 100 INJECTION SUBCUTANEOUS at 21:44

## 2025-04-08 RX ADMIN — LIDOCAINE HYDROCHLORIDE 1 PATCH: 20 JELLY TOPICAL at 12:19

## 2025-04-08 RX ADMIN — CELECOXIB 100 MILLIGRAM(S): 50 CAPSULE ORAL at 06:18

## 2025-04-08 RX ADMIN — LIDOCAINE HYDROCHLORIDE 1 PATCH: 20 JELLY TOPICAL at 20:17

## 2025-04-08 RX ADMIN — LIDOCAINE HYDROCHLORIDE 1 PATCH: 20 JELLY TOPICAL at 21:45

## 2025-04-08 RX ADMIN — MIDODRINE HYDROCHLORIDE 5 MILLIGRAM(S): 5 TABLET ORAL at 12:17

## 2025-04-08 RX ADMIN — ESCITALOPRAM OXALATE 5 MILLIGRAM(S): 20 TABLET ORAL at 12:16

## 2025-04-08 RX ADMIN — Medication 975 MILLIGRAM(S): at 21:45

## 2025-04-08 RX ADMIN — MONTELUKAST SODIUM 10 MILLIGRAM(S): 10 TABLET ORAL at 12:18

## 2025-04-08 RX ADMIN — CELECOXIB 100 MILLIGRAM(S): 50 CAPSULE ORAL at 06:19

## 2025-04-08 RX ADMIN — DONEPEZIL HYDROCHLORIDE 10 MILLIGRAM(S): 5 TABLET ORAL at 21:44

## 2025-04-08 RX ADMIN — Medication 1 DROP(S): at 17:41

## 2025-04-08 RX ADMIN — Medication 1 DROP(S): at 12:20

## 2025-04-08 RX ADMIN — Medication 1 TABLET(S): at 12:18

## 2025-04-08 RX ADMIN — Medication 1 DROP(S): at 21:45

## 2025-04-08 RX ADMIN — OXYCODONE HYDROCHLORIDE 5 MILLIGRAM(S): 30 TABLET ORAL at 13:17

## 2025-04-08 RX ADMIN — Medication 1 DROP(S): at 06:18

## 2025-04-08 RX ADMIN — Medication 1 APPLICATION(S): at 13:46

## 2025-04-08 NOTE — PROGRESS NOTE ADULT - ASSESSMENT
ASSESSMENT/PLAN    70 y.o. F with pmhx of mild dementia, anxiety bilateral cataracts presenting for mechanism fall with Left intertrochanteric hip s/p ORIF 3/24/2025. Postop coarse complicated by orthostatic hypotension postop anemia. Her hip fracture is quite painful.      #Rehab of gait abnormality and decline in function 2/2 L hip IT fx s/p ORIF 3/26/2025  -CT LLE: Acute, impacted left femoral intertrochanteric fracture.  -c/w Tylenol 975 mg q8h ATC, celebrex 100 mg q8AM, and oxycodone 5 mg  q4h prn for pain, oxycodone 5 mg po q8am   -WBAT on the LLE  -PT/OT Eval and treat: Patient requires 3 hrs daily of interdisciplinary inpatient rehab at least 5 days a week.     #Post op anemia, left thigh ecchymosis  -Hgb 11.9 on admission   -Hgb 8.3 3/31  -Monitor CBC, transfuse <7    #Orthostatic hypotension  Close monitoring of her vitals  500 cc NS fluid bolus 3/29  1L NS 3/30  500cc NS fluid bolus 3/31  - encouraged oral fluid intake  - S/p  cc bolus (4/2)  - increase midodrine 2.5 mg bid to 5 mg bid(7:30 am and 12:30 pm) (4/2)  - Symptomatic 4/6 dizziness, headache will give another 500cc bolus with improvement in symptoms    #Headache  #Bilateral eye discomfort  - Patient complaining of headache, noted to be hypotensive, c/w midodrine as above  - Pain management  - Artificial tears for dry eye    #Hyperkalemia  - K 5.1 on 4/7, monitor BM and trend    #MSK Sternal and rib pain, TTP  Lidocaine patch qd  Monitor    #Dementia, Mild  She benefit from and tolerate 3 hrs of daily intensive therapies.   -C/w home med donepezil 10 mg qhs    #Anxiety/depression  -c/w home med escitalopram 5 mg daily    #Contact dermatitis (4/2)  involving buttocks  - apply hydrocortisone ointment bid     -Pain control: Tylenol 975 mg q8h, Celebrex 100 mg miriam a day;  oxycodone 5 mg po q4h prn, oxycodone 5 mg po daily at 8 a.m and 12:30 pm    -GI/Bowel Mgmt: senna/Miralax     - Diet: Regular, Halal      Precautions / PROPHYLAXIS:      - Falls    - Ortho: Weight bearing status: WBAT LLE      - DVT prophylaxis: Lovenox  ASSESSMENT/PLAN    70 y.o. F with pmhx of mild dementia, anxiety bilateral cataracts presenting for mechanism fall with Left intertrochanteric hip s/p ORIF 3/24/2025. Postop coarse complicated by orthostatic hypotension postop anemia. Her hip fracture is quite painful.      #Rehab of gait abnormality and decline in function 2/2 L hip IT fx s/p ORIF 3/26/2025  -CT LLE: Acute, impacted left femoral intertrochanteric fracture.  -c/w Tylenol 975 mg q8h ATC, celebrex 100 mg q8AM, and oxycodone 5 mg  q4h prn for pain, oxycodone 5 mg po q8am   -WBAT on the LLE  -PT/OT Eval and treat: Patient requires 3 hrs daily of interdisciplinary inpatient rehab at least 5 days a week.     #Post op anemia, left thigh ecchymosis  -Hgb 11.9 on admission   -Hgb 8.3 3/31  -Monitor CBC, transfuse <7    #Orthostatic hypotension  Close monitoring of her vitals  500 cc NS fluid bolus 3/29  1L NS 3/30  500cc NS fluid bolus 3/31  - encouraged oral fluid intake  - S/p  cc bolus (4/2)  - increase midodrine 2.5 mg bid to 5 mg bid(7:30 am and 12:30 pm) (4/2)  - Symptomatic 4/6 dizziness, headache will give another 500cc bolus with improvement in symptoms    #Headache (4/5)  #Bilateral eye discomfort (4/5)  - Patient complaining of headache, noted to be hypotensive, c/w midodrine as above  - Pain management  - Artificial tears for dry eye    #Hyperkalemia  - K 5.1 on 4/7   - will monitor     #MSK Sternal and rib pain, TTP  Lidocaine patch qd  Monitor    #Dementia, Mild  She benefit from and tolerate 3 hrs of daily intensive therapies.   -C/w home med donepezil 10 mg qhs    #Anxiety/depression  -c/w home med escitalopram 5 mg daily    #Contact dermatitis (4/2)  involving buttocks  - apply hydrocortisone ointment bid     -Pain control: Tylenol 975 mg q8h, Celebrex 100 mg miriam a day;  oxycodone 5 mg po q4h prn, oxycodone 5 mg po daily at 8 a.m and 12:30 pm    -GI/Bowel Mgmt: senna/Miralax     - Diet: Regular, Halal      Precautions / PROPHYLAXIS:      - Falls    - Ortho: Weight bearing status: WBAT LLE      - DVT prophylaxis: Lovenox  ASSESSMENT/PLAN    70 y.o. F with pmhx of mild dementia, anxiety bilateral cataracts presenting for mechanism fall with Left intertrochanteric hip s/p ORIF 3/24/2025. Postop coarse complicated by orthostatic hypotension postop anemia. Her hip fracture is quite painful.      #Rehab of gait abnormality and decline in function 2/2 L hip IT fx s/p ORIF 3/26/2025  -CT LLE: Acute, impacted left femoral intertrochanteric fracture.  -c/w Tylenol 975 mg q8h ATC, celebrex 100 mg q8AM, and oxycodone 5 mg  q4h prn for pain, oxycodone 5 mg po q8am   -WBAT on the LLE  -PT/OT Eval and treat: Patient requires 3 hrs daily of interdisciplinary inpatient rehab at least 5 days a week.     #Post op anemia, left thigh ecchymosis  -Hgb 11.9 on admission   -Hgb 8.3 3/31  -Monitor CBC, transfuse <7    #Orthostatic hypotension  Close monitoring of her vitals  500 cc NS fluid bolus 3/29  1L NS 3/30  500cc NS fluid bolus 3/31  - encouraged oral fluid intake  - S/p  cc bolus (4/2)  - increase midodrine 2.5 mg bid to 5 mg bid(7:30 am and 12:30 pm) (4/2)  - Symptomatic 4/6 dizziness, headache will give another 500cc bolus with improvement in symptoms    #Headache (4/5)  #Bilateral eye discomfort (4/5)  - Patient complaining of headache, noted to be hypotensive, c/w midodrine as above  - Pain management  - Artificial tears for dry eye    #Hyperkalemia  - K 5.1 on 4/7   - will monitor     #MSK Sternal and rib pain, TTP  Lidocaine patch qd  Monitor    #Dementia, Mild  She benefit from and tolerate 3 hrs of daily intensive therapies.   -C/w home med donepezil 10 mg qhs    #Anxiety/depression  -c/w home med escitalopram 5 mg daily    #Contact dermatitis (4/2)  involving buttocks  - apply hydrocortisone ointment bid     -Pain control: Tylenol 975 mg q8h, Celebrex 100 mg twice a day;  oxycodone 5 mg po q4h prn, oxycodone 5 mg po daily at 8 a.m and 12:30 pm    -GI/Bowel Mgmt: senna/Miralax     - Diet: Regular, Halal      Precautions / PROPHYLAXIS:      - Falls    - Ortho: Weight bearing status: WBAT LLE      - DVT prophylaxis: Lovenox

## 2025-04-08 NOTE — PROGRESS NOTE ADULT - SUBJECTIVE AND OBJECTIVE BOX
Patient is a 70 y.o. old female who presents with a chief complaint of Rehab of gait abnormality and decline in function 2/2 L hip IT fx s/p ORIF 3/26/2025 (28 Mar 2025 14:30)      HPI:  71 yo F with PMH of mild dementia, anxiety, bilateral cataracts presented to the ED 3/24/2025 s/p fall. Son stated that the pt was walking down stairs and slipped on her socks while descending the final 3 steps and fell on her left side. No head trauma or LOC. In ED CT LLE showed acute, impacted left femoral intertrochanteric fracture. Patient was evaluated by orthopedics and is s/p L hip ORIF 3/24/2025. Postop course complicated by postop anemia and orthostatic hypotension s/p IVF, will continue to monitor in acute rehab.     Imaging:   XR Left Femur: Comminuted trochanteric fracture.  CTH negative  CT Cspine negative  CTAP: Acute, impacted left femoral intertrochanteric fracture. Sacral Tarlov cysts.  CT LLE: Acute, impacted left femoral intertrochanteric fracture. Soft tissues unremarkable. No hip dislocation. Degenerative change of left hip and left knee.      Prior to admission, the patient was independent in ADLs and ambulation without an assistive device. Patient now requires assistance with ambulation and ADLs 2/2 to L Hip ORIF. There is a significant functional decline from baseline. To return home it is in the patient’s best interest to have acute inpatient rehabilitative services to undergo intensive interdisciplinary therapy. Furthermore, the patient is motivated and is able to tolerate up to 3 hours of daily therapy for 5-6 days a week for a total of 15 hours a week. Evaluated by Physiatry and deemed to be an excellent candidate for admission to  for acute inpatient rehab. Admitted to Acute Rehab on 3/27/2025.    Pt seen and examined by Physiatry and is currently functioning at maxA bed mobility, modA transfers, 20’ RW modA, UBD standy-by, LBD modA.    LS: lives with family in  with 5STE and 1FOS Inside  PLOF: independent in ADLs and ambulate with no assistive device     (28 Mar 2025 14:30)    TODAY'S SUBJECTIVE & REVIEW OF SYMPTOMS  Swedish  with therapist.  Patient seen and examined this morning in gym. Some complaints of bilateral knee pain/crepitus was having wraps done with therapist. Feeling well has some pain but manageable.    No overnight events.   Had some complaints over weekend including dizziness, headache and bilateral eye pain, better after IV fluids.   Tolerating PT/OT and oral intake well.   Voiding bowel/bladder spontaneously and regularly.  Vital signs reviewed.     As of 4/3-pt reported chest pain which was reproducible as per NP note; celebrex increased from 100 mg qd to q12.    CLOF: Partial A for bed mob; TA/RW for transfers; amb 20 ft x 3 using RW/TA    PHYSICAL EXAM  Vital Signs (24 Hrs):  T(C): 36.3 (04-08-25 @ 05:53), Max: 37.1 (04-07-25 @ 20:35)  HR: 58 (04-08-25 @ 08:18) (57 - 68)  BP: 102/62 (04-08-25 @ 08:18) (102/62 - 124/65)  RR: 18 (04-08-25 @ 05:53) (18 - 19)  SpO2: 100% (04-08-25 @ 05:53) (100% - 100%)  Wt(kg): --  Daily     Daily     I&O's Summary      General:[x   ] NAD, Resting Comfortable,   [   ] other:                                HEENT: [x   ] NC/AT, EOMI, PERRL , Normal Conjunctivae,   [   ] other:  Cardio: [  x ] RRR, no murmur,   [   ] other:                              Pulm: [ x  ] No Respiratory Distress,  Lungs CTAB,   [   ] other:                       Abdomen: [x   ]ND/NT, Soft,   [   ] other:    : [ x  ] NO ELIZALDE CATHETER, [   ] ELIZALDE CATHETER- no meatal tear, no discharge, [   ] other:                                            MSK: [   ] No joint swelling, Full ROM,   [  x ] other: L thigh/hip swelling, L hip TTP, L hip ecchymosis                                         Ext: [ x  ]No C/C/E, No calf tenderness,   [   ]other:    Skin: [   ]intact,   [  x ] other: surgical dressing c/d/i                                                                   Neurological Examination:  Cognitive: [    ] AAO x 3,   [x    ]  other: not oriented to year, she knows the year. She is  pleasant and able to give her history. She follows simple commands  well.                                                                Attention:  [ x   ] intact,   [    ]  other:                            Mood/Affect: [   x ] wnl,    [    ]  other:                                                                             CN II - XII:  [  x  ] intact,  [    ] other:                                                                                        Motor:   RIGHT UE: [ x  ] WNL,  [   ] other:  LEFT    UE: [ x  ] WNL,  [   ] other:  RIGHT LE: [ x  ] WNL,  [   ] other:   LEFT    LE: [   ] WNL,  [  x ] other: HF 1+/5, KE/KF 3-/5, PF/DF 5/5 limited 2/2 pain    DTRs: [ x  ]symmetric, [   ] other:                                                                    Sensory: [  x  ] Intact to light touch,   [    ] other:    MEDICATIONS  (STANDING):  acetaminophen     Tablet .. 975 milliGRAM(s) Oral every 8 hours  artificial tears (preservative free) Ophthalmic Solution 1 Drop(s) Both EYES every 6 hours  celecoxib 100 milliGRAM(s) Oral every 12 hours  chlorhexidine 2% Cloths 1 Application(s) Topical daily  cholecalciferol 2000 Unit(s) Oral daily  donepezil 10 milliGRAM(s) Oral at bedtime  enoxaparin Injectable 40 milliGRAM(s) SubCutaneous every 24 hours  escitalopram 5 milliGRAM(s) Oral daily  lidocaine   4% Patch 1 Patch Transdermal daily  midodrine. 5 milliGRAM(s) Oral <User Schedule>  montelukast 10 milliGRAM(s) Oral daily  multivitamin 1 Tablet(s) Oral daily  oxyCODONE    IR 5 milliGRAM(s) Oral <User Schedule>  polyethylene glycol 3350 17 Gram(s) Oral at bedtime    MEDICATIONS  (PRN):  albuterol    90 MICROgram(s) HFA Inhaler 2 Puff(s) Inhalation every 6 hours PRN Shortness of Breath and/or Wheezing  diphenhydrAMINE 50 milliGRAM(s) Oral every 6 hours PRN Rash and/or Itching  hydrocortisone 1% Cream 1 Application(s) Topical two times a day PRN Rash and/or Itching  melatonin 5 milliGRAM(s) Oral at bedtime PRN Insomnia  oxyCODONE    IR 5 milliGRAM(s) Oral every 4 hours PRN Moderate Pain (4 - 6)    RECENT LABS/IMAGING                        8.7    6.08  )-----------( 409      ( 07 Apr 2025 05:15 )             28.0     04-07    137  |  103  |  17  ----------------------------<  89  5.1[H]   |  26  |  0.6[L]    Ca    9.6      07 Apr 2025 05:15  Mg     2.2     04-07    TPro  7.0  /  Alb  3.8  /  TBili  0.3  /  DBili  x   /  AST  47[H]  /  ALT  58[H]  /  AlkPhos  174[H]  04-07      Urinalysis Basic - ( 07 Apr 2025 05:15 )    Color: x / Appearance: x / SG: x / pH: x  Gluc: 89 mg/dL / Ketone: x  / Bili: x / Urobili: x   Blood: x / Protein: x / Nitrite: x   Leuk Esterase: x / RBC: x / WBC x   Sq Epi: x / Non Sq Epi: x / Bacteria: x Patient is a 70 y.o. old female who presents with a chief complaint of Rehab of gait abnormality and decline in function 2/2 L hip IT fx s/p ORIF 3/26/2025 (28 Mar 2025 14:30)      HPI:  71 yo F with PMH of mild dementia, anxiety, bilateral cataracts presented to the ED 3/24/2025 s/p fall. Son stated that the pt was walking down stairs and slipped on her socks while descending the final 3 steps and fell on her left side. No head trauma or LOC. In ED CT LLE showed acute, impacted left femoral intertrochanteric fracture. Patient was evaluated by orthopedics and is s/p L hip ORIF 3/24/2025. Postop course complicated by postop anemia and orthostatic hypotension s/p IVF, will continue to monitor in acute rehab.     Imaging:   XR Left Femur: Comminuted trochanteric fracture.  CTH negative  CT Cspine negative  CTAP: Acute, impacted left femoral intertrochanteric fracture. Sacral Tarlov cysts.  CT LLE: Acute, impacted left femoral intertrochanteric fracture. Soft tissues unremarkable. No hip dislocation. Degenerative change of left hip and left knee.      Prior to admission, the patient was independent in ADLs and ambulation without an assistive device. Patient now requires assistance with ambulation and ADLs 2/2 to L Hip ORIF. There is a significant functional decline from baseline. To return home it is in the patient’s best interest to have acute inpatient rehabilitative services to undergo intensive interdisciplinary therapy. Furthermore, the patient is motivated and is able to tolerate up to 3 hours of daily therapy for 5-6 days a week for a total of 15 hours a week. Evaluated by Physiatry and deemed to be an excellent candidate for admission to  for acute inpatient rehab. Admitted to Acute Rehab on 3/27/2025.    Pt seen and examined by Physiatry and is currently functioning at maxA bed mobility, modA transfers, 20’ RW modA, UBD standy-by, LBD modA.    LS: lives with family in  with 5STE and 1FOS Inside  PLOF: independent in ADLs and ambulate with no assistive device     (28 Mar 2025 14:30)    TODAY'S SUBJECTIVE & REVIEW OF SYMPTOMS  Daughter in law visiting her during therapy session.  Patient seen and examined this morning in gym.   No overnight events.   No new complaints; lightheadedness improving  Tolerating PT/OT and oral intake well.   Voiding bowel/bladder spontaneously and regularly.  Vital signs reviewed.       CLOF: Partial A for bed mob; TA/RW for transfers; amb 20 ft x 3 using RW/TA    PHYSICAL EXAM  Vital Signs (24 Hrs):  T(C): 36.3 (04-08-25 @ 05:53), Max: 37.1 (04-07-25 @ 20:35)  HR: 58 (04-08-25 @ 08:18) (57 - 68)  BP: 102/62 (04-08-25 @ 08:18) (102/62 - 124/65)  RR: 18 (04-08-25 @ 05:53) (18 - 19)  SpO2: 100% (04-08-25 @ 05:53) (100% - 100%)  Wt(kg): --  Daily     Daily     I&O's Summary      General:[x   ] NAD, Resting Comfortable,   [   ] other:                                HEENT: [x   ] NC/AT, EOMI, PERRL , Normal Conjunctivae,   [   ] other:  Cardio: [  x ] RRR, no murmur,   [   ] other:                              Pulm: [ x  ] No Respiratory Distress,  Lungs CTAB,   [   ] other:                       Abdomen: [x   ]ND/NT, Soft,   [   ] other:    : [ x  ] NO ELIZALDE CATHETER, [   ] ELIZALDE CATHETER- no meatal tear, no discharge, [   ] other:                                            MSK: [   ] No joint swelling, Full ROM,   [  x ] other: L thigh/hip swelling, L hip TTP, L hip ecchymosis                                         Ext: [ x  ]No C/C/E, No calf tenderness,   [   ]other:    Skin: [   ]intact,   [  x ] other: surgical dressing c/d/i                                                                   Neurological Examination:  Cognitive: [    ] AAO x 3,   [x    ]  other: not oriented to year, she knows the year. She is  pleasant and able to give her history. She follows simple commands  well.                                                                Attention:  [ x   ] intact,   [    ]  other:                            Mood/Affect: [   x ] wnl,    [    ]  other:                                                                             CN II - XII:  [  x  ] intact,  [    ] other:                                                                                        Motor:   RIGHT UE: [ x  ] WNL,  [   ] other:  LEFT    UE: [ x  ] WNL,  [   ] other:  RIGHT LE: [ x  ] WNL,  [   ] other:   LEFT    LE: [   ] WNL,  [  x ] other: HF 1+/5, KE/KF 3-/5, PF/DF 5/5 limited 2/2 pain    DTRs: [ x  ]symmetric, [   ] other:                                                                    Sensory: [  x  ] Intact to light touch,   [    ] other:    MEDICATIONS  (STANDING):  acetaminophen     Tablet .. 975 milliGRAM(s) Oral every 8 hours  artificial tears (preservative free) Ophthalmic Solution 1 Drop(s) Both EYES every 6 hours  celecoxib 100 milliGRAM(s) Oral every 12 hours  chlorhexidine 2% Cloths 1 Application(s) Topical daily  cholecalciferol 2000 Unit(s) Oral daily  donepezil 10 milliGRAM(s) Oral at bedtime  enoxaparin Injectable 40 milliGRAM(s) SubCutaneous every 24 hours  escitalopram 5 milliGRAM(s) Oral daily  lidocaine   4% Patch 1 Patch Transdermal daily  midodrine. 5 milliGRAM(s) Oral <User Schedule>  montelukast 10 milliGRAM(s) Oral daily  multivitamin 1 Tablet(s) Oral daily  oxyCODONE    IR 5 milliGRAM(s) Oral <User Schedule>  polyethylene glycol 3350 17 Gram(s) Oral at bedtime    MEDICATIONS  (PRN):  albuterol    90 MICROgram(s) HFA Inhaler 2 Puff(s) Inhalation every 6 hours PRN Shortness of Breath and/or Wheezing  diphenhydrAMINE 50 milliGRAM(s) Oral every 6 hours PRN Rash and/or Itching  hydrocortisone 1% Cream 1 Application(s) Topical two times a day PRN Rash and/or Itching  melatonin 5 milliGRAM(s) Oral at bedtime PRN Insomnia  oxyCODONE    IR 5 milliGRAM(s) Oral every 4 hours PRN Moderate Pain (4 - 6)    RECENT LABS/IMAGING                        8.7    6.08  )-----------( 409      ( 07 Apr 2025 05:15 )             28.0     04-07    137  |  103  |  17  ----------------------------<  89  5.1[H]   |  26  |  0.6[L]    Ca    9.6      07 Apr 2025 05:15  Mg     2.2     04-07    TPro  7.0  /  Alb  3.8  /  TBili  0.3  /  DBili  x   /  AST  47[H]  /  ALT  58[H]  /  AlkPhos  174[H]  04-07      Urinalysis Basic - ( 07 Apr 2025 05:15 )    Color: x / Appearance: x / SG: x / pH: x  Gluc: 89 mg/dL / Ketone: x  / Bili: x / Urobili: x   Blood: x / Protein: x / Nitrite: x   Leuk Esterase: x / RBC: x / WBC x   Sq Epi: x / Non Sq Epi: x / Bacteria: x

## 2025-04-08 NOTE — PROGRESS NOTE ADULT - ATTENDING COMMENTS
Patient seen and examined with the resident. We discussed the case. I have directed the care. I edited the note. The patient requires acute rehab with 3 hours of daily therapies at least 5 out of 7 days and close physiatry follow up.  #Rehab of gait abnormality and decline in function 2/2 L hip IT fx s/p ORIF 3/26/2025  -CT LLE: Acute, impacted left femoral intertrochanteric fracture.  -c/w Tylenol 975 mg q8h ATC, celebrex 100 mg q8AM, and oxycodone 5 mg  q4h prn for pain, oxycodone 5 mg po q8am   -WBAT on the LLE  -PT/OT Eval and treat: Patient requires 3 hrs daily of interdisciplinary inpatient rehab at least 5 days a week.     #Post op anemia, left thigh ecchymosis  -Hgb 11.9 on admission   -Hgb 8.3 3/31  -Monitor CBC, transfuse <7    #Orthostatic hypotension  Close monitoring of her vitals  500 cc NS fluid bolus 3/29  1L NS 3/30  500cc NS fluid bolus 3/31  - encouraged oral fluid intake  - S/p  cc bolus (4/2)  - increase midodrine 2.5 mg bid to 5 mg bid(7:30 am and 12:30 pm) (4/2)  - Symptomatic 4/6 dizziness, headache will give another 500cc bolus with improvement in symptoms    #Headache (4/5)  #Bilateral eye discomfort (4/5)  - Patient complaining of headache, noted to be hypotensive, c/w midodrine as above  - Pain management  - Artificial tears for dry eye    #Hyperkalemia  - K 5.1 on 4/7   - will monitor     #MSK Sternal and rib pain, TTP  Lidocaine patch qd  Monitor    #Dementia, Mild  She benefit from and tolerate 3 hrs of daily intensive therapies.   -C/w home med donepezil 10 mg qhs    #Anxiety/depression  -c/w home med escitalopram 5 mg daily    #Contact dermatitis (4/2)  involving buttocks  - apply hydrocortisone ointment bid     -Pain control: Tylenol 975 mg q8h, Celebrex 100 mg twice a day;  oxycodone 5 mg po q4h prn, oxycodone 5 mg po daily at 8 a.m and 12:30 pm    -GI/Bowel Mgmt: senna/Miralax

## 2025-04-09 RX ADMIN — Medication 975 MILLIGRAM(S): at 13:12

## 2025-04-09 RX ADMIN — MIDODRINE HYDROCHLORIDE 5 MILLIGRAM(S): 5 TABLET ORAL at 08:39

## 2025-04-09 RX ADMIN — Medication 1 DROP(S): at 17:51

## 2025-04-09 RX ADMIN — DONEPEZIL HYDROCHLORIDE 10 MILLIGRAM(S): 5 TABLET ORAL at 22:38

## 2025-04-09 RX ADMIN — Medication 2000 UNIT(S): at 13:13

## 2025-04-09 RX ADMIN — Medication 1 DROP(S): at 13:14

## 2025-04-09 RX ADMIN — Medication 1 DROP(S): at 06:32

## 2025-04-09 RX ADMIN — LIDOCAINE HYDROCHLORIDE 1 PATCH: 20 JELLY TOPICAL at 19:00

## 2025-04-09 RX ADMIN — MONTELUKAST SODIUM 10 MILLIGRAM(S): 10 TABLET ORAL at 13:12

## 2025-04-09 RX ADMIN — OXYCODONE HYDROCHLORIDE 5 MILLIGRAM(S): 30 TABLET ORAL at 14:50

## 2025-04-09 RX ADMIN — CELECOXIB 100 MILLIGRAM(S): 50 CAPSULE ORAL at 17:50

## 2025-04-09 RX ADMIN — OXYCODONE HYDROCHLORIDE 5 MILLIGRAM(S): 30 TABLET ORAL at 08:39

## 2025-04-09 RX ADMIN — ENOXAPARIN SODIUM 40 MILLIGRAM(S): 100 INJECTION SUBCUTANEOUS at 22:40

## 2025-04-09 RX ADMIN — Medication 40 MILLIGRAM(S): at 13:11

## 2025-04-09 RX ADMIN — ESCITALOPRAM OXALATE 5 MILLIGRAM(S): 20 TABLET ORAL at 13:14

## 2025-04-09 RX ADMIN — Medication 975 MILLIGRAM(S): at 22:37

## 2025-04-09 RX ADMIN — MIDODRINE HYDROCHLORIDE 5 MILLIGRAM(S): 5 TABLET ORAL at 13:12

## 2025-04-09 RX ADMIN — CELECOXIB 100 MILLIGRAM(S): 50 CAPSULE ORAL at 06:32

## 2025-04-09 RX ADMIN — OXYCODONE HYDROCHLORIDE 5 MILLIGRAM(S): 30 TABLET ORAL at 09:50

## 2025-04-09 RX ADMIN — Medication 975 MILLIGRAM(S): at 23:00

## 2025-04-09 RX ADMIN — Medication 975 MILLIGRAM(S): at 14:34

## 2025-04-09 RX ADMIN — LIDOCAINE HYDROCHLORIDE 1 PATCH: 20 JELLY TOPICAL at 13:15

## 2025-04-09 RX ADMIN — OXYCODONE HYDROCHLORIDE 5 MILLIGRAM(S): 30 TABLET ORAL at 13:12

## 2025-04-09 RX ADMIN — Medication 1 TABLET(S): at 13:12

## 2025-04-09 RX ADMIN — Medication 975 MILLIGRAM(S): at 06:33

## 2025-04-09 RX ADMIN — CELECOXIB 100 MILLIGRAM(S): 50 CAPSULE ORAL at 06:33

## 2025-04-09 NOTE — PROGRESS NOTE ADULT - ATTENDING COMMENTS
Patient seen and examined with the resident. We discussed the case. I have directed the care. I edited the note. The patient requires acute rehab with 3 hours of daily therapies at least 5 out of 7 days and close physiatry follow up. I participated in the rehab interdisciplinary team meeting; the patient progressed to ambulate 50 ft RW steady assist. Pain is being managed with acetaminophen, Celebrex and oxycodone.   #Rehab of gait abnormality and decline in function 2/2 L hip IT fx s/p ORIF 3/26/2025  -CT LLE: Acute, impacted left femoral intertrochanteric fracture.  -c/w Tylenol 975 mg q8h ATC, celebrex 100 mg q8AM, and oxycodone 5 mg  q4h prn for pain, oxycodone 5 mg po q8am   -WBAT on the LLE  -PT/OT Eval and treat: Patient requires 3 hrs daily of interdisciplinary inpatient rehab at least 5 days a week.     #Post op anemia, left thigh ecchymosis  -Hgb 11.9 on admission   -Hgb 8.7 (4/7)  -Monitor CBC, transfuse <7    #Orthostatic hypotension  Close monitoring of her vitals  500 cc NS fluid bolus 3/29  1L NS 3/30  500cc NS fluid bolus 3/31  - encouraged oral fluid intake  - S/p  cc bolus (4/2)  - increase midodrine 2.5 mg bid to 5 mg bid(7:30 am and 12:30 pm) (4/2)  - Symptomatic 4/6 dizziness, headache will give another 500cc bolus with improvement in symptoms    #Headache (4/5) resolved  #Bilateral eye discomfort (4/5) resolved  - Patient complaining of headache, noted to be hypotensive, c/w midodrine as above  - Pain management  - Artificial tears for dry eye    #Hyperkalemia  - K 5.1 on 4/7   - will monitor     #MSK Sternal and rib pain, TTP  Lidocaine patch qd  Monitor    #Dementia, Mild  She benefit from and tolerate 3 hrs of daily intensive therapies.   -C/w home med donepezil 10 mg qhs    #Anxiety/depression  -c/w home med escitalopram 5 mg daily    #Contact dermatitis (4/2)  involving buttocks  - apply hydrocortisone ointment bid     -Pain control: Tylenol 975 mg q8h, Celebrex 100 mg twice a day;  oxycodone 5 mg po q4h prn, oxycodone 5 mg po daily at 8 a.m and 12:30 pm

## 2025-04-09 NOTE — PROGRESS NOTE ADULT - SUBJECTIVE AND OBJECTIVE BOX
Patient is a 70 y.o. old female who presents with a chief complaint of Rehab of gait abnormality and decline in function 2/2 L hip IT fx s/p ORIF 3/26/2025 (28 Mar 2025 14:30)      HPI:  71 yo F with PMH of mild dementia, anxiety, bilateral cataracts presented to the ED 3/24/2025 s/p fall. Son stated that the pt was walking down stairs and slipped on her socks while descending the final 3 steps and fell on her left side. No head trauma or LOC. In ED CT LLE showed acute, impacted left femoral intertrochanteric fracture. Patient was evaluated by orthopedics and is s/p L hip ORIF 3/24/2025. Postop course complicated by postop anemia and orthostatic hypotension s/p IVF, will continue to monitor in acute rehab.     Imaging:   XR Left Femur: Comminuted trochanteric fracture.  CTH negative  CT Cspine negative  CTAP: Acute, impacted left femoral intertrochanteric fracture. Sacral Tarlov cysts.  CT LLE: Acute, impacted left femoral intertrochanteric fracture. Soft tissues unremarkable. No hip dislocation. Degenerative change of left hip and left knee.      Prior to admission, the patient was independent in ADLs and ambulation without an assistive device. Patient now requires assistance with ambulation and ADLs 2/2 to L Hip ORIF. There is a significant functional decline from baseline. To return home it is in the patient’s best interest to have acute inpatient rehabilitative services to undergo intensive interdisciplinary therapy. Furthermore, the patient is motivated and is able to tolerate up to 3 hours of daily therapy for 5-6 days a week for a total of 15 hours a week. Evaluated by Physiatry and deemed to be an excellent candidate for admission to  for acute inpatient rehab. Admitted to Acute Rehab on 3/27/2025.    Pt seen and examined by Physiatry and is currently functioning at maxA bed mobility, modA transfers, 20’ RW modA, UBD standy-by, LBD modA.    LS: lives with family in  with 5STE and 1FOS Inside  PLOF: independent in ADLs and ambulate with no assistive device     (28 Mar 2025 14:30)    TODAY'S SUBJECTIVE & REVIEW OF SYMPTOMS  [place]  ID [id number]  Patient seen and examined this morning at  bedside  No overnight events.   No new complaints;  Tolerating PT/OT and oral intake well.   Voiding bowel/bladder spontaneously and regularly.  Vital signs reviewed.       CLOF: Partial A for bed mob; TA/RW for transfers; amb 20 ft x 3 using RW/TA    PHYSICAL EXAM  Vital Signs (24 Hrs):  T(C): 36.4 (04-09-25 @ 05:22), Max: 36.6 (04-08-25 @ 12:10)  HR: 62 (04-09-25 @ 05:22) (51 - 62)  BP: 121/74 (04-09-25 @ 05:22) (101/66 - 121/74)  RR: 18 (04-09-25 @ 05:22) (18 - 19)  SpO2: 98% (04-09-25 @ 05:22) (98% - 100%)  Wt(kg): --  Daily     Daily     I&O's Summary    08 Apr 2025 07:01  -  09 Apr 2025 07:00  --------------------------------------------------------  IN: 120 mL / OUT: 0 mL / NET: 120 mL        General:[x   ] NAD, Resting Comfortable,   [   ] other:                                HEENT: [x   ] NC/AT, EOMI, PERRL , Normal Conjunctivae,   [   ] other:  Cardio: [  x ] RRR, no murmur,   [   ] other:                              Pulm: [ x  ] No Respiratory Distress,  Lungs CTAB,   [   ] other:                       Abdomen: [x   ]ND/NT, Soft,   [   ] other:    : [ x  ] NO ELIZALDE CATHETER, [   ] ELIZALDE CATHETER- no meatal tear, no discharge, [   ] other:                                            MSK: [   ] No joint swelling, Full ROM,   [  x ] other: L thigh/hip swelling, L hip TTP, L hip ecchymosis                                         Ext: [ x  ]No C/C/E, No calf tenderness,   [   ]other:    Skin: [   ]intact,   [  x ] other: surgical dressing c/d/i                                                                   Neurological Examination:  Cognitive: [    ] AAO x 3,   [x    ]  other: not oriented to year, she knows the year. She is  pleasant and able to give her history. She follows simple commands  well.                                                                Attention:  [ x   ] intact,   [    ]  other:                            Mood/Affect: [   x ] wnl,    [    ]  other:                                                                             CN II - XII:  [  x  ] intact,  [    ] other:                                                                                        Motor:   RIGHT UE: [ x  ] WNL,  [   ] other:  LEFT    UE: [ x  ] WNL,  [   ] other:  RIGHT LE: [ x  ] WNL,  [   ] other:   LEFT    LE: [   ] WNL,  [  x ] other: HF 1+/5, KE/KF 3-/5, PF/DF 5/5 limited 2/2 pain    DTRs: [ x  ]symmetric, [   ] other:                                                                    Sensory: [  x  ] Intact to light touch,   [    ] other:    MEDICATIONS  (STANDING):  acetaminophen     Tablet .. 975 milliGRAM(s) Oral every 8 hours  artificial tears (preservative free) Ophthalmic Solution 1 Drop(s) Both EYES every 6 hours  celecoxib 100 milliGRAM(s) Oral every 12 hours  chlorhexidine 2% Cloths 1 Application(s) Topical daily  cholecalciferol 2000 Unit(s) Oral daily  donepezil 10 milliGRAM(s) Oral at bedtime  enoxaparin Injectable 40 milliGRAM(s) SubCutaneous every 24 hours  escitalopram 5 milliGRAM(s) Oral daily  lidocaine   4% Patch 1 Patch Transdermal daily  midodrine. 5 milliGRAM(s) Oral <User Schedule>  montelukast 10 milliGRAM(s) Oral daily  multivitamin 1 Tablet(s) Oral daily  oxyCODONE    IR 5 milliGRAM(s) Oral <User Schedule>  polyethylene glycol 3350 17 Gram(s) Oral at bedtime    MEDICATIONS  (PRN):  albuterol    90 MICROgram(s) HFA Inhaler 2 Puff(s) Inhalation every 6 hours PRN Shortness of Breath and/or Wheezing  diphenhydrAMINE 50 milliGRAM(s) Oral every 6 hours PRN Rash and/or Itching  hydrocortisone 1% Cream 1 Application(s) Topical two times a day PRN Rash and/or Itching  melatonin 5 milliGRAM(s) Oral at bedtime PRN Insomnia  oxyCODONE    IR 5 milliGRAM(s) Oral every 4 hours PRN Moderate Pain (4 - 6)    RECENT LABS/IMAGING                        8.7    6.08  )-----------( 409      ( 07 Apr 2025 05:15 )             28.0     04-07    137  |  103  |  17  ----------------------------<  89  5.1[H]   |  26  |  0.6[L]    Ca    9.6      07 Apr 2025 05:15  Mg     2.2     04-07    TPro  7.0  /  Alb  3.8  /  TBili  0.3  /  DBili  x   /  AST  47[H]  /  ALT  58[H]  /  AlkPhos  174[H]  04-07      Urinalysis Basic - ( 07 Apr 2025 05:15 )    Color: x / Appearance: x / SG: x / pH: x  Gluc: 89 mg/dL / Ketone: x  / Bili: x / Urobili: x   Blood: x / Protein: x / Nitrite: x   Leuk Esterase: x / RBC: x / WBC x   Sq Epi: x / Non Sq Epi: x / Bacteria: x

## 2025-04-09 NOTE — PROGRESS NOTE ADULT - TIME BILLING
I participated in the rehab interdisciplinary team meeting; the patient progressed to ambulate 50 ft RW steady assist. Pain is being managed with acetaminophen, Celebrex and oxycodone.
I participated in the rehab interdisciplinary team meeting; the patient progressed to ambulate 30 ft RW partial assist. I increased the midodrine to 5 mg po BID for persisting orthostasis. I added hydrocortisone cream 1% BID for her buttock rash/contact dermatitis. A NS fluid bolus was ordered for orthostasis.

## 2025-04-09 NOTE — CHART NOTE - NSCHARTNOTEFT_GEN_A_CORE
Can you do a LMN for Staci Miles   Due to gait abnormality secondary to Lt. Hip Fracture, the patient will require a commode for safe DC because the patient is confined to a single level without a bathroom. Due to gait abnormality secondary to Lt. Hip Fracture, the patient will require a commode for safe DC because the patient is confined to a single level without a bathroom.       Due to gait abnormality secondary to Lt. Hip Fracture, the patient will require a rolling walker for a safe DC. A RW can sufficiently resolve the patient's mobility deficit, and the patient can use the RW safely.

## 2025-04-09 NOTE — PROGRESS NOTE ADULT - ASSESSMENT
ASSESSMENT/PLAN    70 y.o. F with pmhx of mild dementia, anxiety bilateral cataracts presenting for mechanism fall with Left intertrochanteric hip s/p ORIF 3/24/2025. Postop coarse complicated by orthostatic hypotension postop anemia. Her hip fracture is quite painful.      #Rehab of gait abnormality and decline in function 2/2 L hip IT fx s/p ORIF 3/26/2025  -CT LLE: Acute, impacted left femoral intertrochanteric fracture.  -c/w Tylenol 975 mg q8h ATC, celebrex 100 mg q8AM, and oxycodone 5 mg  q4h prn for pain, oxycodone 5 mg po q8am   -WBAT on the LLE  -PT/OT Eval and treat: Patient requires 3 hrs daily of interdisciplinary inpatient rehab at least 5 days a week.     #Post op anemia, left thigh ecchymosis  -Hgb 11.9 on admission   -Hgb 8.7 (4/7)  -Monitor CBC, transfuse <7    #Orthostatic hypotension  Close monitoring of her vitals  500 cc NS fluid bolus 3/29  1L NS 3/30  500cc NS fluid bolus 3/31  - encouraged oral fluid intake  - S/p  cc bolus (4/2)  - increase midodrine 2.5 mg bid to 5 mg bid(7:30 am and 12:30 pm) (4/2)  - Symptomatic 4/6 dizziness, headache will give another 500cc bolus with improvement in symptoms    #Headache (4/5) resolved  #Bilateral eye discomfort (4/5) resolved  - Patient complaining of headache, noted to be hypotensive, c/w midodrine as above  - Pain management  - Artificial tears for dry eye    #Hyperkalemia  - K 5.1 on 4/7   - will monitor     #MSK Sternal and rib pain, TTP  Lidocaine patch qd  Monitor    #Dementia, Mild  She benefit from and tolerate 3 hrs of daily intensive therapies.   -C/w home med donepezil 10 mg qhs    #Anxiety/depression  -c/w home med escitalopram 5 mg daily    #Contact dermatitis (4/2)  involving buttocks  - apply hydrocortisone ointment bid     -Pain control: Tylenol 975 mg q8h, Celebrex 100 mg twice a day;  oxycodone 5 mg po q4h prn, oxycodone 5 mg po daily at 8 a.m and 12:30 pm    -GI/Bowel Mgmt: senna/Miralax     - Diet: Regular, Halal      Precautions / PROPHYLAXIS:      - Falls    - Ortho: Weight bearing status: WBAT LLE      - DVT prophylaxis: Lovenox  Detail Level: Generalized

## 2025-04-10 ENCOUNTER — TRANSCRIPTION ENCOUNTER (OUTPATIENT)
Age: 70
End: 2025-04-10

## 2025-04-10 RX ORDER — OXYCODONE HYDROCHLORIDE 30 MG/1
5 TABLET ORAL EVERY 4 HOURS
Refills: 0 | Status: DISCONTINUED | OUTPATIENT
Start: 2025-04-13 | End: 2025-04-14

## 2025-04-10 RX ADMIN — LIDOCAINE HYDROCHLORIDE 1 PATCH: 20 JELLY TOPICAL at 19:00

## 2025-04-10 RX ADMIN — MONTELUKAST SODIUM 10 MILLIGRAM(S): 10 TABLET ORAL at 14:10

## 2025-04-10 RX ADMIN — ENOXAPARIN SODIUM 40 MILLIGRAM(S): 100 INJECTION SUBCUTANEOUS at 21:20

## 2025-04-10 RX ADMIN — LIDOCAINE HYDROCHLORIDE 1 PATCH: 20 JELLY TOPICAL at 00:32

## 2025-04-10 RX ADMIN — Medication 1 DROP(S): at 00:15

## 2025-04-10 RX ADMIN — ESCITALOPRAM OXALATE 5 MILLIGRAM(S): 20 TABLET ORAL at 15:22

## 2025-04-10 RX ADMIN — Medication 2000 UNIT(S): at 14:10

## 2025-04-10 RX ADMIN — POLYETHYLENE GLYCOL 3350 17 GRAM(S): 17 POWDER, FOR SOLUTION ORAL at 21:19

## 2025-04-10 RX ADMIN — OXYCODONE HYDROCHLORIDE 5 MILLIGRAM(S): 30 TABLET ORAL at 07:44

## 2025-04-10 RX ADMIN — LIDOCAINE HYDROCHLORIDE 1 PATCH: 20 JELLY TOPICAL at 14:09

## 2025-04-10 RX ADMIN — CELECOXIB 100 MILLIGRAM(S): 50 CAPSULE ORAL at 06:30

## 2025-04-10 RX ADMIN — DONEPEZIL HYDROCHLORIDE 10 MILLIGRAM(S): 5 TABLET ORAL at 21:20

## 2025-04-10 RX ADMIN — Medication 1 DROP(S): at 06:06

## 2025-04-10 RX ADMIN — OXYCODONE HYDROCHLORIDE 5 MILLIGRAM(S): 30 TABLET ORAL at 14:11

## 2025-04-10 RX ADMIN — OXYCODONE HYDROCHLORIDE 5 MILLIGRAM(S): 30 TABLET ORAL at 15:02

## 2025-04-10 RX ADMIN — Medication 1 DROP(S): at 15:14

## 2025-04-10 RX ADMIN — CELECOXIB 100 MILLIGRAM(S): 50 CAPSULE ORAL at 06:06

## 2025-04-10 RX ADMIN — Medication 1 TABLET(S): at 14:10

## 2025-04-10 RX ADMIN — Medication 975 MILLIGRAM(S): at 06:07

## 2025-04-10 RX ADMIN — Medication 975 MILLIGRAM(S): at 22:35

## 2025-04-10 RX ADMIN — Medication 975 MILLIGRAM(S): at 21:20

## 2025-04-10 RX ADMIN — MIDODRINE HYDROCHLORIDE 5 MILLIGRAM(S): 5 TABLET ORAL at 14:10

## 2025-04-10 RX ADMIN — Medication 975 MILLIGRAM(S): at 14:10

## 2025-04-10 RX ADMIN — Medication 1 DROP(S): at 17:59

## 2025-04-10 RX ADMIN — Medication 40 MILLIGRAM(S): at 06:06

## 2025-04-10 RX ADMIN — Medication 1 DROP(S): at 23:21

## 2025-04-10 RX ADMIN — CELECOXIB 100 MILLIGRAM(S): 50 CAPSULE ORAL at 17:59

## 2025-04-10 RX ADMIN — MIDODRINE HYDROCHLORIDE 5 MILLIGRAM(S): 5 TABLET ORAL at 07:44

## 2025-04-10 RX ADMIN — Medication 975 MILLIGRAM(S): at 15:01

## 2025-04-10 RX ADMIN — Medication 975 MILLIGRAM(S): at 06:29

## 2025-04-10 RX ADMIN — OXYCODONE HYDROCHLORIDE 5 MILLIGRAM(S): 30 TABLET ORAL at 08:02

## 2025-04-10 NOTE — PROGRESS NOTE ADULT - ATTENDING COMMENTS
Patient seen and examined with the resident. We discussed the case. I have directed the care. I edited the note. The patient requires acute rehab with 3 hours of daily therapies at least 5 out of 7 days and close physiatry follow up.  #Rehab of gait abnormality and decline in function 2/2 L hip IT fx s/p ORIF 3/26/2025  -CT LLE: Acute, impacted left femoral intertrochanteric fracture.  -c/w Tylenol 975 mg q8h ATC, celebrex 100 mg q8AM, and oxycodone 5 mg  q4h prn for pain, oxycodone 5 mg po q8am   -WBAT on the LLE  -PT/OT Eval and treat: Patient requires 3 hrs daily of interdisciplinary inpatient rehab at least 5 days a week.     #Post op anemia, left thigh ecchymosis  -Hgb 11.9 on admission   -Hgb 8.7 (4/7)  -Monitor CBC, transfuse <7    #Orthostatic hypotension (improving)  Close monitoring of her vitals  500 cc NS fluid bolus 3/29  1L NS 3/30  500cc NS fluid bolus 3/31  - encouraged oral fluid intake  - S/p  cc bolus (4/2)  - increase midodrine 2.5 mg bid to 5 mg bid(7:30 am and 12:30 pm) (4/2)  - Symptomatic 4/6 dizziness, headache will give another 500cc bolus with improvement in symptoms    #Headache (4/5) resolved  #Bilateral eye discomfort (4/5) resolved  - Patient complaining of headache, noted to be hypotensive, c/w midodrine as above  - Pain management  - Artificial tears for dry eye    #Hyperkalemia  - K 5.1 on 4/7   - will monitor     #MSK Sternal and rib pain, TTP  Lidocaine patch qd  Monitor    #Dementia, Mild  She benefit from and tolerate 3 hrs of daily intensive therapies.   -C/w home med donepezil 10 mg qhs    #Anxiety/depression  -c/w home med escitalopram 5 mg daily    #Contact dermatitis (4/2)  involving buttocks  - apply hydrocortisone ointment bid     -Pain control: Tylenol 975 mg q8h, Celebrex 100 mg twice a day;  oxycodone 5 mg po q4h prn, oxycodone 5 mg po daily at 8 a.m and 12:30 pm    -GI/Bowel Mgmt: senna/Miralax

## 2025-04-10 NOTE — DISCHARGE NOTE PROVIDER - CARE PROVIDER_API CALL
Jerrod Frazier  Orthopaedic Surgery  [address] [facility] [facility] [place] [address]  Phone: [phone]  Fax: [phone]  Follow Up Time: 1 week

## 2025-04-10 NOTE — DISCHARGE NOTE PROVIDER - NSDCCPCAREPLAN_GEN_ALL_CORE_FT
PRINCIPAL DISCHARGE DIAGNOSIS  Diagnosis: Intertrochanteric fracture of left hip  Assessment and Plan of Treatment: You came in after having a fall and you were found to have a left hip fracture. You underwent surgery (ORIF) with the orthopedic team on 3/26/25. Post op you had a drop in your hemoglobin and had left thigh ecchymosis. Your hemoglobin remained stable throughout your hospital course and will require outpatient monitoring and followup with your PCP. You were deemed a good candidate to receive intense inpatient therapy after evaluation by the physiatry team and so you were discharged to  rehab on 3/28/25. You received intense 3 hr therapies at least 5 days a week and have improved.  During your stay, you developed episodes of orthostatic hypotension, and was symptomatic from it but improved with multiple fluid boluses, and starting midodrine. You also had eye discomfort which improved with eye drops. You had an episode of chest pain, of musculoskeletal origin and improved with lidocaine patches. You also deveoped contact dermatitis on the buttocks which improved with a hydrocortisone cream. You had a one time slight elevation of your potassium. Please followup with your PCP for the above as well as with your ortho surgeon, Dr. Frazier upon discharge.       PRINCIPAL DISCHARGE DIAGNOSIS  Diagnosis: Intertrochanteric fracture of left hip  Assessment and Plan of Treatment: You came in after having a fall and you were found to have a left hip fracture. You underwent surgery (ORIF) with the orthopedic team on 3/26/25. Post op you had a drop in your hemoglobin and had left thigh ecchymosis. Your hemoglobin remained stable throughout your hospital course and will require outpatient monitoring and followup with your PCP. You were deemed a good candidate to receive intense inpatient therapy after evaluation by the physiatry team and so you were discharged to  rehab on 3/28/25. You received intense 3 hr therapies at least 5 days a week and have improved.  During your stay, you developed episodes of orthostatic hypotension, and was symptomatic from it but improved with multiple fluid boluses, and starting midodrine. You also had eye discomfort which improved with eye drops. You had an episode of chest pain, of musculoskeletal origin and improved with lidocaine patches. You also deveoped contact dermatitis on the buttocks which improved with a hydrocortisone cream. You had a one time slight elevation of your potassium. Please followup with your PCP( we have made  an appointment for you with dr Barakat at [facility] at [address] [place] [address] on 5/16 at 3 pm , please make sure to keep the appointment and follow up )  for the above as well as with your ortho surgeon, Dr. Frazier upon dischargein 1 to 2 weeks , Your staples will be removed on day of Discharge .    **Please continue all your other medication  for anxiety an ddepression asociated with dementia , also was on medication to keep a normal blood pressure  with midodrine , continue to take  good oral intake of fluid , change position slowly from lying to sit up an dstand up to avoid dropping blood pressure suddenly  and to prevent falls. .         SECONDARY DISCHARGE DIAGNOSES  Diagnosis: Musculoskeletal chest pain  Assessment and Plan of Treatment: While in rehab you had this musculoskeletal pain which is improving with tylenol oxycodone  and lidocaine patches . Please continue to use  as needed .     PRINCIPAL DISCHARGE DIAGNOSIS  Diagnosis: Intertrochanteric fracture of left hip  Assessment and Plan of Treatment: You came in after having a fall and you were found to have a left hip fracture. You underwent surgery (ORIF) with the orthopedic team on 3/26/25. Post op you had a drop in your hemoglobin and had left thigh ecchymosis. Your hemoglobin remained stable throughout your hospital course and will require outpatient monitoring and followup with your PCP. You were deemed a good candidate to receive intense inpatient therapy after evaluation by the physiatry team and so you were discharged to  rehab on 3/28/25. You received intense 3 hr therapies at least 5 days a week and have improved.  During your stay, you developed episodes of orthostatic hypotension, and was symptomatic from it but improved with multiple fluid boluses, and starting midodrine. You also had eye discomfort which improved with eye drops. You had an episode of chest pain, of musculoskeletal origin and improved with lidocaine patches. You also deveoped contact dermatitis on the buttocks which improved with a hydrocortisone cream. You had a one time slight elevation of your potassium. Please followup with your PCP( we have made  an appointment for you with dr Barakat at [facility] at [address] [place] [address] on 5/16 at 3 pm , please make sure to keep the appointment and follow up )  for the above as well as with your ortho surgeon, Dr. Frazier upon dischargein 1 to 2 weeks , Your staples will be removed on day of Discharge .    **Please continue all your other medication  for anxiety an ddepression asociated with dementia , also was on medication to keep a normal blood pressure  with midodrine , continue to take  good oral intake of fluid , change position slowly from lying to sit up an dstand up to avoid dropping blood pressure suddenly  and to prevent falls. .         SECONDARY DISCHARGE DIAGNOSES  Diagnosis: Musculoskeletal chest pain  Assessment and Plan of Treatment: While in rehab you had this musculoskeletal pain which is improving with tylenol oxycodone  and lidocaine patches . Please continue to use  as needed .    Diagnosis: Neck pain  Assessment and Plan of Treatment:     Diagnosis: Orthostatic hypotension  Assessment and Plan of Treatment:      PRINCIPAL DISCHARGE DIAGNOSIS  Diagnosis: Intertrochanteric fracture of left hip  Assessment and Plan of Treatment: You came in after having a fall and you were found to have a left hip fracture. You underwent surgery (ORIF) with the orthopedic team on 3/26/25. Post op you had a drop in your hemoglobin and had left thigh ecchymosis. Your hemoglobin remained stable throughout your hospital course and will require outpatient monitoring and followup with your PCP. You were deemed a good candidate to receive intense inpatient therapy after evaluation by the physiatry team and so you were discharged to  rehab on 3/28/25. You received intense 3 hr therapies at least 5 days a week and have improved.  During your stay, you developed episodes of orthostatic hypotension, and was symptomatic from it but improved with multiple fluid boluses, and starting midodrine. You also had eye discomfort which improved with eye drops. You had an episode of chest pain, of musculoskeletal origin and improved with lidocaine patches. You also deveoped contact dermatitis on the buttocks which improved with a hydrocortisone cream. You had a one time slight elevation of your potassium. Please followup with your PCP( we have made  an appointment for you with dr Barkaat at [facility] at [address] [place] [address] on 5/16 at 3 pm , please make sure to keep the appointment and follow up )  for the above as well as with your ortho surgeon, Dr. Frazier upon dischargein 1 to 2 weeks , Your staples will be removed on day of Discharge .    **Please continue all your other medication  for anxiety an ddepression asociated with dementia , also was on medication to keep a normal blood pressure  with midodrine , continue to take  good oral intake of fluid , change position slowly from lying to sit up an dstand up to avoid dropping blood pressure suddenly  and to prevent falls. .         SECONDARY DISCHARGE DIAGNOSES  Diagnosis: Musculoskeletal chest pain  Assessment and Plan of Treatment: While in rehab you had this musculoskeletal pain which is improving with tylenol oxycodone  and lidocaine patches . Please continue to use  as needed .    Diagnosis: Neck pain  Assessment and Plan of Treatment: We  started you on Relafen  to take  2 times  day  and robaxin 1000 mg at bed time to get relief from arthritic pains  with percocet or oxycodone withtylenol as needed  for severe pain .    Diagnosis: Orthostatic hypotension  Assessment and Plan of Treatment: Please continue taking midodrine  2 times  day first dose  30 minutes  before getting out of  bed , wear ASHER  stockings  and abdominal binders before getting out of bed to prevent sudden drop in blood pressure , rise slowly fromthe posiiton , first sit at the edge of bed , for few minutes then rise up to stand out  and walk .

## 2025-04-10 NOTE — DISCHARGE NOTE PROVIDER - NSDCMRMEDTOKEN_GEN_ALL_CORE_FT
acetaminophen 325 mg oral tablet: 2 tab(s) orally every 6 hours As needed Mild Pain (1 - 3)  donepezil 10 mg oral tablet: 1 tab(s) orally once a day  enoxaparin: 40 milligram(s) subcutaneous once a day dvt prophylaxis for 5 more weeks post op  escitalopram 5 mg oral tablet: 1 tab(s) orally once a day  multivitamin: 1 cap(s) orally once a day  oxycodone-acetaminophen 5 mg-325 mg oral tablet: 1 tab(s) orally every 6 hours As needed Moderate Pain (4 - 6)  polyethylene glycol 3350 oral powder for reconstitution: 17 gram(s) orally 2 times a day  senna leaf extract oral tablet: 2 tab(s) orally once a day (at bedtime)   acetaminophen 325 mg oral tablet: 2 tab(s) orally every 6 hours As needed Mild Pain (1 - 3)  albuterol 90 mcg/inh inhalation aerosol: 2 puff(s) inhaled every 6 hours As needed Shortness of Breath and/or Wheezing  aspirin 81 mg oral delayed release tablet: 1 tab(s) orally 2 times a day DVT prevention ( blood clot prevention )  celecoxib 100 mg oral capsule: 1 cap(s) orally every 12 hours  cholecalciferol oral tablet: 2000 unit(s) orally once a day  donepezil 10 mg oral tablet: 1 tab(s) orally once a day  enoxaparin: 40 milligram(s) subcutaneous once a day dvt prophylaxis for 5 more weeks post op  escitalopram 5 mg oral tablet: 1 tab(s) orally once a day  hydrocortisone 1% topical cream: 1 Apply topically to affected area 2 times a day As needed Rash and/or Itching  lidocaine 4% topical film: Apply topically to affected area once a day as needed for MSK pain apply to chest with muscular pain as needed remove after 12 hours of application  melatonin 5 mg oral tablet: 1 tab(s) orally once a day (at bedtime) As needed Insomnia  midodrine 5 mg oral tablet: 1 tab(s) orally every 12 hours  montelukast 10 mg oral tablet: 1 tab(s) orally once a day  multivitamin: 1 cap(s) orally once a day  ocular lubricant preservative-free ophthalmic solution: 1 drop(s) to each affected eye every 6 hours  oxycodone-acetaminophen 5 mg-325 mg oral tablet: 1 tab(s) orally every 6 hours As needed Moderate Pain (4 - 6)  pantoprazole 40 mg oral delayed release tablet: 1 tab(s) orally once a day (before a meal)  polyethylene glycol 3350 oral powder for reconstitution: 17 gram(s) orally 2 times a day  senna leaf extract oral tablet: 2 tab(s) orally once a day (at bedtime)   acetaminophen 325 mg oral tablet: 2 tab(s) orally every 6 hours As needed Mild Pain (1 - 3)  aspirin 81 mg oral delayed release tablet: 1 tab(s) orally 2 times a day DVT prevention ( blood clot prevention )  celecoxib 100 mg oral capsule: 1 cap(s) orally every 12 hours  cholecalciferol oral tablet: 2000 unit(s) orally once a day  donepezil 10 mg oral tablet: 1 tab(s) orally once a day  escitalopram 5 mg oral tablet: 1 tab(s) orally once a day  lidocaine 4% topical film: Apply topically to affected area once a day as needed for MSK pain apply to chest with muscular pain as needed remove after 12 hours of application  montelukast 10 mg oral tablet: 1 tab(s) orally once a day  multivitamin: 1 cap(s) orally once a day  oxycodone-acetaminophen 5 mg-325 mg oral tablet: 1 tab(s) orally every 8 hours as needed for Moderate Pain (4 - 6) MDD: 3  pantoprazole 40 mg oral delayed release tablet: 1 tab(s) orally once a day (before a meal) to take while on aspirin and Celebrex  senna leaf extract oral tablet: 2 tab(s) orally once a day (at bedtime)   acetaminophen 325 mg oral tablet: 2 tab(s) orally every 6 hours As needed Mild Pain (1 - 3)  aspirin 81 mg oral delayed release tablet: 1 tab(s) orally 2 times a day DVT prevention ( blood clot prevention )  cholecalciferol oral tablet: 2000 unit(s) orally once a day  donepezil 10 mg oral tablet: 1 tab(s) orally once a day  escitalopram 5 mg oral tablet: 1 tab(s) orally once a day  lidocaine 4% topical film: Apply topically to affected area once a day as needed for MSK pain apply to chest with muscular pain as needed remove after 12 hours of application  montelukast 10 mg oral tablet: 1 tab(s) orally once a day  multivitamin: 1 cap(s) orally once a day  oxycodone-acetaminophen 5 mg-325 mg oral tablet: 1 tab(s) orally every 8 hours as needed for Moderate Pain (4 - 6) MDD: 3  pantoprazole 40 mg oral delayed release tablet: 1 tab(s) orally once a day (before a meal) to take while on aspirin and Celebrex  senna leaf extract oral tablet: 2 tab(s) orally once a day (at bedtime)   aspirin 81 mg oral delayed release tablet: 1 tab(s) orally 2 times a day DVT prevention ( blood clot prevention )  cholecalciferol 50 mcg (2000 intl units) oral tablet: orally once a day  donepezil 10 mg oral tablet: 1 tab(s) orally once a day  escitalopram 5 mg oral tablet: 1 tab(s) orally once a day  methocarbamol 1000 mg oral tablet: 1 tab(s) orally once a day (at bedtime)  midodrine 5 mg oral tablet: 1 tab(s) orally 2 times a day Please take after waking up before getting out of bed then agan 1 pm  montelukast 10 mg oral tablet: 1 tab(s) orally once a day  Multiple Vitamins oral tablet: 1 tab(s) orally once a day  nabumetone 500 mg oral tablet: 1 tab(s) orally every 12 hours  naloxone 4 mg/0.1 mL nasal spray: 1 spray(s) intranasally once for narcotic over dose  oxycodone-acetaminophen 5 mg-325 mg oral tablet: 1 tab(s) orally every 8 hours as needed for Moderate Pain (4 - 6) MDD: 3  pantoprazole 40 mg oral delayed release tablet: 1 tab(s) orally once a day (before a meal) to take while on aspirin and Celebrex

## 2025-04-10 NOTE — PROGRESS NOTE ADULT - SUBJECTIVE AND OBJECTIVE BOX
Patient is a 70 y.o. old female who presents with a chief complaint of Rehab of gait abnormality and decline in function 2/2 L hip IT fx s/p ORIF 3/26/2025 (28 Mar 2025 14:30)      HPI:  71 yo F with PMH of mild dementia, anxiety, bilateral cataracts presented to the ED 3/24/2025 s/p fall. Son stated that the pt was walking down stairs and slipped on her socks while descending the final 3 steps and fell on her left side. No head trauma or LOC. In ED CT LLE showed acute, impacted left femoral intertrochanteric fracture. Patient was evaluated by orthopedics and is s/p L hip ORIF 3/24/2025. Postop course complicated by postop anemia and orthostatic hypotension s/p IVF, will continue to monitor in acute rehab.     Imaging:   XR Left Femur: Comminuted trochanteric fracture.  CTH negative  CT Cspine negative  CTAP: Acute, impacted left femoral intertrochanteric fracture. Sacral Tarlov cysts.  CT LLE: Acute, impacted left femoral intertrochanteric fracture. Soft tissues unremarkable. No hip dislocation. Degenerative change of left hip and left knee.      Prior to admission, the patient was independent in ADLs and ambulation without an assistive device. Patient now requires assistance with ambulation and ADLs 2/2 to L Hip ORIF. There is a significant functional decline from baseline. To return home it is in the patient’s best interest to have acute inpatient rehabilitative services to undergo intensive interdisciplinary therapy. Furthermore, the patient is motivated and is able to tolerate up to 3 hours of daily therapy for 5-6 days a week for a total of 15 hours a week. Evaluated by Physiatry and deemed to be an excellent candidate for admission to  for acute inpatient rehab. Admitted to Acute Rehab on 3/27/2025.    Pt seen and examined by Physiatry and is currently functioning at maxA bed mobility, modA transfers, 20’ RW modA, UBD standy-by, LBD modA.    LS: lives with family in  with 5STE and 1FOS Inside  PLOF: independent in ADLs and ambulate with no assistive device     (28 Mar 2025 14:30)    TODAY'S SUBJECTIVE & REVIEW OF SYMPTOMS  Sami  ID [id number]  Patient seen and examined this morning at  bedside  No overnight events.   No new complaints;  Tolerating PT/OT and oral intake well.   Voiding bowel/bladder spontaneously and regularly.  Vital signs reviewed.       CLOF: Partial A for bed mob; TA/RW for transfers; amb 20 ft x 3 using RW/TA    PHYSICAL EXAM  Vital Signs (24 Hrs):  T(C): 36.7 (04-10-25 @ 05:37), Max: 36.8 (04-09-25 @ 12:20)  HR: 60 (04-10-25 @ 05:37) (54 - 66)  BP: 103/62 (04-10-25 @ 05:37) (101/61 - 115/66)  RR: 18 (04-10-25 @ 05:37) (18 - 18)  SpO2: 100% (04-10-25 @ 05:37) (99% - 100%)  Wt(kg): --  Daily     Daily     I&O's Summary        General:[x   ] NAD, Resting Comfortable,   [   ] other:                                HEENT: [x   ] NC/AT, EOMI, PERRL , Normal Conjunctivae,   [   ] other:  Cardio: [  x ] RRR, no murmur,   [   ] other:                              Pulm: [ x  ] No Respiratory Distress,  Lungs CTAB,   [   ] other:                       Abdomen: [x   ]ND/NT, Soft,   [   ] other:    : [ x  ] NO ELIZALDE CATHETER, [   ] ELIZALDE CATHETER- no meatal tear, no discharge, [   ] other:                                            MSK: [   ] No joint swelling, Full ROM,   [  x ] other: L thigh/hip swelling, L hip TTP, L hip ecchymosis                                         Ext: [ x  ]No C/C/E, No calf tenderness,   [   ]other:    Skin: [   ]intact,   [  x ] other: surgical dressing c/d/i                                                                   Neurological Examination:  Cognitive: [    ] AAO x 3,   [x    ]  other: not oriented to year, she knows the year. She is  pleasant and able to give her history. She follows simple commands  well.                                                                Attention:  [ x   ] intact,   [    ]  other:                            Mood/Affect: [   x ] wnl,    [    ]  other:                                                                             CN II - XII:  [  x  ] intact,  [    ] other:                                                                                        Motor:   RIGHT UE: [ x  ] WNL,  [   ] other:  LEFT    UE: [ x  ] WNL,  [   ] other:  RIGHT LE: [ x  ] WNL,  [   ] other:   LEFT    LE: [   ] WNL,  [  x ] other: HF 1+/5, KE/KF 3-/5, PF/DF 5/5 limited 2/2 pain    DTRs: [ x  ]symmetric, [   ] other:                                                                    Sensory: [  x  ] Intact to light touch,   [    ] other:    MEDICATIONS  (STANDING):  acetaminophen     Tablet .. 975 milliGRAM(s) Oral every 8 hours  artificial tears (preservative free) Ophthalmic Solution 1 Drop(s) Both EYES every 6 hours  celecoxib 100 milliGRAM(s) Oral every 12 hours  chlorhexidine 2% Cloths 1 Application(s) Topical daily  cholecalciferol 2000 Unit(s) Oral daily  donepezil 10 milliGRAM(s) Oral at bedtime  enoxaparin Injectable 40 milliGRAM(s) SubCutaneous every 24 hours  escitalopram 5 milliGRAM(s) Oral daily  lidocaine   4% Patch 1 Patch Transdermal daily  midodrine. 5 milliGRAM(s) Oral <User Schedule>  montelukast 10 milliGRAM(s) Oral daily  multivitamin 1 Tablet(s) Oral daily  oxyCODONE    IR 5 milliGRAM(s) Oral <User Schedule>  pantoprazole    Tablet 40 milliGRAM(s) Oral before breakfast  polyethylene glycol 3350 17 Gram(s) Oral at bedtime    MEDICATIONS  (PRN):  albuterol    90 MICROgram(s) HFA Inhaler 2 Puff(s) Inhalation every 6 hours PRN Shortness of Breath and/or Wheezing  hydrocortisone 1% Cream 1 Application(s) Topical two times a day PRN Rash and/or Itching  melatonin 5 milliGRAM(s) Oral at bedtime PRN Insomnia  oxyCODONE    IR 5 milliGRAM(s) Oral every 4 hours PRN Moderate Pain (4 - 6)      RECENT LABS/IMAGING                        8.7    6.08  )-----------( 409      ( 07 Apr 2025 05:15 )             28.0     04-07    137  |  103  |  17  ----------------------------<  89  5.1[H]   |  26  |  0.6[L]    Ca    9.6      07 Apr 2025 05:15  Mg     2.2     04-07    TPro  7.0  /  Alb  3.8  /  TBili  0.3  /  DBili  x   /  AST  47[H]  /  ALT  58[H]  /  AlkPhos  174[H]  04-07      Urinalysis Basic - ( 07 Apr 2025 05:15 )    Color: x / Appearance: x / SG: x / pH: x  Gluc: 89 mg/dL / Ketone: x  / Bili: x / Urobili: x   Blood: x / Protein: x / Nitrite: x   Leuk Esterase: x / RBC: x / WBC x   Sq Epi: x / Non Sq Epi: x / Bacteria: x

## 2025-04-10 NOTE — DISCHARGE NOTE PROVIDER - HOSPITAL COURSE
69 yo F with PMH of mild dementia, anxiety, bilateral cataracts presented to the ED 3/24/25 s/p fall. Son stated that the pt was walking down stairs and slipped on her socks while descending the final 3 steps and fell on her left side. No head trauma or LOC. In ED CT LLE showed acute, impacted left femoral intertrochanteric fracture. Patient was evaluated by orthopedics and is s/p L hip ORIF 3/24. Post op course complicated by post op anemia and orthostatic hypotension s/p IVF, will continue to monitor in acute rehab.     Imaging:   XR Left Femur: Comminuted trochanteric fracture.  CTH negative  CT Cspine negative  CTAP: Acute, impacted left femoral intertrochanteric fracture. Sacral Tarlov cysts.  CT LLE: Acute, impacted left femoral intertrochanteric fracture.Soft tissues unremarkable. No hip dislocation. Degenerative change of left hip and left knee.      Prior to admission, the patient was independent in ADLs and ambulation without an assistive device. Patient now requires assistance with ambulation and ADLs 2/2 to L Hip ORIF. There is a significant functional decline from baseline. To return home it is in the patient’s best interest to have acute inpatient rehabilitative services to undergo intensive interdisciplinary therapy. Furthermore, the patient is motivated and is able to tolerate up to 3 hours of daily therapy for 5-6 days a week for a total of 15 hours a week. Evaluated by Physiatry and deemed to be an excellent candidate for admission to  for acute inpatient rehab. Admitted to Acute Rehab on 3/27/25.    Pt seen and examined by Physiatry and is currently functioning at [facility] bed mobility, modA transfers, 20’ RW modA, UBD standy-by, LBD modA.    LS: lives with family in  with 5STE and 1FOS Inside  PLOF: independent in ADLs and ambulate with no assistive device    Pt admitted to  acute rehab on 3/28/25 for rehab of gait abnormality and decline in function 2/2 L hip IT fx s/p ORIF 3/26.    Pt was managed for the following during her stay:  #Rehab of gait abnormality and decline in function 2/2 L hip IT fx s/p ORIF 3/26/2025  -CT LLE: Acute, impacted left femoral intertrochanteric fracture.  -c/w Tylenol 975 mg q8h ATC, celebrex 100 mg q8AM, and oxycodone 5 mg  q4h prn for pain, oxycodone 5 mg po q8am   -WBAT on the LLE  -PT/OT Eval and treat: Patient participated in 3 hrs daily of interdisciplinary inpatient rehab at least 5 days a week.   -pain control with Tylenol 975 mg q8h, Celebrex 100 mg twice a day;  oxycodone 5 mg po q4h prn, oxycodone 5 mg po daily at 8 a.m and 12:30 pm (for 7 days)      #Post op anemia, left thigh ecchymosis- stable  -Hgb 11.9 on admission, lowest was 8.0 and have since remained stable in 8-9 range.     #Orthostatic hypotension- improving  - had symptomatic 4/6 dizziness, headache   - multiple fluid boluses were given on 3/29, 3/30, 3/31, and 4/2 with improvement in symptoms  - 4/2- midodrine 2.5 mg bid increased to 5 mg bid    #Headache (4/5)- resolved  #Bilateral eye discomfort (4/5)- resolved  - Patient complaining of headache, noted to be hypotensive which improved with fluid boluses  - Pain management  - Artificial tears for dry eye  - c/w midodrine    #Hyperkalemia  - K 5.1 on 4/7   - outpatient followup    #MSK Sternal and rib pain, TTP- improved  - continue with Lidocaine patch qd    #Dementia, Mild  - C/w home med donepezil 10 mg qhs    #Anxiety/depression  - c/w home med escitalopram 5 mg daily    #Contact dermatitis (4/2)  - involving buttocks  - apply hydrocortisone ointment twice daily PRN for rash or itchiness       Pt participated in intense therapies for 3 hrs a day for at least 5 days a week and has improved. Pt is medically stable to be discharged to home with home care.    69 yo F with PMH of mild dementia, anxiety, bilateral cataracts presented to the ED 3/24/25 s/p fall. Son stated that the pt was walking down stairs and slipped on her socks while descending the final 3 steps and fell on her left side. No head trauma or LOC. In ED CT LLE showed acute, impacted left femoral intertrochanteric fracture. Patient was evaluated by orthopedics and is s/p L hip ORIF 3/24. Post op course complicated by post op anemia and orthostatic hypotension s/p IVF, will continue to monitor in acute rehab.     Imaging:   XR Left Femur: Comminuted trochanteric fracture.  CTH negative  CT Cspine negative  CTAP: Acute, impacted left femoral intertrochanteric fracture. Sacral Tarlov cysts.  CT LLE: Acute, impacted left femoral intertrochanteric fracture. Soft tissues unremarkable. No hip dislocation. Degenerative change of left hip and left knee.      Prior to admission, the patient was independent in ADLs and ambulation without an assistive device. Patient now requires assistance with ambulation and ADLs 2/2 to L Hip ORIF. There is a significant functional decline from baseline. To return home it is in the patient’s best interest to have acute inpatient rehabilitative services to undergo intensive interdisciplinary therapy. Furthermore, the patient is motivated and is able to tolerate up to 3 hours of daily therapy for 5-6 days a week for a total of 15 hours a week. Evaluated by Physiatry and deemed to be an excellent candidate for admission to  for acute inpatient rehab. Admitted to Acute Rehab on 3/27/25.    Pt seen and examined by Physiatry and is currently functioning at [facility] bed mobility, modA transfers, 20’ RW modA, UBD standy-by, LBD modA.    LS: lives with family in  with 5STE and 1FOS Inside  PLOF: independent in ADLs and ambulate with no assistive device    Pt admitted to  acute rehab on 3/28/25 for rehab of gait abnormality and decline in function 2/2 L hip IT fx s/p ORIF 3/26.    Pt was managed for the following during her stay:  #Rehab of gait abnormality and decline in function 2/2 L hip IT fx s/p ORIF 3/26/2025  -CT LLE: Acute, impacted left femoral intertrochanteric fracture.  -c/w Tylenol 975 mg q8h ATC, celebrex 100 mg q8AM, and oxycodone 5 mg  q4h prn for pain, oxycodone 5 mg po q8am   -WBAT on the LLE  -PT/OT Eval and treat: Patient participated in 3 hrs daily of interdisciplinary inpatient rehab at least 5 days a week.   -pain control with Tylenol 975 mg q8h, Celebrex 100 mg twice a day;  oxycodone 5 mg po q4h prn, oxycodone 5 mg po daily at 8 a.m and 12:30 pm (for 7 days)      #Post op anemia, left thigh ecchymosis- stable  -Hgb 11.9 on admission, lowest was 8.0 and have since remained stable in 8-9 range.     #Orthostatic hypotension- improving  - had symptomatic 4/6 dizziness, headache   - multiple fluid boluses were given on 3/29, 3/30, 3/31, and 4/2 with improvement in symptoms  - 4/2- midodrine 2.5 mg bid increased to 5 mg bid    #Headache (4/5)- resolved  #Bilateral eye discomfort (4/5)- resolved  - Patient complaining of headache, noted to be hypotensive which improved with fluid boluses  - Pain management  - Artificial tears for dry eye  - c/w midodrine    #Hyperkalemia  - K 5.1 on 4/7   - outpatient followup    #MSK Sternal and rib pain, TTP- improved  - continue with Lidocaine patch qd    #Dementia, Mild  - C/w home med donepezil 10 mg qhs    #Anxiety/depression  - c/w home med escitalopram 5 mg daily    #Contact dermatitis (4/2)  - involving buttocks  - apply hydrocortisone ointment twice daily PRN for rash or itchiness       Pt participated in intense therapies for 3 hrs a day for at least 5 days a week and has improved. Pt is medically stable to be discharged to home with home care.

## 2025-04-10 NOTE — PROGRESS NOTE ADULT - ASSESSMENT
ASSESSMENT/PLAN    70 y.o. F with pmhx of mild dementia, anxiety bilateral cataracts presenting for mechanism fall with Left intertrochanteric hip s/p ORIF 3/24/2025. Postop coarse complicated by orthostatic hypotension postop anemia. Her hip fracture is quite painful.      #Rehab of gait abnormality and decline in function 2/2 L hip IT fx s/p ORIF 3/26/2025  -CT LLE: Acute, impacted left femoral intertrochanteric fracture.  -c/w Tylenol 975 mg q8h ATC, celebrex 100 mg q8AM, and oxycodone 5 mg  q4h prn for pain, oxycodone 5 mg po q8am   -WBAT on the LLE  -PT/OT Eval and treat: Patient requires 3 hrs daily of interdisciplinary inpatient rehab at least 5 days a week.     #Post op anemia, left thigh ecchymosis  -Hgb 11.9 on admission   -Hgb 8.7 (4/7)  -Monitor CBC, transfuse <7    #Orthostatic hypotension (improving)  Close monitoring of her vitals  500 cc NS fluid bolus 3/29  1L NS 3/30  500cc NS fluid bolus 3/31  - encouraged oral fluid intake  - S/p  cc bolus (4/2)  - increase midodrine 2.5 mg bid to 5 mg bid(7:30 am and 12:30 pm) (4/2)  - Symptomatic 4/6 dizziness, headache will give another 500cc bolus with improvement in symptoms    #Headache (4/5) resolved  #Bilateral eye discomfort (4/5) resolved  - Patient complaining of headache, noted to be hypotensive, c/w midodrine as above  - Pain management  - Artificial tears for dry eye    #Hyperkalemia  - K 5.1 on 4/7   - will monitor     #MSK Sternal and rib pain, TTP  Lidocaine patch qd  Monitor    #Dementia, Mild  She benefit from and tolerate 3 hrs of daily intensive therapies.   -C/w home med donepezil 10 mg qhs    #Anxiety/depression  -c/w home med escitalopram 5 mg daily    #Contact dermatitis (4/2)  involving buttocks  - apply hydrocortisone ointment bid     -Pain control: Tylenol 975 mg q8h, Celebrex 100 mg twice a day;  oxycodone 5 mg po q4h prn, oxycodone 5 mg po daily at 8 a.m and 12:30 pm    -GI/Bowel Mgmt: senna/Miralax     - Diet: Regular, Halal      Precautions / PROPHYLAXIS:      - Falls    - Ortho: Weight bearing status: WBAT LLE      - DVT prophylaxis: Lovenox

## 2025-04-11 RX ORDER — HYDROCORTISONE 10 MG/G
1 CREAM TOPICAL
Qty: 0 | Refills: 0 | DISCHARGE
Start: 2025-04-11

## 2025-04-11 RX ORDER — LIDOCAINE HYDROCHLORIDE 20 MG/ML
1 JELLY TOPICAL
Qty: 0 | Refills: 0 | DISCHARGE
Start: 2025-04-11

## 2025-04-11 RX ORDER — MONTELUKAST SODIUM 10 MG/1
1 TABLET ORAL
Qty: 0 | Refills: 0 | DISCHARGE
Start: 2025-04-11

## 2025-04-11 RX ORDER — OXYCODONE HYDROCHLORIDE AND ACETAMINOPHEN 10; 325 MG/1; MG/1
1 TABLET ORAL
Qty: 21 | Refills: 0
Start: 2025-04-11 | End: 2025-04-17

## 2025-04-11 RX ORDER — ALBUTEROL SULFATE 2.5 MG/3ML
2 VIAL, NEBULIZER (ML) INHALATION
Qty: 0 | Refills: 0 | DISCHARGE
Start: 2025-04-11

## 2025-04-11 RX ORDER — HYPROMELLOSE 0.4 %
1 DROPS OPHTHALMIC (EYE)
Qty: 0 | Refills: 0 | DISCHARGE
Start: 2025-04-11

## 2025-04-11 RX ORDER — MELATONIN 5 MG
1 TABLET ORAL
Qty: 0 | Refills: 0 | DISCHARGE
Start: 2025-04-11

## 2025-04-11 RX ORDER — CELECOXIB 50 MG/1
1 CAPSULE ORAL
Qty: 0 | Refills: 0 | DISCHARGE
Start: 2025-04-11

## 2025-04-11 RX ORDER — MIDODRINE HYDROCHLORIDE 5 MG/1
1 TABLET ORAL
Qty: 0 | Refills: 0 | DISCHARGE
Start: 2025-04-11

## 2025-04-11 RX ORDER — ASPIRIN 325 MG
1 TABLET ORAL
Qty: 60 | Refills: 0
Start: 2025-04-11 | End: 2025-05-10

## 2025-04-11 RX ADMIN — Medication 1 DROP(S): at 05:50

## 2025-04-11 RX ADMIN — LIDOCAINE HYDROCHLORIDE 1 PATCH: 20 JELLY TOPICAL at 19:00

## 2025-04-11 RX ADMIN — Medication 975 MILLIGRAM(S): at 23:04

## 2025-04-11 RX ADMIN — OXYCODONE HYDROCHLORIDE 5 MILLIGRAM(S): 30 TABLET ORAL at 08:52

## 2025-04-11 RX ADMIN — Medication 975 MILLIGRAM(S): at 12:48

## 2025-04-11 RX ADMIN — CELECOXIB 100 MILLIGRAM(S): 50 CAPSULE ORAL at 05:51

## 2025-04-11 RX ADMIN — Medication 2000 UNIT(S): at 12:47

## 2025-04-11 RX ADMIN — OXYCODONE HYDROCHLORIDE 5 MILLIGRAM(S): 30 TABLET ORAL at 15:38

## 2025-04-11 RX ADMIN — CELECOXIB 100 MILLIGRAM(S): 50 CAPSULE ORAL at 16:18

## 2025-04-11 RX ADMIN — Medication 1 TABLET(S): at 12:47

## 2025-04-11 RX ADMIN — OXYCODONE HYDROCHLORIDE 5 MILLIGRAM(S): 30 TABLET ORAL at 12:46

## 2025-04-11 RX ADMIN — Medication 1 APPLICATION(S): at 13:08

## 2025-04-11 RX ADMIN — MIDODRINE HYDROCHLORIDE 5 MILLIGRAM(S): 5 TABLET ORAL at 12:48

## 2025-04-11 RX ADMIN — Medication 975 MILLIGRAM(S): at 06:35

## 2025-04-11 RX ADMIN — Medication 975 MILLIGRAM(S): at 15:38

## 2025-04-11 RX ADMIN — CELECOXIB 100 MILLIGRAM(S): 50 CAPSULE ORAL at 06:36

## 2025-04-11 RX ADMIN — DONEPEZIL HYDROCHLORIDE 10 MILLIGRAM(S): 5 TABLET ORAL at 23:05

## 2025-04-11 RX ADMIN — LIDOCAINE HYDROCHLORIDE 1 PATCH: 20 JELLY TOPICAL at 02:00

## 2025-04-11 RX ADMIN — ESCITALOPRAM OXALATE 5 MILLIGRAM(S): 20 TABLET ORAL at 12:48

## 2025-04-11 RX ADMIN — Medication 1 DROP(S): at 12:46

## 2025-04-11 RX ADMIN — Medication 975 MILLIGRAM(S): at 23:05

## 2025-04-11 RX ADMIN — Medication 1 DROP(S): at 16:15

## 2025-04-11 RX ADMIN — Medication 40 MILLIGRAM(S): at 05:51

## 2025-04-11 RX ADMIN — ENOXAPARIN SODIUM 40 MILLIGRAM(S): 100 INJECTION SUBCUTANEOUS at 23:04

## 2025-04-11 RX ADMIN — MIDODRINE HYDROCHLORIDE 5 MILLIGRAM(S): 5 TABLET ORAL at 08:52

## 2025-04-11 RX ADMIN — LIDOCAINE HYDROCHLORIDE 1 PATCH: 20 JELLY TOPICAL at 13:08

## 2025-04-11 RX ADMIN — Medication 975 MILLIGRAM(S): at 05:51

## 2025-04-11 RX ADMIN — MONTELUKAST SODIUM 10 MILLIGRAM(S): 10 TABLET ORAL at 12:48

## 2025-04-11 NOTE — PROGRESS NOTE ADULT - SUBJECTIVE AND OBJECTIVE BOX
Patient is a 70 y.o. old female who presents with a chief complaint of Rehab of gait abnormality and decline in function 2/2 L hip IT fx s/p ORIF 3/26/2025 (28 Mar 2025 14:30)      HPI:  69 yo F with PMH of mild dementia, anxiety, bilateral cataracts presented to the ED 3/24/2025 s/p fall. Son stated that the pt was walking down stairs and slipped on her socks while descending the final 3 steps and fell on her left side. No head trauma or LOC. In ED CT LLE showed acute, impacted left femoral intertrochanteric fracture. Patient was evaluated by orthopedics and is s/p L hip ORIF 3/24/2025. Postop course complicated by postop anemia and orthostatic hypotension s/p IVF, will continue to monitor in acute rehab.     Imaging:   XR Left Femur: Comminuted trochanteric fracture.  CTH negative  CT Cspine negative  CTAP: Acute, impacted left femoral intertrochanteric fracture. Sacral Tarlov cysts.  CT LLE: Acute, impacted left femoral intertrochanteric fracture. Soft tissues unremarkable. No hip dislocation. Degenerative change of left hip and left knee.      Prior to admission, the patient was independent in ADLs and ambulation without an assistive device. Patient now requires assistance with ambulation and ADLs 2/2 to L Hip ORIF. There is a significant functional decline from baseline. To return home it is in the patient’s best interest to have acute inpatient rehabilitative services to undergo intensive interdisciplinary therapy. Furthermore, the patient is motivated and is able to tolerate up to 3 hours of daily therapy for 5-6 days a week for a total of 15 hours a week. Evaluated by Physiatry and deemed to be an excellent candidate for admission to  for acute inpatient rehab. Admitted to Acute Rehab on 3/27/2025.    Pt seen and examined by Physiatry and is currently functioning at maxA bed mobility, modA transfers, 20’ RW modA, UBD standy-by, LBD modA.    LS: lives with family in  with 5STE and 1FOS Inside  PLOF: independent in ADLs and ambulate with no assistive device     (28 Mar 2025 14:30)    TODAY'S SUBJECTIVE & REVIEW OF SYMPTOMS  [place]  ID   Patient seen and examined this morning at bedside  No overnight events.   No new complaints;  Tolerating PT/OT and oral intake well.   Voiding bowel/bladder spontaneously and regularly.  Vital signs reviewed.       CLOF: Partial A for bed mob; TA/RW for transfers; amb 20 ft x 3 using RW/TA    PHYSICAL EXAM  Vital Signs (24 Hrs):  T(C): 36.5 (04-11-25 @ 05:10), Max: 36.7 (04-10-25 @ 21:22)  HR: 69 (04-11-25 @ 05:10) (63 - 69)  BP: 135/64 (04-11-25 @ 05:10) (102/56 - 135/64)  RR: 18 (04-11-25 @ 05:10) (18 - 19)  SpO2: 99% (04-11-25 @ 05:10) (96% - 99%)  Wt(kg): --  Daily     Daily     I&O's Summary    10 Apr 2025 07:01  -  11 Apr 2025 07:00  --------------------------------------------------------  IN: 240 mL / OUT: 0 mL / NET: 240 mL    General:[x   ] NAD, Resting Comfortable,   [   ] other:                                HEENT: [x   ] NC/AT, EOMI, PERRL , Normal Conjunctivae,   [   ] other:  Cardio: [  x ] RRR, no murmur,   [   ] other:                              Pulm: [ x  ] No Respiratory Distress,  Lungs CTAB,   [   ] other:                       Abdomen: [x   ]ND/NT, Soft,   [   ] other:    : [ x  ] NO ELIZALDE CATHETER, [   ] ELIZALDE CATHETER- no meatal tear, no discharge, [   ] other:                                            MSK: [   ] No joint swelling, Full ROM,   [  x ] other: L thigh/hip swelling, L hip TTP, L hip ecchymosis                                         Ext: [ x  ]No C/C/E, No calf tenderness,   [   ]other:    Skin: [   ]intact,   [  x ] other: surgical dressing c/d/i                                                                   Neurological Examination:  Cognitive: [    ] AAO x 3,   [x    ]  other: not oriented to year, she knows the year. She is  pleasant and able to give her history. She follows simple commands  well.                                                                Attention:  [ x   ] intact,   [    ]  other:                            Mood/Affect: [   x ] wnl,    [    ]  other:                                                                             CN II - XII:  [  x  ] intact,  [    ] other:                                                                                        Motor:   RIGHT UE: [ x  ] WNL,  [   ] other:  LEFT    UE: [ x  ] WNL,  [   ] other:  RIGHT LE: [ x  ] WNL,  [   ] other:   LEFT    LE: [   ] WNL,  [  x ] other: HF 1+/5, KE/KF 3-/5, PF/DF 5/5 limited 2/2 pain    DTRs: [ x  ]symmetric, [   ] other:                                                                    Sensory: [  x  ] Intact to light touch,   [    ] other:    MEDICATIONS  (STANDING):  acetaminophen     Tablet .. 975 milliGRAM(s) Oral every 8 hours  artificial tears (preservative free) Ophthalmic Solution 1 Drop(s) Both EYES every 6 hours  celecoxib 100 milliGRAM(s) Oral every 12 hours  chlorhexidine 2% Cloths 1 Application(s) Topical daily  cholecalciferol 2000 Unit(s) Oral daily  donepezil 10 milliGRAM(s) Oral at bedtime  enoxaparin Injectable 40 milliGRAM(s) SubCutaneous every 24 hours  escitalopram 5 milliGRAM(s) Oral daily  lidocaine   4% Patch 1 Patch Transdermal daily  midodrine. 5 milliGRAM(s) Oral <User Schedule>  montelukast 10 milliGRAM(s) Oral daily  multivitamin 1 Tablet(s) Oral daily  oxyCODONE    IR 5 milliGRAM(s) Oral <User Schedule>  pantoprazole    Tablet 40 milliGRAM(s) Oral before breakfast  polyethylene glycol 3350 17 Gram(s) Oral at bedtime    MEDICATIONS  (PRN):  albuterol    90 MICROgram(s) HFA Inhaler 2 Puff(s) Inhalation every 6 hours PRN Shortness of Breath and/or Wheezing  hydrocortisone 1% Cream 1 Application(s) Topical two times a day PRN Rash and/or Itching  melatonin 5 milliGRAM(s) Oral at bedtime PRN Insomnia  oxyCODONE    IR 5 milliGRAM(s) Oral every 4 hours PRN Moderate Pain (4 - 6)      RECENT LABS/IMAGING                        8.7    6.08  )-----------( 409      ( 07 Apr 2025 05:15 )             28.0     04-07    137  |  103  |  17  ----------------------------<  89  5.1[H]   |  26  |  0.6[L]    Ca    9.6      07 Apr 2025 05:15  Mg     2.2     04-07    TPro  7.0  /  Alb  3.8  /  TBili  0.3  /  DBili  x   /  AST  47[H]  /  ALT  58[H]  /  AlkPhos  174[H]  04-07      Urinalysis Basic - ( 07 Apr 2025 05:15 )    Color: x / Appearance: x / SG: x / pH: x  Gluc: 89 mg/dL / Ketone: x  / Bili: x / Urobili: x   Blood: x / Protein: x / Nitrite: x   Leuk Esterase: x / RBC: x / WBC x   Sq Epi: x / Non Sq Epi: x / Bacteria: x Patient is a 70 y.o. old female who presents with a chief complaint of Rehab of gait abnormality and decline in function 2/2 L hip IT fx s/p ORIF 3/26/2025 (28 Mar 2025 14:30)      HPI:  71 yo F with PMH of mild dementia, anxiety, bilateral cataracts presented to the ED 3/24/2025 s/p fall. Son stated that the pt was walking down stairs and slipped on her socks while descending the final 3 steps and fell on her left side. No head trauma or LOC. In ED CT LLE showed acute, impacted left femoral intertrochanteric fracture. Patient was evaluated by orthopedics and is s/p L hip ORIF 3/24/2025. Postop course complicated by postop anemia and orthostatic hypotension s/p IVF, will continue to monitor in acute rehab.     Imaging:   XR Left Femur: Comminuted trochanteric fracture.  CTH negative  CT Cspine negative  CTAP: Acute, impacted left femoral intertrochanteric fracture. Sacral Tarlov cysts.  CT LLE: Acute, impacted left femoral intertrochanteric fracture. Soft tissues unremarkable. No hip dislocation. Degenerative change of left hip and left knee.      Prior to admission, the patient was independent in ADLs and ambulation without an assistive device. Patient now requires assistance with ambulation and ADLs 2/2 to L Hip ORIF. There is a significant functional decline from baseline. To return home it is in the patient’s best interest to have acute inpatient rehabilitative services to undergo intensive interdisciplinary therapy. Furthermore, the patient is motivated and is able to tolerate up to 3 hours of daily therapy for 5-6 days a week for a total of 15 hours a week. Evaluated by Physiatry and deemed to be an excellent candidate for admission to  for acute inpatient rehab. Admitted to Acute Rehab on 3/27/2025.    Pt seen and examined by Physiatry and is currently functioning at maxA bed mobility, modA transfers, 20’ RW modA, UBD standy-by, LBD modA.    LS: lives with family in  with 5STE and 1FOS Inside  PLOF: independent in ADLs and ambulate with no assistive device     (28 Mar 2025 14:30)    TODAY'S SUBJECTIVE & REVIEW OF SYMPTOMS  [place]  ID [id number]  Patient seen and examined this morning at bedside  No overnight events.   No new complaints;  Tolerating PT/OT and oral intake well.   Voiding bowel/bladder spontaneously and regularly.  Vital signs reviewed.       CLOF: sup/RW with bed mob and transfers; amb 50 ft x 2 using RW/TA    PHYSICAL EXAM  Vital Signs (24 Hrs):  T(C): 36.5 (04-11-25 @ 05:10), Max: 36.7 (04-10-25 @ 21:22)  HR: 69 (04-11-25 @ 05:10) (63 - 69)  BP: 135/64 (04-11-25 @ 05:10) (102/56 - 135/64)  RR: 18 (04-11-25 @ 05:10) (18 - 19)  SpO2: 99% (04-11-25 @ 05:10) (96% - 99%)  Wt(kg): --  Daily     Daily     I&O's Summary    10 Apr 2025 07:01  -  11 Apr 2025 07:00  --------------------------------------------------------  IN: 240 mL / OUT: 0 mL / NET: 240 mL    General:[x   ] NAD, Resting Comfortable,   [   ] other:                                HEENT: [x   ] NC/AT, EOMI, PERRL , Normal Conjunctivae,   [   ] other:  Cardio: [  x ] RRR, no murmur,   [   ] other:                              Pulm: [ x  ] No Respiratory Distress,  Lungs CTAB,   [   ] other:                       Abdomen: [x   ]ND/NT, Soft,   [   ] other:    : [ x  ] NO ELIZALDE CATHETER, [   ] ELIZALDE CATHETER- no meatal tear, no discharge, [   ] other:                                            MSK: [   ] No joint swelling, Full ROM,   [  x ] other: L thigh/hip swelling, L hip TTP, L hip ecchymosis                                         Ext: [ x  ]No C/C/E, No calf tenderness,   [   ]other:    Skin: [   ]intact,   [  x ] other: surgical dressing c/d/i                                                                   Neurological Examination:  Cognitive: [    ] AAO x 3,   [x    ]  other: not oriented to year, she knows the year. She is  pleasant and able to give her history. She follows simple commands  well.                                                                Attention:  [ x   ] intact,   [    ]  other:                            Mood/Affect: [   x ] wnl,    [    ]  other:                                                                             CN II - XII:  [  x  ] intact,  [    ] other:                                                                                        Motor:   RIGHT UE: [ x  ] WNL,  [   ] other:  LEFT    UE: [ x  ] WNL,  [   ] other:  RIGHT LE: [ x  ] WNL,  [   ] other:   LEFT    LE: [   ] WNL,  [  x ] other: HF 1+/5, KE/KF 3-/5, PF/DF 5/5 limited 2/2 pain    DTRs: [ x  ]symmetric, [   ] other:                                                                    Sensory: [  x  ] Intact to light touch,   [    ] other:    MEDICATIONS  (STANDING):  acetaminophen     Tablet .. 975 milliGRAM(s) Oral every 8 hours  artificial tears (preservative free) Ophthalmic Solution 1 Drop(s) Both EYES every 6 hours  celecoxib 100 milliGRAM(s) Oral every 12 hours  chlorhexidine 2% Cloths 1 Application(s) Topical daily  cholecalciferol 2000 Unit(s) Oral daily  donepezil 10 milliGRAM(s) Oral at bedtime  enoxaparin Injectable 40 milliGRAM(s) SubCutaneous every 24 hours  escitalopram 5 milliGRAM(s) Oral daily  lidocaine   4% Patch 1 Patch Transdermal daily  midodrine. 5 milliGRAM(s) Oral <User Schedule>  montelukast 10 milliGRAM(s) Oral daily  multivitamin 1 Tablet(s) Oral daily  oxyCODONE    IR 5 milliGRAM(s) Oral <User Schedule>  pantoprazole    Tablet 40 milliGRAM(s) Oral before breakfast  polyethylene glycol 3350 17 Gram(s) Oral at bedtime    MEDICATIONS  (PRN):  albuterol    90 MICROgram(s) HFA Inhaler 2 Puff(s) Inhalation every 6 hours PRN Shortness of Breath and/or Wheezing  hydrocortisone 1% Cream 1 Application(s) Topical two times a day PRN Rash and/or Itching  melatonin 5 milliGRAM(s) Oral at bedtime PRN Insomnia  oxyCODONE    IR 5 milliGRAM(s) Oral every 4 hours PRN Moderate Pain (4 - 6)      RECENT LABS/IMAGING                        8.7    6.08  )-----------( 409      ( 07 Apr 2025 05:15 )             28.0     04-07    137  |  103  |  17  ----------------------------<  89  5.1[H]   |  26  |  0.6[L]    Ca    9.6      07 Apr 2025 05:15  Mg     2.2     04-07    TPro  7.0  /  Alb  3.8  /  TBili  0.3  /  DBili  x   /  AST  47[H]  /  ALT  58[H]  /  AlkPhos  174[H]  04-07      Urinalysis Basic - ( 07 Apr 2025 05:15 )    Color: x / Appearance: x / SG: x / pH: x  Gluc: 89 mg/dL / Ketone: x  / Bili: x / Urobili: x   Blood: x / Protein: x / Nitrite: x   Leuk Esterase: x / RBC: x / WBC x   Sq Epi: x / Non Sq Epi: x / Bacteria: x

## 2025-04-11 NOTE — PROGRESS NOTE ADULT - ATTENDING COMMENTS
Patient seen and examined with the resident. We discussed the case. I have directed the care. I edited the note. The patient requires acute rehab with 3 hours of daily therapies at least 5 out of 7 days and close physiatry follow up.  #Rehab of gait abnormality and decline in function 2/2 L hip IT fx s/p ORIF 3/26/2025  -CT LLE: Acute, impacted left femoral intertrochanteric fracture.  -c/w Tylenol 975 mg q8h ATC, celebrex 100 mg q8AM, and oxycodone 5 mg  q4h prn for pain, oxycodone 5 mg po q8am   -WBAT on the LLE  -PT/OT Eval and treat: Patient requires 3 hrs daily of interdisciplinary inpatient rehab at least 5 days a week.   -will remove staples on 4/12/2025    #Post op anemia, left thigh ecchymosis  -Hgb 11.9 on admission   -Hgb 8.7 (4/7)  -Monitor CBC, transfuse <7    #Orthostatic hypotension (improving)  Close monitoring of her vitals  500 cc NS fluid bolus 3/29  1L NS 3/30  500cc NS fluid bolus 3/31  - encouraged oral fluid intake  - S/p  cc bolus (4/2)  - increase midodrine 2.5 mg bid to 5 mg bid(7:30 am and 12:30 pm) (4/2)  - Symptomatic 4/6 dizziness, headache will give another 500cc bolus with improvement in symptoms    #Headache (4/5) resolved  #Bilateral eye discomfort (4/5) resolved  - Patient complaining of headache, noted to be hypotensive, c/w midodrine as above  - Pain management  - Artificial tears for dry eye    #Hyperkalemia  - K 5.1 on 4/7   - will monitor     #MSK Sternal and rib pain, TTP  Lidocaine patch qd  Monitor    #Dementia, Mild  She benefit from and tolerate 3 hrs of daily intensive therapies.   -C/w home med donepezil 10 mg qhs    #Anxiety/depression  -c/w home med escitalopram 5 mg daily    #Contact dermatitis (4/2)  involving buttocks  - apply hydrocortisone ointment bid     -Pain control: Tylenol 975 mg q8h, Celebrex 100 mg twice a day;  oxycodone 5 mg po q4h prn, oxycodone 5 mg po daily at 8 a.m and 12:30 pm    -GI/Bowel Mgmt: senna/Miralax

## 2025-04-11 NOTE — PROGRESS NOTE ADULT - ASSESSMENT
ASSESSMENT/PLAN    70 y.o. F with pmhx of mild dementia, anxiety bilateral cataracts presenting for mechanism fall with Left intertrochanteric hip s/p ORIF 3/24/2025. Postop coarse complicated by orthostatic hypotension postop anemia. Her hip fracture is quite painful.      #Rehab of gait abnormality and decline in function 2/2 L hip IT fx s/p ORIF 3/26/2025  -CT LLE: Acute, impacted left femoral intertrochanteric fracture.  -c/w Tylenol 975 mg q8h ATC, celebrex 100 mg q8AM, and oxycodone 5 mg  q4h prn for pain, oxycodone 5 mg po q8am   -WBAT on the LLE  -PT/OT Eval and treat: Patient requires 3 hrs daily of interdisciplinary inpatient rehab at least 5 days a week.     #Post op anemia, left thigh ecchymosis  -Hgb 11.9 on admission   -Hgb 8.7 (4/7)  -Monitor CBC, transfuse <7    #Orthostatic hypotension (improving)  Close monitoring of her vitals  500 cc NS fluid bolus 3/29  1L NS 3/30  500cc NS fluid bolus 3/31  - encouraged oral fluid intake  - S/p  cc bolus (4/2)  - increase midodrine 2.5 mg bid to 5 mg bid(7:30 am and 12:30 pm) (4/2)  - Symptomatic 4/6 dizziness, headache will give another 500cc bolus with improvement in symptoms    #Headache (4/5) resolved  #Bilateral eye discomfort (4/5) resolved  - Patient complaining of headache, noted to be hypotensive, c/w midodrine as above  - Pain management  - Artificial tears for dry eye    #Hyperkalemia  - K 5.1 on 4/7   - will monitor     #MSK Sternal and rib pain, TTP  Lidocaine patch qd  Monitor    #Dementia, Mild  She benefit from and tolerate 3 hrs of daily intensive therapies.   -C/w home med donepezil 10 mg qhs    #Anxiety/depression  -c/w home med escitalopram 5 mg daily    #Contact dermatitis (4/2)  involving buttocks  - apply hydrocortisone ointment bid     -Pain control: Tylenol 975 mg q8h, Celebrex 100 mg twice a day;  oxycodone 5 mg po q4h prn, oxycodone 5 mg po daily at 8 a.m and 12:30 pm    -GI/Bowel Mgmt: senna/Miralax     - Diet: Regular, Halal      Precautions / PROPHYLAXIS:      - Falls    - Ortho: Weight bearing status: WBAT LLE      - DVT prophylaxis: Lovenox  ASSESSMENT/PLAN    70 y.o. F with pmhx of mild dementia, anxiety bilateral cataracts presenting for mechanism fall with Left intertrochanteric hip s/p ORIF 3/24/2025. Postop coarse complicated by orthostatic hypotension postop anemia. Her hip fracture is quite painful.      #Rehab of gait abnormality and decline in function 2/2 L hip IT fx s/p ORIF 3/26/2025  -CT LLE: Acute, impacted left femoral intertrochanteric fracture.  -c/w Tylenol 975 mg q8h ATC, celebrex 100 mg q8AM, and oxycodone 5 mg  q4h prn for pain, oxycodone 5 mg po q8am   -WBAT on the LLE  -PT/OT Eval and treat: Patient requires 3 hrs daily of interdisciplinary inpatient rehab at least 5 days a week.   -will remove staples on 4/12/2025    #Post op anemia, left thigh ecchymosis  -Hgb 11.9 on admission   -Hgb 8.7 (4/7)  -Monitor CBC, transfuse <7    #Orthostatic hypotension (improving)  Close monitoring of her vitals  500 cc NS fluid bolus 3/29  1L NS 3/30  500cc NS fluid bolus 3/31  - encouraged oral fluid intake  - S/p  cc bolus (4/2)  - increase midodrine 2.5 mg bid to 5 mg bid(7:30 am and 12:30 pm) (4/2)  - Symptomatic 4/6 dizziness, headache will give another 500cc bolus with improvement in symptoms    #Headache (4/5) resolved  #Bilateral eye discomfort (4/5) resolved  - Patient complaining of headache, noted to be hypotensive, c/w midodrine as above  - Pain management  - Artificial tears for dry eye    #Hyperkalemia  - K 5.1 on 4/7   - will monitor     #MSK Sternal and rib pain, TTP  Lidocaine patch qd  Monitor    #Dementia, Mild  She benefit from and tolerate 3 hrs of daily intensive therapies.   -C/w home med donepezil 10 mg qhs    #Anxiety/depression  -c/w home med escitalopram 5 mg daily    #Contact dermatitis (4/2)  involving buttocks  - apply hydrocortisone ointment bid     -Pain control: Tylenol 975 mg q8h, Celebrex 100 mg twice a day;  oxycodone 5 mg po q4h prn, oxycodone 5 mg po daily at 8 a.m and 12:30 pm    -GI/Bowel Mgmt: senna/Miralax     - Diet: Regular, Halal      Precautions / PROPHYLAXIS:      - Falls    - Ortho: Weight bearing status: WBAT LLE      - DVT prophylaxis: Lovenox

## 2025-04-12 RX ADMIN — LIDOCAINE HYDROCHLORIDE 1 PATCH: 20 JELLY TOPICAL at 01:28

## 2025-04-12 RX ADMIN — CELECOXIB 100 MILLIGRAM(S): 50 CAPSULE ORAL at 18:14

## 2025-04-12 RX ADMIN — ESCITALOPRAM OXALATE 5 MILLIGRAM(S): 20 TABLET ORAL at 12:41

## 2025-04-12 RX ADMIN — Medication 1 DROP(S): at 12:44

## 2025-04-12 RX ADMIN — LIDOCAINE HYDROCHLORIDE 1 PATCH: 20 JELLY TOPICAL at 12:42

## 2025-04-12 RX ADMIN — LIDOCAINE HYDROCHLORIDE 1 PATCH: 20 JELLY TOPICAL at 19:00

## 2025-04-12 RX ADMIN — CELECOXIB 100 MILLIGRAM(S): 50 CAPSULE ORAL at 06:08

## 2025-04-12 RX ADMIN — DONEPEZIL HYDROCHLORIDE 10 MILLIGRAM(S): 5 TABLET ORAL at 21:43

## 2025-04-12 RX ADMIN — OXYCODONE HYDROCHLORIDE 5 MILLIGRAM(S): 30 TABLET ORAL at 08:37

## 2025-04-12 RX ADMIN — OXYCODONE HYDROCHLORIDE 5 MILLIGRAM(S): 30 TABLET ORAL at 18:18

## 2025-04-12 RX ADMIN — Medication 975 MILLIGRAM(S): at 21:43

## 2025-04-12 RX ADMIN — Medication 975 MILLIGRAM(S): at 06:08

## 2025-04-12 RX ADMIN — OXYCODONE HYDROCHLORIDE 5 MILLIGRAM(S): 30 TABLET ORAL at 12:40

## 2025-04-12 RX ADMIN — MIDODRINE HYDROCHLORIDE 5 MILLIGRAM(S): 5 TABLET ORAL at 12:41

## 2025-04-12 RX ADMIN — Medication 975 MILLIGRAM(S): at 18:16

## 2025-04-12 RX ADMIN — Medication 1 DROP(S): at 06:05

## 2025-04-12 RX ADMIN — Medication 1 TABLET(S): at 12:41

## 2025-04-12 RX ADMIN — POLYETHYLENE GLYCOL 3350 17 GRAM(S): 17 POWDER, FOR SOLUTION ORAL at 21:43

## 2025-04-12 RX ADMIN — CELECOXIB 100 MILLIGRAM(S): 50 CAPSULE ORAL at 06:06

## 2025-04-12 RX ADMIN — Medication 975 MILLIGRAM(S): at 06:06

## 2025-04-12 RX ADMIN — Medication 40 MILLIGRAM(S): at 06:07

## 2025-04-12 RX ADMIN — CELECOXIB 100 MILLIGRAM(S): 50 CAPSULE ORAL at 18:17

## 2025-04-12 RX ADMIN — MONTELUKAST SODIUM 10 MILLIGRAM(S): 10 TABLET ORAL at 12:41

## 2025-04-12 RX ADMIN — Medication 1 DROP(S): at 23:32

## 2025-04-12 RX ADMIN — ENOXAPARIN SODIUM 40 MILLIGRAM(S): 100 INJECTION SUBCUTANEOUS at 21:45

## 2025-04-12 RX ADMIN — Medication 1 APPLICATION(S): at 12:43

## 2025-04-12 RX ADMIN — Medication 2000 UNIT(S): at 12:41

## 2025-04-12 RX ADMIN — Medication 975 MILLIGRAM(S): at 22:43

## 2025-04-12 RX ADMIN — MIDODRINE HYDROCHLORIDE 5 MILLIGRAM(S): 5 TABLET ORAL at 08:37

## 2025-04-12 RX ADMIN — Medication 1 DROP(S): at 00:00

## 2025-04-12 RX ADMIN — Medication 1 DROP(S): at 18:20

## 2025-04-12 NOTE — PROGRESS NOTE ADULT - SUBJECTIVE AND OBJECTIVE BOX
Patient is a 70 y.o. old female who presents with a chief complaint of Rehab of gait abnormality and decline in function 2/2 L hip IT fx s/p ORIF 3/26/2025 (28 Mar 2025 14:30)      HPI:  71 yo F with PMH of mild dementia, anxiety, bilateral cataracts presented to the ED 3/24/2025 s/p fall. Son stated that the pt was walking down stairs and slipped on her socks while descending the final 3 steps and fell on her left side. No head trauma or LOC. In ED CT LLE showed acute, impacted left femoral intertrochanteric fracture. Patient was evaluated by orthopedics and is s/p L hip ORIF 3/24/2025. Postop course complicated by postop anemia and orthostatic hypotension s/p IVF, will continue to monitor in acute rehab.     Imaging:   XR Left Femur: Comminuted trochanteric fracture.  CTH negative  CT Cspine negative  CTAP: Acute, impacted left femoral intertrochanteric fracture. Sacral Tarlov cysts.  CT LLE: Acute, impacted left femoral intertrochanteric fracture. Soft tissues unremarkable. No hip dislocation. Degenerative change of left hip and left knee.      Prior to admission, the patient was independent in ADLs and ambulation without an assistive device. Patient now requires assistance with ambulation and ADLs 2/2 to L Hip ORIF. There is a significant functional decline from baseline. To return home it is in the patient’s best interest to have acute inpatient rehabilitative services to undergo intensive interdisciplinary therapy. Furthermore, the patient is motivated and is able to tolerate up to 3 hours of daily therapy for 5-6 days a week for a total of 15 hours a week. Evaluated by Physiatry and deemed to be an excellent candidate for admission to  for acute inpatient rehab. Admitted to Acute Rehab on 3/27/2025.    Pt seen and examined by Physiatry and is currently functioning at maxA bed mobility, modA transfers, 20’ RW modA, UBD standy-by, LBD modA.    LS: lives with family in  with 5STE and 1FOS Inside  PLOF: independent in ADLs and ambulate with no assistive device     (28 Mar 2025 14:30)    TODAY'S SUBJECTIVE & REVIEW OF SYMPTOMS  [place]  ID   Patient seen and examined this morning at bedside  No overnight events.   No new complaints;  Tolerating PT/OT and oral intake well.   Voiding bowel/bladder spontaneously and regularly.  Vital signs reviewed.       CLOF: sup/RW with bed mob and transfers; amb 50 ft x 2 using RW/TA    PHYSICAL EXAM  Vital Signs (24 Hrs):  T(C): 36.6 (04-12-25 @ 04:30), Max: 36.8 (04-11-25 @ 20:44)  HR: 60 (04-12-25 @ 04:30) (60 - 74)  BP: 101/60 (04-12-25 @ 04:30) (89/59 - 126/72)  RR: 18 (04-12-25 @ 04:30) (17 - 18)  SpO2: 100% (04-12-25 @ 04:30) (96% - 100%)  Wt(kg): --  Daily     Daily     I&O's Summary    General:[x   ] NAD, Resting Comfortable,   [   ] other:                                HEENT: [x   ] NC/AT, EOMI, PERRL , Normal Conjunctivae,   [   ] other:  Cardio: [  x ] RRR, no murmur,   [   ] other:                              Pulm: [ x  ] No Respiratory Distress,  Lungs CTAB,   [   ] other:                       Abdomen: [x   ]ND/NT, Soft,   [   ] other:    : [ x  ] NO ELIZALDE CATHETER, [   ] ELIZALDE CATHETER- no meatal tear, no discharge, [   ] other:                                            MSK: [   ] No joint swelling, Full ROM,   [  x ] other: L thigh/hip swelling, L hip TTP, L hip ecchymosis                                         Ext: [ x  ]No C/C/E, No calf tenderness,   [   ]other:    Skin: [   ]intact,   [  x ] other: surgical dressing c/d/i                                                                   Neurological Examination:  Cognitive: [    ] AAO x 3,   [x    ]  other: not oriented to year, she knows the year. She is  pleasant and able to give her history. She follows simple commands  well.                                                                Attention:  [ x   ] intact,   [    ]  other:                            Mood/Affect: [   x ] wnl,    [    ]  other:                                                                             CN II - XII:  [  x  ] intact,  [    ] other:                                                                                        Motor:   RIGHT UE: [ x  ] WNL,  [   ] other:  LEFT    UE: [ x  ] WNL,  [   ] other:  RIGHT LE: [ x  ] WNL,  [   ] other:   LEFT    LE: [   ] WNL,  [  x ] other: HF 1+/5, KE/KF 3-/5, PF/DF 5/5 limited 2/2 pain    DTRs: [ x  ]symmetric, [   ] other:                                                                    Sensory: [  x  ] Intact to light touch,   [    ] other:    MEDICATIONS  (STANDING):  acetaminophen     Tablet .. 975 milliGRAM(s) Oral every 8 hours  artificial tears (preservative free) Ophthalmic Solution 1 Drop(s) Both EYES every 6 hours  celecoxib 100 milliGRAM(s) Oral every 12 hours  chlorhexidine 2% Cloths 1 Application(s) Topical daily  cholecalciferol 2000 Unit(s) Oral daily  donepezil 10 milliGRAM(s) Oral at bedtime  enoxaparin Injectable 40 milliGRAM(s) SubCutaneous every 24 hours  escitalopram 5 milliGRAM(s) Oral daily  lidocaine   4% Patch 1 Patch Transdermal daily  midodrine. 5 milliGRAM(s) Oral <User Schedule>  montelukast 10 milliGRAM(s) Oral daily  multivitamin 1 Tablet(s) Oral daily  oxyCODONE    IR 5 milliGRAM(s) Oral <User Schedule>  pantoprazole    Tablet 40 milliGRAM(s) Oral before breakfast  polyethylene glycol 3350 17 Gram(s) Oral at bedtime    MEDICATIONS  (PRN):  albuterol    90 MICROgram(s) HFA Inhaler 2 Puff(s) Inhalation every 6 hours PRN Shortness of Breath and/or Wheezing  hydrocortisone 1% Cream 1 Application(s) Topical two times a day PRN Rash and/or Itching  melatonin 5 milliGRAM(s) Oral at bedtime PRN Insomnia  oxyCODONE    IR 5 milliGRAM(s) Oral every 4 hours PRN Moderate Pain (4 - 6)      RECENT LABS/IMAGING                        8.7    6.08  )-----------( 409      ( 07 Apr 2025 05:15 )             28.0     04-07    137  |  103  |  17  ----------------------------<  89  5.1[H]   |  26  |  0.6[L]    Ca    9.6      07 Apr 2025 05:15  Mg     2.2     04-07    TPro  7.0  /  Alb  3.8  /  TBili  0.3  /  DBili  x   /  AST  47[H]  /  ALT  58[H]  /  AlkPhos  174[H]  04-07      Urinalysis Basic - ( 07 Apr 2025 05:15 )    Color: x / Appearance: x / SG: x / pH: x  Gluc: 89 mg/dL / Ketone: x  / Bili: x / Urobili: x   Blood: x / Protein: x / Nitrite: x   Leuk Esterase: x / RBC: x / WBC x   Sq Epi: x / Non Sq Epi: x / Bacteria: x

## 2025-04-12 NOTE — PROGRESS NOTE ADULT - ATTENDING COMMENTS
Patient seen and examined with the resident. We discussed the case. I have directed the care. I edited the note. The patient requires acute rehab with 3 hours of daily therapies at least 5 out of 7 days and close physiatry follow up. Her staples were removed today an d steri strips were applied.  #Rehab of gait abnormality and decline in function 2/2 L hip IT fx s/p ORIF 3/26/2025  -CT LLE: Acute, impacted left femoral intertrochanteric fracture.  -c/w Tylenol 975 mg q8h ATC, celebrex 100 mg q8AM, and oxycodone 5 mg  q4h prn for pain, oxycodone 5 mg po q8am   -WBAT on the LLE  -PT/OT Eval and treat: Patient requires 3 hrs daily of interdisciplinary inpatient rehab at least 5 days a week.   -s/p staple removal today ( 4/12/2025)    #Post op anemia, left thigh ecchymosis  -Hgb 11.9 on admission   -Hgb 8.7 (4/7)  -Monitor CBC, transfuse <7    #Orthostatic hypotension (improving)  Close monitoring of her vitals  500 cc NS fluid bolus 3/29  1L NS 3/30  500cc NS fluid bolus 3/31  - encouraged oral fluid intake  - S/p  cc bolus (4/2)  - increase midodrine 2.5 mg bid to 5 mg bid(7:30 am and 12:30 pm) (4/2)  - Symptomatic 4/6 dizziness, headache will give another 500cc bolus with improvement in symptoms    #Headache (4/5) resolved  #Bilateral eye discomfort (4/5) resolved  - Patient complaining of headache, noted to be hypotensive, c/w midodrine as above  - Pain management  - Artificial tears for dry eye    #Hyperkalemia  - K 5.1 on 4/7   - will monitor     #MSK Sternal and rib pain, TTP  Lidocaine patch qd  Monitor    #Dementia, Mild  She benefit from and tolerate 3 hrs of daily intensive therapies.   -C/w home med donepezil 10 mg qhs    #Anxiety/depression  -c/w home med escitalopram 5 mg daily    #Contact dermatitis (4/2)  involving buttocks  - apply hydrocortisone ointment bid

## 2025-04-12 NOTE — PROGRESS NOTE ADULT - ASSESSMENT
ASSESSMENT/PLAN    70 y.o. F with pmhx of mild dementia, anxiety bilateral cataracts presenting for mechanism fall with Left intertrochanteric hip s/p ORIF 3/24/2025. Postop coarse complicated by orthostatic hypotension postop anemia. Her hip fracture is quite painful.      #Rehab of gait abnormality and decline in function 2/2 L hip IT fx s/p ORIF 3/26/2025  -CT LLE: Acute, impacted left femoral intertrochanteric fracture.  -c/w Tylenol 975 mg q8h ATC, celebrex 100 mg q8AM, and oxycodone 5 mg  q4h prn for pain, oxycodone 5 mg po q8am   -WBAT on the LLE  -PT/OT Eval and treat: Patient requires 3 hrs daily of interdisciplinary inpatient rehab at least 5 days a week.   -s/p staple removal today ( 4/12/2025)    #Post op anemia, left thigh ecchymosis  -Hgb 11.9 on admission   -Hgb 8.7 (4/7)  -Monitor CBC, transfuse <7    #Orthostatic hypotension (improving)  Close monitoring of her vitals  500 cc NS fluid bolus 3/29  1L NS 3/30  500cc NS fluid bolus 3/31  - encouraged oral fluid intake  - S/p  cc bolus (4/2)  - increase midodrine 2.5 mg bid to 5 mg bid(7:30 am and 12:30 pm) (4/2)  - Symptomatic 4/6 dizziness, headache will give another 500cc bolus with improvement in symptoms    #Headache (4/5) resolved  #Bilateral eye discomfort (4/5) resolved  - Patient complaining of headache, noted to be hypotensive, c/w midodrine as above  - Pain management  - Artificial tears for dry eye    #Hyperkalemia  - K 5.1 on 4/7   - will monitor     #MSK Sternal and rib pain, TTP  Lidocaine patch qd  Monitor    #Dementia, Mild  She benefit from and tolerate 3 hrs of daily intensive therapies.   -C/w home med donepezil 10 mg qhs    #Anxiety/depression  -c/w home med escitalopram 5 mg daily    #Contact dermatitis (4/2)  involving buttocks  - apply hydrocortisone ointment bid     -Pain control: Tylenol 975 mg q8h, Celebrex 100 mg twice a day;  oxycodone 5 mg po q4h prn, oxycodone 5 mg po daily at 8 a.m and 12:30 pm    -GI/Bowel Mgmt: senna/Miralax     - Diet: Regular, Halal      Precautions / PROPHYLAXIS:      - Falls    - Ortho: Weight bearing status: WBAT LLE      - DVT prophylaxis: Lovenox

## 2025-04-13 RX ORDER — METHOCARBAMOL 500 MG/1
1000 TABLET, FILM COATED ORAL AT BEDTIME
Refills: 0 | Status: DISCONTINUED | OUTPATIENT
Start: 2025-04-13 | End: 2025-04-14

## 2025-04-13 RX ORDER — OXYCODONE HYDROCHLORIDE 30 MG/1
5 TABLET ORAL
Refills: 0 | Status: DISCONTINUED | OUTPATIENT
Start: 2025-04-15 | End: 2025-04-14

## 2025-04-13 RX ORDER — HYPROMELLOSE 0.4 %
1 DROPS OPHTHALMIC (EYE) EVERY 4 HOURS
Refills: 0 | Status: DISCONTINUED | OUTPATIENT
Start: 2025-04-13 | End: 2025-04-14

## 2025-04-13 RX ORDER — LIDOCAINE HYDROCHLORIDE 20 MG/ML
1 JELLY TOPICAL ONCE
Refills: 0 | Status: COMPLETED | OUTPATIENT
Start: 2025-04-13 | End: 2025-04-13

## 2025-04-13 RX ADMIN — ENOXAPARIN SODIUM 40 MILLIGRAM(S): 100 INJECTION SUBCUTANEOUS at 22:05

## 2025-04-13 RX ADMIN — LIDOCAINE HYDROCHLORIDE 1 PATCH: 20 JELLY TOPICAL at 16:43

## 2025-04-13 RX ADMIN — OXYCODONE HYDROCHLORIDE 5 MILLIGRAM(S): 30 TABLET ORAL at 12:53

## 2025-04-13 RX ADMIN — LIDOCAINE HYDROCHLORIDE 1 PATCH: 20 JELLY TOPICAL at 00:51

## 2025-04-13 RX ADMIN — Medication 975 MILLIGRAM(S): at 23:46

## 2025-04-13 RX ADMIN — LIDOCAINE HYDROCHLORIDE 1 PATCH: 20 JELLY TOPICAL at 13:03

## 2025-04-13 RX ADMIN — Medication 1 APPLICATION(S): at 13:57

## 2025-04-13 RX ADMIN — MIDODRINE HYDROCHLORIDE 5 MILLIGRAM(S): 5 TABLET ORAL at 08:19

## 2025-04-13 RX ADMIN — CELECOXIB 100 MILLIGRAM(S): 50 CAPSULE ORAL at 19:54

## 2025-04-13 RX ADMIN — CELECOXIB 100 MILLIGRAM(S): 50 CAPSULE ORAL at 18:12

## 2025-04-13 RX ADMIN — Medication 1 TABLET(S): at 12:58

## 2025-04-13 RX ADMIN — Medication 1 DROP(S): at 18:11

## 2025-04-13 RX ADMIN — Medication 975 MILLIGRAM(S): at 21:59

## 2025-04-13 RX ADMIN — OXYCODONE HYDROCHLORIDE 5 MILLIGRAM(S): 30 TABLET ORAL at 18:10

## 2025-04-13 RX ADMIN — OXYCODONE HYDROCHLORIDE 5 MILLIGRAM(S): 30 TABLET ORAL at 12:58

## 2025-04-13 RX ADMIN — METHOCARBAMOL 1000 MILLIGRAM(S): 500 TABLET, FILM COATED ORAL at 21:59

## 2025-04-13 RX ADMIN — CELECOXIB 100 MILLIGRAM(S): 50 CAPSULE ORAL at 06:29

## 2025-04-13 RX ADMIN — MONTELUKAST SODIUM 10 MILLIGRAM(S): 10 TABLET ORAL at 12:58

## 2025-04-13 RX ADMIN — Medication 40 MILLIGRAM(S): at 06:29

## 2025-04-13 RX ADMIN — OXYCODONE HYDROCHLORIDE 5 MILLIGRAM(S): 30 TABLET ORAL at 08:19

## 2025-04-13 RX ADMIN — LIDOCAINE HYDROCHLORIDE 1 PATCH: 20 JELLY TOPICAL at 20:35

## 2025-04-13 RX ADMIN — Medication 1 DROP(S): at 22:01

## 2025-04-13 RX ADMIN — Medication 1 DROP(S): at 10:05

## 2025-04-13 RX ADMIN — POLYETHYLENE GLYCOL 3350 17 GRAM(S): 17 POWDER, FOR SOLUTION ORAL at 22:00

## 2025-04-13 RX ADMIN — Medication 975 MILLIGRAM(S): at 06:29

## 2025-04-13 RX ADMIN — Medication 975 MILLIGRAM(S): at 19:54

## 2025-04-13 RX ADMIN — CELECOXIB 100 MILLIGRAM(S): 50 CAPSULE ORAL at 07:06

## 2025-04-13 RX ADMIN — Medication 975 MILLIGRAM(S): at 13:40

## 2025-04-13 RX ADMIN — Medication 2000 UNIT(S): at 12:57

## 2025-04-13 RX ADMIN — ESCITALOPRAM OXALATE 5 MILLIGRAM(S): 20 TABLET ORAL at 12:58

## 2025-04-13 RX ADMIN — Medication 1 DROP(S): at 13:41

## 2025-04-13 RX ADMIN — DONEPEZIL HYDROCHLORIDE 10 MILLIGRAM(S): 5 TABLET ORAL at 22:00

## 2025-04-13 RX ADMIN — Medication 975 MILLIGRAM(S): at 07:05

## 2025-04-13 RX ADMIN — Medication 1 DROP(S): at 06:29

## 2025-04-13 RX ADMIN — MIDODRINE HYDROCHLORIDE 5 MILLIGRAM(S): 5 TABLET ORAL at 12:58

## 2025-04-13 NOTE — PROGRESS NOTE ADULT - ATTENDING COMMENTS
Patient seen and examined with the resident. We discussed the case. I have directed the care. I edited the note. The patient requires acute rehab with 3 hours of daily therapies at least 5 out of 7 days and close physiatry follow up. I added Robaxin 750 mg po qhs. A cervical pillow should be tried.  #Rehab of gait abnormality and decline in function 2/2 L hip IT fx s/p ORIF 3/26/2025  -CT LLE: Acute, impacted left femoral intertrochanteric fracture.  -c/w Tylenol 975 mg q8h ATC, celebrex 100 mg q8AM, and oxycodone 5 mg  q4h prn for pain, oxycodone 5 mg po q8am   -WBAT on the LLE  -PT/OT Eval and treat: Patient requires 3 hrs daily of interdisciplinary inpatient rehab at least 5 days a week.   - staple removed 4/12/2025    #Post op anemia, left thigh ecchymosis  -Hgb 11.9 on admission   -Hgb 8.7 (4/7)  -Monitor CBC, transfuse <7    #Orthostatic hypotension (improving)  Close monitoring of her vitals  500 cc NS fluid bolus 3/29  1L NS 3/30  500cc NS fluid bolus 3/31  - encouraged oral fluid intake  - S/p  cc bolus (4/2)  - increase midodrine 2.5 mg bid to 5 mg bid(7:30 am and 12:30 pm) (4/2)  - Symptomatic 4/6 dizziness, headache will give another 500cc bolus with improvement in symptoms    #Cervicogenic headache/neck pain  #Bilateral eye discomfort; dry eyes  - She did have a CT of cervical spine as part of the work up.   - Patient complaining of headache, noted to be hypotensive, c/w midodrine as above  - Artificial tears for dry eye - increase frequency 4/13  - On discharge will switch - stop celecoxib and start on Nabumetone 500mg BID  - Start Robaxin 1000mg QHS x 7 days 4/13  - Recommend cervical pillow - script given to family  - home PT can include stretching exercises     #Hyperkalemia  - K 5.1 on 4/7   - will monitor     #MSK Sternal and rib pain, TTP  Lidocaine patch qd  Monitor    #Dementia, Mild  She benefit from and tolerate 3 hrs of daily intensive therapies.   -C/w home med donepezil 10 mg qhs    #Anxiety/depression  -c/w home med escitalopram 5 mg daily    #Contact dermatitis (4/2)  involving buttocks  - apply hydrocortisone ointment bid     -Pain control: Tylenol 975 mg q8h, Celebrex 100 mg twice a day;  oxycodone 5 mg po q4h prn, oxycodone 5 mg po daily at 8 a.m and 12:30 pm    -GI/Bowel Mgmt: senna/Miralax

## 2025-04-13 NOTE — PROGRESS NOTE ADULT - SUBJECTIVE AND OBJECTIVE BOX
Patient is a 70 y.o. old female who presents with a chief complaint of Rehab of gait abnormality and decline in function 2/2 L hip IT fx s/p ORIF 3/26/2025 (28 Mar 2025 14:30)      HPI:  69 yo F with PMH of mild dementia, anxiety, bilateral cataracts presented to the ED 3/24/2025 s/p fall. Son stated that the pt was walking down stairs and slipped on her socks while descending the final 3 steps and fell on her left side. No head trauma or LOC. In ED CT LLE showed acute, impacted left femoral intertrochanteric fracture. Patient was evaluated by orthopedics and is s/p L hip ORIF 3/24/2025. Postop course complicated by postop anemia and orthostatic hypotension s/p IVF, will continue to monitor in acute rehab.     Imaging:   XR Left Femur: Comminuted trochanteric fracture.  CTH negative  CT Cspine negative  CTAP: Acute, impacted left femoral intertrochanteric fracture. Sacral Tarlov cysts.  CT LLE: Acute, impacted left femoral intertrochanteric fracture. Soft tissues unremarkable. No hip dislocation. Degenerative change of left hip and left knee.      Prior to admission, the patient was independent in ADLs and ambulation without an assistive device. Patient now requires assistance with ambulation and ADLs 2/2 to L Hip ORIF. There is a significant functional decline from baseline. To return home it is in the patient’s best interest to have acute inpatient rehabilitative services to undergo intensive interdisciplinary therapy. Furthermore, the patient is motivated and is able to tolerate up to 3 hours of daily therapy for 5-6 days a week for a total of 15 hours a week. Evaluated by Physiatry and deemed to be an excellent candidate for admission to  for acute inpatient rehab. Admitted to Acute Rehab on 3/27/2025.    Pt seen and examined by Physiatry and is currently functioning at maxA bed mobility, modA transfers, 20’ RW modA, UBD standy-by, LBD modA.    LS: lives with family in  with 5STE and 1FOS Inside  PLOF: independent in ADLs and ambulate with no assistive device     (28 Mar 2025 14:30)    TODAY'S SUBJECTIVE & REVIEW OF SYMPTOMS  Turkish  ID   Patient seen and examined this morning at bedside  No overnight events.   No new complaints;  Tolerating PT/OT and oral intake well.   Voiding bowel/bladder spontaneously and regularly.  Vital signs reviewed.       CLOF: sup/RW with bed mob and transfers; amb 50 ft x 2 using RW/TA    PHYSICAL EXAM  Vital Signs Last 24 Hrs  T(C): 36.4 (13 Apr 2025 04:30), Max: 36.7 (12 Apr 2025 11:22)  T(F): 97.6 (13 Apr 2025 04:30), Max: 98.1 (12 Apr 2025 11:22)  HR: 66 (13 Apr 2025 08:25) (60 - 66)  BP: 125/68 (13 Apr 2025 08:25) (94/58 - 125/68)  BP(mean): 87 (13 Apr 2025 08:25) (79 - 87)  RR: 18 (13 Apr 2025 04:30) (18 - 18)  SpO2: 98% (13 Apr 2025 04:30) (98% - 98%)    Parameters below as of 13 Apr 2025 04:30  Patient On (Oxygen Delivery Method): room air        General:[x   ] NAD, Resting Comfortable,   [   ] other:                                HEENT: [x   ] NC/AT, EOMI, PERRL , Normal Conjunctivae,   [   ] other:  Cardio: [  x ] RRR, no murmur,   [   ] other:                              Pulm: [ x  ] No Respiratory Distress,  Lungs CTAB,   [   ] other:                       Abdomen: [x   ]ND/NT, Soft,   [   ] other:    : [ x  ] NO ELIZALDE CATHETER, [   ] ELIZALDE CATHETER- no meatal tear, no discharge, [   ] other:                                            MSK: [   ] No joint swelling, Full ROM,   [  x ] other: L thigh/hip swelling, L hip mild TTP, L hip ecchymosis                                         Ext: [ x  ]No C/C/E, No calf tenderness,   [   ]other:    Skin: [   ]intact,   [  x ] other: surgical site healing well, staples removed                                                                Neurological Examination:  Cognitive: [    ] AAO x 3,   [x    ]  other: not oriented to year, she knows the year. She is  pleasant and able to give her history. She follows simple commands  well.                                                                Attention:  [ x   ] intact,   [    ]  other:                            Mood/Affect: [   x ] wnl,    [    ]  other:                                                                             CN II - XII:  [  x  ] intact,  [    ] other:                                                                                        Motor:   RIGHT UE: [ x  ] WNL,  [   ] other:  LEFT    UE: [ x  ] WNL,  [   ] other:  RIGHT LE: [ x  ] WNL,  [   ] other:   LEFT    LE: [   ] WNL,  [  x ] other: HF 1+/5, KE/KF 3-/5, PF/DF 5/5 limited 2/2 pain    DTRs: [ x  ]symmetric, [   ] other:                                                                    Sensory: [  x  ] Intact to light touch,   [    ] other:    MEDICATIONS  (STANDING):  acetaminophen     Tablet .. 975 milliGRAM(s) Oral every 8 hours  celecoxib 100 milliGRAM(s) Oral every 12 hours  chlorhexidine 2% Cloths 1 Application(s) Topical daily  cholecalciferol 2000 Unit(s) Oral daily  donepezil 10 milliGRAM(s) Oral at bedtime  enoxaparin Injectable 40 milliGRAM(s) SubCutaneous every 24 hours  escitalopram 5 milliGRAM(s) Oral daily  lidocaine   4% Patch 1 Patch Transdermal daily  midodrine. 5 milliGRAM(s) Oral <User Schedule>  montelukast 10 milliGRAM(s) Oral daily  multivitamin 1 Tablet(s) Oral daily  oxyCODONE    IR 5 milliGRAM(s) Oral <User Schedule>  pantoprazole    Tablet 40 milliGRAM(s) Oral before breakfast  polyethylene glycol 3350 17 Gram(s) Oral at bedtime    MEDICATIONS  (PRN):  albuterol    90 MICROgram(s) HFA Inhaler 2 Puff(s) Inhalation every 6 hours PRN Shortness of Breath and/or Wheezing  hydrocortisone 1% Cream 1 Application(s) Topical two times a day PRN Rash and/or Itching  melatonin 5 milliGRAM(s) Oral at bedtime PRN Insomnia  oxyCODONE    IR 5 milliGRAM(s) Oral every 4 hours PRN Moderate Pain (4 - 6)      RECENT LABS/IMAGING - reviewed Patient is a 70 y.o. old female who presents with a chief complaint of Rehab of gait abnormality and decline in function 2/2 L hip IT fx s/p ORIF 3/26/2025 (28 Mar 2025 14:30)      HPI:  71 yo F with PMH of mild dementia, anxiety, bilateral cataracts presented to the ED 3/24/2025 s/p fall. Son stated that the pt was walking down stairs and slipped on her socks while descending the final 3 steps and fell on her left side. No head trauma or LOC. In ED CT LLE showed acute, impacted left femoral intertrochanteric fracture. Patient was evaluated by orthopedics and is s/p L hip ORIF 3/24/2025. Postop course complicated by postop anemia and orthostatic hypotension s/p IVF, will continue to monitor in acute rehab.     Imaging:   XR Left Femur: Comminuted trochanteric fracture.  CTH negative  CT Cspine negative  CTAP: Acute, impacted left femoral intertrochanteric fracture. Sacral Tarlov cysts.  CT LLE: Acute, impacted left femoral intertrochanteric fracture. Soft tissues unremarkable. No hip dislocation. Degenerative change of left hip and left knee.      Prior to admission, the patient was independent in ADLs and ambulation without an assistive device. Patient now requires assistance with ambulation and ADLs 2/2 to L Hip ORIF. There is a significant functional decline from baseline. To return home it is in the patient’s best interest to have acute inpatient rehabilitative services to undergo intensive interdisciplinary therapy. Furthermore, the patient is motivated and is able to tolerate up to 3 hours of daily therapy for 5-6 days a week for a total of 15 hours a week. Evaluated by Physiatry and deemed to be an excellent candidate for admission to  for acute inpatient rehab. Admitted to Acute Rehab on 3/27/2025.    Pt seen and examined by Physiatry and is currently functioning at maxA bed mobility, modA transfers, 20’ RW modA, UBD standy-by, LBD modA.    LS: lives with family in  with 5STE and 1FOS Inside  PLOF: independent in ADLs and ambulate with no assistive device     (28 Mar 2025 14:30)    TODAY'S SUBJECTIVE & REVIEW OF SYMPTOMS  Divehi  ID   Patient seen and examined this morning at bedside  No overnight events.   No new complaints;  Tolerating PT/OT and oral intake well.   Voiding bowel/bladder spontaneously and regularly.  Vital signs reviewed.       CLOF: sup/RW with bed mob and transfers; amb 50 ft x 2 using RW/TA, setup UBD, sup LBD    PHYSICAL EXAM  Vital Signs Last 24 Hrs  T(C): 36.4 (13 Apr 2025 04:30), Max: 36.7 (12 Apr 2025 11:22)  T(F): 97.6 (13 Apr 2025 04:30), Max: 98.1 (12 Apr 2025 11:22)  HR: 66 (13 Apr 2025 08:25) (60 - 66)  BP: 125/68 (13 Apr 2025 08:25) (94/58 - 125/68)  BP(mean): 87 (13 Apr 2025 08:25) (79 - 87)  RR: 18 (13 Apr 2025 04:30) (18 - 18)  SpO2: 98% (13 Apr 2025 04:30) (98% - 98%)    Parameters below as of 13 Apr 2025 04:30  Patient On (Oxygen Delivery Method): room air        General:[x   ] NAD, Resting Comfortable,   [   ] other:                                HEENT: [x   ] NC/AT, EOMI, PERRL , Normal Conjunctivae,   [   ] other:  Cardio: [  x ] RRR, no murmur,   [   ] other:                              Pulm: [ x  ] No Respiratory Distress,  Lungs CTAB,   [   ] other:                       Abdomen: [x   ]ND/NT, Soft,   [   ] other:    : [ x  ] NO ELIZALDE CATHETER, [   ] ELIZALDE CATHETER- no meatal tear, no discharge, [   ] other:                                            MSK: [   ] No joint swelling, Full ROM,   [  x ] other: L thigh/hip swelling, L hip mild TTP, L hip ecchymosis                                         Ext: [ x  ]No C/C/E, No calf tenderness,   [   ]other:    Skin: [   ]intact,   [  x ] other: surgical site healing well, staples removed                                                                Neurological Examination:  Cognitive: [    ] AAO x 3,   [x    ]  other: not oriented to year, she knows the year. She is  pleasant and able to give her history. She follows simple commands  well.                                                                Attention:  [ x   ] intact,   [    ]  other:                            Mood/Affect: [   x ] wnl,    [    ]  other:                                                                             CN II - XII:  [  x  ] intact,  [    ] other:                                                                                        Motor:   RIGHT UE: [ x  ] WNL,  [   ] other:  LEFT    UE: [ x  ] WNL,  [   ] other:  RIGHT LE: [ x  ] WNL,  [   ] other:   LEFT    LE: [   ] WNL,  [  x ] other: HF 1+/5, KE/KF 3-/5, PF/DF 5/5 limited 2/2 pain    DTRs: [ x  ]symmetric, [   ] other:                                                                    Sensory: [  x  ] Intact to light touch,   [    ] other:    MEDICATIONS  (STANDING):  acetaminophen     Tablet .. 975 milliGRAM(s) Oral every 8 hours  celecoxib 100 milliGRAM(s) Oral every 12 hours  chlorhexidine 2% Cloths 1 Application(s) Topical daily  cholecalciferol 2000 Unit(s) Oral daily  donepezil 10 milliGRAM(s) Oral at bedtime  enoxaparin Injectable 40 milliGRAM(s) SubCutaneous every 24 hours  escitalopram 5 milliGRAM(s) Oral daily  lidocaine   4% Patch 1 Patch Transdermal daily  midodrine. 5 milliGRAM(s) Oral <User Schedule>  montelukast 10 milliGRAM(s) Oral daily  multivitamin 1 Tablet(s) Oral daily  oxyCODONE    IR 5 milliGRAM(s) Oral <User Schedule>  pantoprazole    Tablet 40 milliGRAM(s) Oral before breakfast  polyethylene glycol 3350 17 Gram(s) Oral at bedtime    MEDICATIONS  (PRN):  albuterol    90 MICROgram(s) HFA Inhaler 2 Puff(s) Inhalation every 6 hours PRN Shortness of Breath and/or Wheezing  hydrocortisone 1% Cream 1 Application(s) Topical two times a day PRN Rash and/or Itching  melatonin 5 milliGRAM(s) Oral at bedtime PRN Insomnia  oxyCODONE    IR 5 milliGRAM(s) Oral every 4 hours PRN Moderate Pain (4 - 6)      RECENT LABS/IMAGING - reviewed Patient is a 70 y.o. old female who presents with a chief complaint of Rehab of gait abnormality and decline in function 2/2 L hip IT fx s/p ORIF 3/26/2025 (28 Mar 2025 14:30)      HPI:  69 yo F with PMH of mild dementia, anxiety, bilateral cataracts presented to the ED 3/24/2025 s/p fall. Son stated that the pt was walking down stairs and slipped on her socks while descending the final 3 steps and fell on her left side. No head trauma or LOC. In ED CT LLE showed acute, impacted left femoral intertrochanteric fracture. Patient was evaluated by orthopedics and is s/p L hip ORIF 3/24/2025. Postop course complicated by postop anemia and orthostatic hypotension s/p IVF, will continue to monitor in acute rehab.     Imaging:   XR Left Femur: Comminuted trochanteric fracture.  CTH negative  CT Cspine negative  CTAP: Acute, impacted left femoral intertrochanteric fracture. Sacral Tarlov cysts.  CT LLE: Acute, impacted left femoral intertrochanteric fracture. Soft tissues unremarkable. No hip dislocation. Degenerative change of left hip and left knee.      Prior to admission, the patient was independent in ADLs and ambulation without an assistive device. Patient now requires assistance with ambulation and ADLs 2/2 to L Hip ORIF. There is a significant functional decline from baseline. To return home it is in the patient’s best interest to have acute inpatient rehabilitative services to undergo intensive interdisciplinary therapy. Furthermore, the patient is motivated and is able to tolerate up to 3 hours of daily therapy for 5-6 days a week for a total of 15 hours a week. Evaluated by Physiatry and deemed to be an excellent candidate for admission to  for acute inpatient rehab. Admitted to Acute Rehab on 3/27/2025.    Pt seen and examined by Physiatry and is currently functioning at maxA bed mobility, modA transfers, 20’ RW modA, UBD standy-by, LBD modA.    LS: lives with family in  with 5STE and 1FOS Inside  PLOF: independent in ADLs and ambulate with no assistive device     (28 Mar 2025 14:30)    TODAY'S SUBJECTIVE & REVIEW OF SYMPTOMS  Sami  ID [id number]  Patient seen and examined this morning at bedside  No overnight events. Patient complaining of headeach/eye pain this morning and some surgical site thigh pain/weakness. Advised her will switch NSAIDs on discharge planned tomorrow and start robaxin. Script was given to patient to purchase cervical neck pillow for likely tension headaches/MSK pain.   Tolerating PT/OT and oral intake well.   Voiding bowel/bladder spontaneously and regularly.  Vital signs reviewed.       CLOF: sup/RW with bed mob and transfers; amb 50 ft x 2 using RW/TA, setup UBD, sup LBD    PHYSICAL EXAM  Vital Signs Last 24 Hrs  T(C): 36.4 (13 Apr 2025 04:30), Max: 36.7 (12 Apr 2025 11:22)  T(F): 97.6 (13 Apr 2025 04:30), Max: 98.1 (12 Apr 2025 11:22)  HR: 66 (13 Apr 2025 08:25) (60 - 66)  BP: 125/68 (13 Apr 2025 08:25) (94/58 - 125/68)  BP(mean): 87 (13 Apr 2025 08:25) (79 - 87)  RR: 18 (13 Apr 2025 04:30) (18 - 18)  SpO2: 98% (13 Apr 2025 04:30) (98% - 98%)    Parameters below as of 13 Apr 2025 04:30  Patient On (Oxygen Delivery Method): room air        General:[x   ] NAD, Resting Comfortable,   [   ] other:                                HEENT: [x   ] NC/AT, EOMI, PERRL , Normal Conjunctivae,   [   ] other:  Cardio: [  x ] RRR, no murmur,   [   ] other:                              Pulm: [ x  ] No Respiratory Distress,  Lungs CTAB,   [   ] other:                       Abdomen: [x   ]ND/NT, Soft,   [   ] other:    : [ x  ] NO ELIZALDE CATHETER, [   ] ELIZALDE CATHETER- no meatal tear, no discharge, [   ] other:                                            MSK: [   ] No joint swelling, Full ROM,   [  x ] other: L thigh/hip swelling, L hip mild TTP, L hip ecchymosis                                         Ext: [ x  ]No C/C/E, No calf tenderness,   [   ]other:    Skin: [   ]intact,   [  x ] other: surgical site healing well, staples removed                                                                Neurological Examination:  Cognitive: [    ] AAO x 3,   [x    ]  other: not oriented to year, she knows the year. She is  pleasant and able to give her history. She follows simple commands  well.                                                                Attention:  [ x   ] intact,   [    ]  other:                            Mood/Affect: [   x ] wnl,    [    ]  other:                                                                             CN II - XII:  [  x  ] intact,  [    ] other:                                                                                        Motor:   RIGHT UE: [ x  ] WNL,  [   ] other:  LEFT    UE: [ x  ] WNL,  [   ] other:  RIGHT LE: [ x  ] WNL,  [   ] other:   LEFT    LE: [   ] WNL,  [  x ] other: HF 1+/5, KE/KF 3-/5, PF/DF 5/5 limited 2/2 pain    DTRs: [ x  ]symmetric, [   ] other:                                                                    Sensory: [  x  ] Intact to light touch,   [    ] other:    MEDICATIONS  (STANDING):  acetaminophen     Tablet .. 975 milliGRAM(s) Oral every 8 hours  celecoxib 100 milliGRAM(s) Oral every 12 hours  chlorhexidine 2% Cloths 1 Application(s) Topical daily  cholecalciferol 2000 Unit(s) Oral daily  donepezil 10 milliGRAM(s) Oral at bedtime  enoxaparin Injectable 40 milliGRAM(s) SubCutaneous every 24 hours  escitalopram 5 milliGRAM(s) Oral daily  lidocaine   4% Patch 1 Patch Transdermal daily  midodrine. 5 milliGRAM(s) Oral <User Schedule>  montelukast 10 milliGRAM(s) Oral daily  multivitamin 1 Tablet(s) Oral daily  oxyCODONE    IR 5 milliGRAM(s) Oral <User Schedule>  pantoprazole    Tablet 40 milliGRAM(s) Oral before breakfast  polyethylene glycol 3350 17 Gram(s) Oral at bedtime    MEDICATIONS  (PRN):  albuterol    90 MICROgram(s) HFA Inhaler 2 Puff(s) Inhalation every 6 hours PRN Shortness of Breath and/or Wheezing  hydrocortisone 1% Cream 1 Application(s) Topical two times a day PRN Rash and/or Itching  melatonin 5 milliGRAM(s) Oral at bedtime PRN Insomnia  oxyCODONE    IR 5 milliGRAM(s) Oral every 4 hours PRN Moderate Pain (4 - 6)      RECENT LABS/IMAGING - reviewed

## 2025-04-13 NOTE — PROGRESS NOTE ADULT - ASSESSMENT
ASSESSMENT/PLAN    70 y.o. F with pmhx of mild dementia, anxiety bilateral cataracts presenting for mechanism fall with Left intertrochanteric hip s/p ORIF 3/24/2025. Postop coarse complicated by orthostatic hypotension postop anemia. Her hip fracture is quite painful.      #Rehab of gait abnormality and decline in function 2/2 L hip IT fx s/p ORIF 3/26/2025  -CT LLE: Acute, impacted left femoral intertrochanteric fracture.  -c/w Tylenol 975 mg q8h ATC, celebrex 100 mg q8AM, and oxycodone 5 mg  q4h prn for pain, oxycodone 5 mg po q8am   -WBAT on the LLE  -PT/OT Eval and treat: Patient requires 3 hrs daily of interdisciplinary inpatient rehab at least 5 days a week.   - staple removed 4/12/2025    #Post op anemia, left thigh ecchymosis  -Hgb 11.9 on admission   -Hgb 8.7 (4/7)  -Monitor CBC, transfuse <7    #Orthostatic hypotension (improving)  Close monitoring of her vitals  500 cc NS fluid bolus 3/29  1L NS 3/30  500cc NS fluid bolus 3/31  - encouraged oral fluid intake  - S/p  cc bolus (4/2)  - increase midodrine 2.5 mg bid to 5 mg bid(7:30 am and 12:30 pm) (4/2)  - Symptomatic 4/6 dizziness, headache will give another 500cc bolus with improvement in symptoms    #Headache (4/5) resolved  #Bilateral eye discomfort (4/5) resolved  - Patient complaining of headache, noted to be hypotensive, c/w midodrine as above  - Pain management  - Artificial tears for dry eye    #Hyperkalemia  - K 5.1 on 4/7   - will monitor     #MSK Sternal and rib pain, TTP  Lidocaine patch qd  Monitor    #Dementia, Mild  She benefit from and tolerate 3 hrs of daily intensive therapies.   -C/w home med donepezil 10 mg qhs    #Anxiety/depression  -c/w home med escitalopram 5 mg daily    #Contact dermatitis (4/2)  involving buttocks  - apply hydrocortisone ointment bid     -Pain control: Tylenol 975 mg q8h, Celebrex 100 mg twice a day;  oxycodone 5 mg po q4h prn, oxycodone 5 mg po daily at 8 a.m and 12:30 pm    -GI/Bowel Mgmt: senna/Miralax     - Diet: Regular, Halal      Precautions / PROPHYLAXIS:      - Falls    - Ortho: Weight bearing status: WBAT LLE      - DVT prophylaxis: Lovenox  ASSESSMENT/PLAN    70 y.o. F with pmhx of mild dementia, anxiety bilateral cataracts presenting for mechanism fall with Left intertrochanteric hip s/p ORIF 3/24/2025. Postop coarse complicated by orthostatic hypotension postop anemia. Her hip fracture is quite painful.      #Rehab of gait abnormality and decline in function 2/2 L hip IT fx s/p ORIF 3/26/2025  -CT LLE: Acute, impacted left femoral intertrochanteric fracture.  -c/w Tylenol 975 mg q8h ATC, celebrex 100 mg q8AM, and oxycodone 5 mg  q4h prn for pain, oxycodone 5 mg po q8am   -WBAT on the LLE  -PT/OT Eval and treat: Patient requires 3 hrs daily of interdisciplinary inpatient rehab at least 5 days a week.   - staple removed 4/12/2025    #Post op anemia, left thigh ecchymosis  -Hgb 11.9 on admission   -Hgb 8.7 (4/7)  -Monitor CBC, transfuse <7    #Orthostatic hypotension (improving)  Close monitoring of her vitals  500 cc NS fluid bolus 3/29  1L NS 3/30  500cc NS fluid bolus 3/31  - encouraged oral fluid intake  - S/p  cc bolus (4/2)  - increase midodrine 2.5 mg bid to 5 mg bid(7:30 am and 12:30 pm) (4/2)  - Symptomatic 4/6 dizziness, headache will give another 500cc bolus with improvement in symptoms    #Headache (4/5) resolved  #Bilateral eye discomfort (4/5) resolved  - Patient complaining of headache, noted to be hypotensive, c/w midodrine as above  - Pain management  - Artificial tears for dry eye - increase frequency 4/13  - On discharge will switch - celecoxib to Nabumetone 500mg  - Recommend cervical neck pillow - script given to family    #Hyperkalemia  - K 5.1 on 4/7   - will monitor     #MSK Sternal and rib pain, TTP  Lidocaine patch qd  Monitor    #Dementia, Mild  She benefit from and tolerate 3 hrs of daily intensive therapies.   -C/w home med donepezil 10 mg qhs    #Anxiety/depression  -c/w home med escitalopram 5 mg daily    #Contact dermatitis (4/2)  involving buttocks  - apply hydrocortisone ointment bid     -Pain control: Tylenol 975 mg q8h, Celebrex 100 mg twice a day;  oxycodone 5 mg po q4h prn, oxycodone 5 mg po daily at 8 a.m and 12:30 pm    -GI/Bowel Mgmt: senna/Miralax     - Diet: Regular, Halal      Precautions / PROPHYLAXIS:      - Falls    - Ortho: Weight bearing status: WBAT LLE      - DVT prophylaxis: Lovenox  ASSESSMENT/PLAN    70 y.o. F with pmhx of mild dementia, anxiety bilateral cataracts presenting for mechanism fall with Left intertrochanteric hip s/p ORIF 3/24/2025. Postop coarse complicated by orthostatic hypotension postop anemia. Her hip fracture is quite painful.      #Rehab of gait abnormality and decline in function 2/2 L hip IT fx s/p ORIF 3/26/2025  -CT LLE: Acute, impacted left femoral intertrochanteric fracture.  -c/w Tylenol 975 mg q8h ATC, celebrex 100 mg q8AM, and oxycodone 5 mg  q4h prn for pain, oxycodone 5 mg po q8am   -WBAT on the LLE  -PT/OT Eval and treat: Patient requires 3 hrs daily of interdisciplinary inpatient rehab at least 5 days a week.   - staple removed 4/12/2025    #Post op anemia, left thigh ecchymosis  -Hgb 11.9 on admission   -Hgb 8.7 (4/7)  -Monitor CBC, transfuse <7    #Orthostatic hypotension (improving)  Close monitoring of her vitals  500 cc NS fluid bolus 3/29  1L NS 3/30  500cc NS fluid bolus 3/31  - encouraged oral fluid intake  - S/p  cc bolus (4/2)  - increase midodrine 2.5 mg bid to 5 mg bid(7:30 am and 12:30 pm) (4/2)  - Symptomatic 4/6 dizziness, headache will give another 500cc bolus with improvement in symptoms    #Headache  #Bilateral eye discomfort  - Patient complaining of headache, noted to be hypotensive, c/w midodrine as above  - Pain management  - Artificial tears for dry eye - increase frequency 4/13  - On discharge will switch - celecoxib to Nabumetone 500mg  - Start robaxin 1000mg QHS x 7 days 4/13  - Recommend cervical neck pillow - script given to family    #Hyperkalemia  - K 5.1 on 4/7   - will monitor     #MSK Sternal and rib pain, TTP  Lidocaine patch qd  Monitor    #Dementia, Mild  She benefit from and tolerate 3 hrs of daily intensive therapies.   -C/w home med donepezil 10 mg qhs    #Anxiety/depression  -c/w home med escitalopram 5 mg daily    #Contact dermatitis (4/2)  involving buttocks  - apply hydrocortisone ointment bid     -Pain control: Tylenol 975 mg q8h, Celebrex 100 mg twice a day;  oxycodone 5 mg po q4h prn, oxycodone 5 mg po daily at 8 a.m and 12:30 pm    -GI/Bowel Mgmt: senna/Miralax     - Diet: Regular, Halal      Precautions / PROPHYLAXIS:      - Falls    - Ortho: Weight bearing status: WBAT LLE      - DVT prophylaxis: Lovenox  ASSESSMENT/PLAN    70 y.o. F with pmhx of mild dementia, anxiety bilateral cataracts presenting for mechanism fall with Left intertrochanteric hip s/p ORIF 3/24/2025. Postop coarse complicated by orthostatic hypotension postop anemia. Her hip fracture is quite painful.      #Rehab of gait abnormality and decline in function 2/2 L hip IT fx s/p ORIF 3/26/2025  -CT LLE: Acute, impacted left femoral intertrochanteric fracture.  -c/w Tylenol 975 mg q8h ATC, celebrex 100 mg q8AM, and oxycodone 5 mg  q4h prn for pain, oxycodone 5 mg po q8am   -WBAT on the LLE  -PT/OT Eval and treat: Patient requires 3 hrs daily of interdisciplinary inpatient rehab at least 5 days a week.   - staple removed 4/12/2025    #Post op anemia, left thigh ecchymosis  -Hgb 11.9 on admission   -Hgb 8.7 (4/7)  -Monitor CBC, transfuse <7    #Orthostatic hypotension (improving)  Close monitoring of her vitals  500 cc NS fluid bolus 3/29  1L NS 3/30  500cc NS fluid bolus 3/31  - encouraged oral fluid intake  - S/p  cc bolus (4/2)  - increase midodrine 2.5 mg bid to 5 mg bid(7:30 am and 12:30 pm) (4/2)  - Symptomatic 4/6 dizziness, headache will give another 500cc bolus with improvement in symptoms    #Cervicogenic headache/neck pain  #Bilateral eye discomfort; dry eyes  - She did have a CT of cervical spine as part of the work up.   - Patient complaining of headache, noted to be hypotensive, c/w midodrine as above  - Artificial tears for dry eye - increase frequency 4/13  - On discharge will switch - stop celecoxib and start on Nabumetone 500mg BID  - Start Robaxin 1000mg QHS x 7 days 4/13  - Recommend cervical pillow - script given to family  - home PT can include stretching exercises     #Hyperkalemia  - K 5.1 on 4/7   - will monitor     #MSK Sternal and rib pain, TTP  Lidocaine patch qd  Monitor    #Dementia, Mild  She benefit from and tolerate 3 hrs of daily intensive therapies.   -C/w home med donepezil 10 mg qhs    #Anxiety/depression  -c/w home med escitalopram 5 mg daily    #Contact dermatitis (4/2)  involving buttocks  - apply hydrocortisone ointment bid     -Pain control: Tylenol 975 mg q8h, Celebrex 100 mg twice a day;  oxycodone 5 mg po q4h prn, oxycodone 5 mg po daily at 8 a.m and 12:30 pm    -GI/Bowel Mgmt: senna/Miralax     - Diet: Regular, Halal      Precautions / PROPHYLAXIS:      - Falls    - Ortho: Weight bearing status: WBAT LLE      - DVT prophylaxis: Lovenox

## 2025-04-14 ENCOUNTER — TRANSCRIPTION ENCOUNTER (OUTPATIENT)
Age: 70
End: 2025-04-14

## 2025-04-14 VITALS
HEART RATE: 76 BPM | SYSTOLIC BLOOD PRESSURE: 132 MMHG | DIASTOLIC BLOOD PRESSURE: 83 MMHG | OXYGEN SATURATION: 97 % | RESPIRATION RATE: 18 BRPM | TEMPERATURE: 97 F

## 2025-04-14 LAB
ALBUMIN SERPL ELPH-MCNC: 3.8 G/DL — SIGNIFICANT CHANGE UP (ref 3.5–5.2)
ALP SERPL-CCNC: 133 U/L — HIGH (ref 30–115)
ALT FLD-CCNC: 63 U/L — HIGH (ref 0–41)
ANION GAP SERPL CALC-SCNC: 9 MMOL/L — SIGNIFICANT CHANGE UP (ref 7–14)
AST SERPL-CCNC: 73 U/L — HIGH (ref 0–41)
BASOPHILS # BLD AUTO: 0.05 K/UL — SIGNIFICANT CHANGE UP (ref 0–0.2)
BASOPHILS NFR BLD AUTO: 1.1 % — HIGH (ref 0–1)
BILIRUB SERPL-MCNC: <0.2 MG/DL — SIGNIFICANT CHANGE UP (ref 0.2–1.2)
BUN SERPL-MCNC: 13 MG/DL — SIGNIFICANT CHANGE UP (ref 10–20)
CALCIUM SERPL-MCNC: 9.4 MG/DL — SIGNIFICANT CHANGE UP (ref 8.4–10.5)
CHLORIDE SERPL-SCNC: 99 MMOL/L — SIGNIFICANT CHANGE UP (ref 98–110)
CO2 SERPL-SCNC: 27 MMOL/L — SIGNIFICANT CHANGE UP (ref 17–32)
CREAT SERPL-MCNC: 0.6 MG/DL — LOW (ref 0.7–1.5)
EGFR: 96 ML/MIN/1.73M2 — SIGNIFICANT CHANGE UP
EGFR: 96 ML/MIN/1.73M2 — SIGNIFICANT CHANGE UP
EOSINOPHIL # BLD AUTO: 0.28 K/UL — SIGNIFICANT CHANGE UP (ref 0–0.7)
EOSINOPHIL NFR BLD AUTO: 6.1 % — SIGNIFICANT CHANGE UP (ref 0–8)
GLUCOSE SERPL-MCNC: 95 MG/DL — SIGNIFICANT CHANGE UP (ref 70–99)
HCT VFR BLD CALC: 28.5 % — LOW (ref 37–47)
HGB BLD-MCNC: 9.1 G/DL — LOW (ref 12–16)
IMM GRANULOCYTES NFR BLD AUTO: 0.2 % — SIGNIFICANT CHANGE UP (ref 0.1–0.3)
LYMPHOCYTES # BLD AUTO: 2.69 K/UL — SIGNIFICANT CHANGE UP (ref 1.2–3.4)
LYMPHOCYTES # BLD AUTO: 58.7 % — HIGH (ref 20.5–51.1)
MAGNESIUM SERPL-MCNC: 1.9 MG/DL — SIGNIFICANT CHANGE UP (ref 1.8–2.4)
MCHC RBC-ENTMCNC: 23.5 PG — LOW (ref 27–31)
MCHC RBC-ENTMCNC: 31.9 G/DL — LOW (ref 32–37)
MCV RBC AUTO: 73.5 FL — LOW (ref 81–99)
MONOCYTES # BLD AUTO: 0.49 K/UL — SIGNIFICANT CHANGE UP (ref 0.1–0.6)
MONOCYTES NFR BLD AUTO: 10.7 % — HIGH (ref 1.7–9.3)
NEUTROPHILS # BLD AUTO: 1.06 K/UL — LOW (ref 1.4–6.5)
NEUTROPHILS NFR BLD AUTO: 23.2 % — LOW (ref 42.2–75.2)
NRBC BLD AUTO-RTO: 0 /100 WBCS — SIGNIFICANT CHANGE UP (ref 0–0)
PLATELET # BLD AUTO: 344 K/UL — SIGNIFICANT CHANGE UP (ref 130–400)
PMV BLD: 10.2 FL — SIGNIFICANT CHANGE UP (ref 7.4–10.4)
POTASSIUM SERPL-MCNC: 4.4 MMOL/L — SIGNIFICANT CHANGE UP (ref 3.5–5)
POTASSIUM SERPL-SCNC: 4.4 MMOL/L — SIGNIFICANT CHANGE UP (ref 3.5–5)
PROT SERPL-MCNC: 6.4 G/DL — SIGNIFICANT CHANGE UP (ref 6–8)
RBC # BLD: 3.88 M/UL — LOW (ref 4.2–5.4)
RBC # FLD: 16.6 % — HIGH (ref 11.5–14.5)
SODIUM SERPL-SCNC: 135 MMOL/L — SIGNIFICANT CHANGE UP (ref 135–146)
WBC # BLD: 4.58 K/UL — LOW (ref 4.8–10.8)
WBC # FLD AUTO: 4.58 K/UL — LOW (ref 4.8–10.8)

## 2025-04-14 RX ORDER — ACETAMINOPHEN 500 MG/5ML
650 LIQUID (ML) ORAL EVERY 8 HOURS
Refills: 0 | Status: DISCONTINUED | OUTPATIENT
Start: 2025-04-14 | End: 2025-04-14

## 2025-04-14 RX ORDER — ESCITALOPRAM OXALATE 20 MG/1
1 TABLET ORAL
Qty: 30 | Refills: 0
Start: 2025-04-14 | End: 2025-05-13

## 2025-04-14 RX ORDER — OXYCODONE HYDROCHLORIDE AND ACETAMINOPHEN 10; 325 MG/1; MG/1
1 TABLET ORAL
Qty: 21 | Refills: 0
Start: 2025-04-14 | End: 2025-04-20

## 2025-04-14 RX ORDER — B1/B2/B3/B5/B6/B12/VIT C/FOLIC 500-0.5 MG
1 TABLET ORAL
Qty: 0 | Refills: 0 | DISCHARGE
Start: 2025-04-14

## 2025-04-14 RX ORDER — MONTELUKAST SODIUM 10 MG/1
1 TABLET ORAL
Refills: 0 | DISCHARGE

## 2025-04-14 RX ORDER — B1/B2/B3/B5/B6/B12/VIT C/FOLIC 500-0.5 MG
1 TABLET ORAL
Refills: 0 | DISCHARGE

## 2025-04-14 RX ORDER — NALOXONE HYDROCHLORIDE 0.4 MG/ML
1 INJECTION, SOLUTION INTRAMUSCULAR; INTRAVENOUS; SUBCUTANEOUS
Qty: 1 | Refills: 0
Start: 2025-04-14 | End: 2025-04-15

## 2025-04-14 RX ORDER — ESCITALOPRAM OXALATE 20 MG/1
1 TABLET ORAL
Refills: 0 | DISCHARGE

## 2025-04-14 RX ORDER — MONTELUKAST SODIUM 10 MG/1
1 TABLET ORAL
Qty: 30 | Refills: 0
Start: 2025-04-14 | End: 2025-05-13

## 2025-04-14 RX ORDER — DONEPEZIL HYDROCHLORIDE 5 MG/1
1 TABLET ORAL
Refills: 0 | DISCHARGE

## 2025-04-14 RX ORDER — ASPIRIN 325 MG
1 TABLET ORAL
Qty: 42 | Refills: 0
Start: 2025-04-14 | End: 2025-05-04

## 2025-04-14 RX ORDER — DONEPEZIL HYDROCHLORIDE 5 MG/1
1 TABLET ORAL
Qty: 30 | Refills: 0
Start: 2025-04-14 | End: 2025-05-13

## 2025-04-14 RX ORDER — METHOCARBAMOL 500 MG/1
1 TABLET, FILM COATED ORAL
Qty: 30 | Refills: 0
Start: 2025-04-14 | End: 2025-05-13

## 2025-04-14 RX ORDER — MIDODRINE HYDROCHLORIDE 5 MG/1
1 TABLET ORAL
Qty: 60 | Refills: 0
Start: 2025-04-14 | End: 2025-05-13

## 2025-04-14 RX ADMIN — ESCITALOPRAM OXALATE 5 MILLIGRAM(S): 20 TABLET ORAL at 12:50

## 2025-04-14 RX ADMIN — CELECOXIB 100 MILLIGRAM(S): 50 CAPSULE ORAL at 06:48

## 2025-04-14 RX ADMIN — LIDOCAINE HYDROCHLORIDE 1 PATCH: 20 JELLY TOPICAL at 01:00

## 2025-04-14 RX ADMIN — MIDODRINE HYDROCHLORIDE 5 MILLIGRAM(S): 5 TABLET ORAL at 12:33

## 2025-04-14 RX ADMIN — Medication 1 TABLET(S): at 12:33

## 2025-04-14 RX ADMIN — OXYCODONE HYDROCHLORIDE 5 MILLIGRAM(S): 30 TABLET ORAL at 12:36

## 2025-04-14 RX ADMIN — Medication 1 DROP(S): at 16:24

## 2025-04-14 RX ADMIN — CELECOXIB 100 MILLIGRAM(S): 50 CAPSULE ORAL at 06:03

## 2025-04-14 RX ADMIN — MIDODRINE HYDROCHLORIDE 5 MILLIGRAM(S): 5 TABLET ORAL at 08:14

## 2025-04-14 RX ADMIN — OXYCODONE HYDROCHLORIDE 5 MILLIGRAM(S): 30 TABLET ORAL at 12:34

## 2025-04-14 RX ADMIN — Medication 975 MILLIGRAM(S): at 06:03

## 2025-04-14 RX ADMIN — OXYCODONE HYDROCHLORIDE 5 MILLIGRAM(S): 30 TABLET ORAL at 08:14

## 2025-04-14 RX ADMIN — Medication 1 DROP(S): at 01:03

## 2025-04-14 RX ADMIN — Medication 975 MILLIGRAM(S): at 12:50

## 2025-04-14 RX ADMIN — Medication 1 DROP(S): at 06:04

## 2025-04-14 RX ADMIN — Medication 40 MILLIGRAM(S): at 06:03

## 2025-04-14 RX ADMIN — Medication 1 DROP(S): at 09:31

## 2025-04-14 RX ADMIN — LIDOCAINE HYDROCHLORIDE 1 PATCH: 20 JELLY TOPICAL at 12:32

## 2025-04-14 RX ADMIN — LIDOCAINE HYDROCHLORIDE 1 PATCH: 20 JELLY TOPICAL at 04:07

## 2025-04-14 RX ADMIN — MONTELUKAST SODIUM 10 MILLIGRAM(S): 10 TABLET ORAL at 12:35

## 2025-04-14 RX ADMIN — Medication 975 MILLIGRAM(S): at 06:48

## 2025-04-14 RX ADMIN — Medication 2000 UNIT(S): at 12:33

## 2025-04-14 RX ADMIN — Medication 1 APPLICATION(S): at 12:33

## 2025-04-14 NOTE — PROGRESS NOTE ADULT - ATTENDING COMMENTS
Patient seen and examined with the resident. We discussed the case. I have directed the care. I edited the note. She progressed wonderfully on acute rehab. She is fine to go home today and continue with home PT.  She will try a cervical pillow.  #Rehab of gait abnormality and decline in function 2/2 L hip IT fx s/p ORIF 3/26/2025  -CT LLE: Acute, impacted left femoral intertrochanteric fracture.  -c/w Tylenol 975 mg q8h ATC, celebrex 100 mg q8AM, and oxycodone 5 mg  q4h prn for pain, oxycodone 5 mg po q8am   -WBAT on the LLE  -PT/OT Eval and treat: Patient requires 3 hrs daily of interdisciplinary inpatient rehab at least 5 days a week.   - staple removed 4/12/2025    #Post op anemia, left thigh ecchymosis  -Hgb 11.9 on admission   -Hgb 8.7 (4/7)  -Monitor CBC, transfuse <7    #Orthostatic hypotension (improving)  Close monitoring of her vitals  500 cc NS fluid bolus 3/29  1L NS 3/30  500cc NS fluid bolus 3/31  - encouraged oral fluid intake  - S/p  cc bolus (4/2)  - increase midodrine 2.5 mg bid to 5 mg bid(7:30 am and 12:30 pm) (4/2)  - Symptomatic 4/6 dizziness, headache will give another 500cc bolus with improvement in symptoms    #Cervicogenic headache/neck pain  #Bilateral eye discomfort; dry eyes  - She did have a CT of cervical spine as part of the work up.   - Patient complaining of headache, noted to be hypotensive, c/w midodrine as above  - Artificial tears for dry eye - increase frequency 4/13  - On discharge will switch - stop celecoxib and start on Nabumetone 500mg BID  - Start Robaxin 1000mg QHS x 7 days 4/13  - Recommend cervical pillow - script given to family  - home PT can include stretching exercises     #Hyperkalemia (resolved)  - K 4.4 on 4/14    #Mild elevated LFTs  4/14  ALP downtrending (174->133)  AST/ALT: 47/58->73/63  - patient on standing tylenol 975 q8; will discontinue tylenol 975  - will order tylenol 650 q8 PRN for pain      #MSK Sternal and rib pain, TTP  Lidocaine patch qd  Monitor    #Dementia, Mild  She benefit from and tolerate 3 hrs of daily intensive therapies.   -C/w home med donepezil 10 mg qhs    #Anxiety/depression  -c/w home med escitalopram 5 mg daily    #Contact dermatitis (4/2)  involving buttocks  - apply hydrocortisone ointment bid     -Pain control: Tylenol 975 mg q8h, Celebrex 100 mg twice a day;  oxycodone 5 mg po q4h prn, oxycodone 5 mg po daily at 8 a.m and 12:30 pm

## 2025-04-14 NOTE — DISCHARGE NOTE NURSING/CASE MANAGEMENT/SOCIAL WORK - NSDCPEFALRISK_GEN_ALL_CORE
For information on Fall & Injury Prevention, visit: [email or web address] OR  [email or web address] OR  https://www.cdc.gov/steadi/patient.html

## 2025-04-14 NOTE — PHARMACOTHERAPY INTERVENTION NOTE - COMMENTS
LIZET WYNNE is a 70y Female admitted for Fracture of unspecified part of neck of left femur, initial encounter for closed fracture      Anticipated Discharge Date: 4/14/25    PMH:      Language service (if needed):    Medications Reviewed:  artificial tears (preservative free) Ophthalmic Solution 1 Drop(s) Both EYES every 4 hours  cholecalciferol 2000 Unit(s) Oral daily  donepezil 10 milliGRAM(s) Oral at bedtime  escitalopram 5 milliGRAM(s) Oral daily  methocarbamol 1000 milliGRAM(s) Oral at bedtime  midodrine. 5 milliGRAM(s) Orally twice daily  montelukast 10 milliGRAM(s) Oral daily  multivitamin 1 Tablet(s) Oral daily  nabumetone 500mg orally twice daily  oxyCODONE-acetaminophen 5-325mg Oral every 8 hours PRN  pantoprazole  Tablet 40 milliGRAM(s) Oral before breakfast  Naloxone nasal spray: 1 spray intranasally as needed for opioid overdose      Confirmed home medications:  Home Medications:  donepezil 10 mg oral tablet: 1 tab(s) orally once a day (14 Apr 2025 14:41)  escitalopram 5 mg oral tablet: 1 tab(s) orally once a day (14 Apr 2025 14:41)  montelukast 10 mg oral tablet: 1 tab(s) orally once a day (14 Apr 2025 14:43)    Educated patient and patient's daughter at bedside. Counseled patient to take aspirin for 3 more weeks (end date: 5/14)  [ x ] Printed medication leaflets and provided it to the patient  [ x ] Education provided on the above medications regarding indication, direction for usage, side effects, compliance  [ x ] Teachback provided and all questions answered LIZET WYNNE is a 70y Female admitted for Fracture of unspecified part of neck of left femur, initial encounter for closed fracture      Anticipated Discharge Date: 4/14/25    PMH:      Language service (if needed):    Medications Reviewed:  artificial tears (preservative free) Ophthalmic Solution 1 Drop(s) Both EYES every 4 hours  cholecalciferol 2000 Unit(s) Oral daily  donepezil 10 milliGRAM(s) Oral at bedtime  escitalopram 5 milliGRAM(s) Oral daily  methocarbamol 1000 milliGRAM(s) Oral at bedtime  midodrine. 5 milliGRAM(s) Orally twice daily  montelukast 10 milliGRAM(s) Oral daily  multivitamin 1 Tablet(s) Oral daily  nabumetone 500mg orally twice daily  oxyCODONE-acetaminophen 5-325mg Oral every 8 hours PRN  pantoprazole  Tablet 40 milliGRAM(s) Oral before breakfast  Naloxone nasal spray: 1 spray intranasally as needed for opioid overdose      Confirmed home medications:  Home Medications:  donepezil 10 mg oral tablet: 1 tab(s) orally once a day (14 Apr 2025 14:41)  escitalopram 5 mg oral tablet: 1 tab(s) orally once a day (14 Apr 2025 14:41)  montelukast 10 mg oral tablet: 1 tab(s) orally once a day (14 Apr 2025 14:43)    Educated patient and patient's daughter at bedside. Counseled patient to take aspirin for 3 more weeks (end date: 5/14). Patient's daughter requested a prescription for blood pressure monitor. Spoke with WISAM Kebede regarding BP monitor.  [ x ] Printed medication leaflets and provided it to the patient  [ x ] Education provided on the above medications regarding indication, direction for usage, side effects, compliance  [ x ] Teachback provided and all questions answered

## 2025-04-14 NOTE — PROGRESS NOTE ADULT - SUBJECTIVE AND OBJECTIVE BOX
Patient is a 70 y.o. old female who presents with a chief complaint of Rehab of gait abnormality and decline in function 2/2 L hip IT fx s/p ORIF 3/26/2025 (28 Mar 2025 14:30)      HPI:  69 yo F with PMH of mild dementia, anxiety, bilateral cataracts presented to the ED 3/24/2025 s/p fall. Son stated that the pt was walking down stairs and slipped on her socks while descending the final 3 steps and fell on her left side. No head trauma or LOC. In ED CT LLE showed acute, impacted left femoral intertrochanteric fracture. Patient was evaluated by orthopedics and is s/p L hip ORIF 3/24/2025. Postop course complicated by postop anemia and orthostatic hypotension s/p IVF, will continue to monitor in acute rehab.     Imaging:   XR Left Femur: Comminuted trochanteric fracture.  CTH negative  CT Cspine negative  CTAP: Acute, impacted left femoral intertrochanteric fracture. Sacral Tarlov cysts.  CT LLE: Acute, impacted left femoral intertrochanteric fracture. Soft tissues unremarkable. No hip dislocation. Degenerative change of left hip and left knee.      Prior to admission, the patient was independent in ADLs and ambulation without an assistive device. Patient now requires assistance with ambulation and ADLs 2/2 to L Hip ORIF. There is a significant functional decline from baseline. To return home it is in the patient’s best interest to have acute inpatient rehabilitative services to undergo intensive interdisciplinary therapy. Furthermore, the patient is motivated and is able to tolerate up to 3 hours of daily therapy for 5-6 days a week for a total of 15 hours a week. Evaluated by Physiatry and deemed to be an excellent candidate for admission to  for acute inpatient rehab. Admitted to Acute Rehab on 3/27/2025.    Pt seen and examined by Physiatry and is currently functioning at maxA bed mobility, modA transfers, 20’ RW modA, UBD standy-by, LBD modA.    LS: lives with family in  with 5STE and 1FOS Inside  PLOF: independent in ADLs and ambulate with no assistive device     (28 Mar 2025 14:30)    TODAY'S SUBJECTIVE & REVIEW OF SYMPTOMS  [place]  ID   Patient seen and examined this morning at bedside  No overnight events.   Tolerating PT/OT and oral intake well.   Voiding bowel/bladder spontaneously and regularly.  Vital signs reviewed.   Will follow up labs.  Will discharge home today.    4/13/2025:   Patient complaining of headeach/eye pain this morning and some surgical site thigh pain/weakness.   Advised her will switch NSAIDs on discharge planned tomorrow and start robaxin.   Script was given to patient to purchase cervical neck pillow for likely tension headaches/MSK pain.     CLOF: sup/RW with bed mob and transfers; amb 50 ft x 2 using RW/TA, setup UBD, sup LBD    PHYSICAL EXAM  Vital Signs (24 Hrs):  T(C): 36.7 (04-14-25 @ 06:03), Max: 36.7 (04-14-25 @ 06:03)  HR: 83 (04-14-25 @ 06:03) (60 - 83)  BP: 107/67 (04-14-25 @ 06:03) (100/63 - 125/68)  RR: 18 (04-14-25 @ 06:03) (18 - 18)  SpO2: 97% (04-14-25 @ 06:03) (96% - 97%)  Wt(kg): --  Daily     Daily     I&O's Summary      General:[x   ] NAD, Resting Comfortable,   [   ] other:                                HEENT: [x   ] NC/AT, EOMI, PERRL , Normal Conjunctivae,   [   ] other:  Cardio: [  x ] RRR, no murmur,   [   ] other:                              Pulm: [ x  ] No Respiratory Distress,  Lungs CTAB,   [   ] other:                       Abdomen: [x   ]ND/NT, Soft,   [   ] other:    : [ x  ] NO ELIZALDE CATHETER, [   ] ELIZALDE CATHETER- no meatal tear, no discharge, [   ] other:                                            MSK: [   ] No joint swelling, Full ROM,   [  x ] other: L thigh/hip swelling, L hip mild TTP, L hip ecchymosis                                         Ext: [ x  ]No C/C/E, No calf tenderness,   [   ]other:    Skin: [   ]intact,   [  x ] other: surgical site healing well, staples removed                                                                Neurological Examination:  Cognitive: [    ] AAO x 3,   [x    ]  other: not oriented to year, she knows the year. She is  pleasant and able to give her history. She follows simple commands  well.                                                                Attention:  [ x   ] intact,   [    ]  other:                            Mood/Affect: [   x ] wnl,    [    ]  other:                                                                             CN II - XII:  [  x  ] intact,  [    ] other:                                                                                        Motor:   RIGHT UE: [ x  ] WNL,  [   ] other:  LEFT    UE: [ x  ] WNL,  [   ] other:  RIGHT LE: [ x  ] WNL,  [   ] other:   LEFT    LE: [   ] WNL,  [  x ] other: HF 1+/5, KE/KF 3-/5, PF/DF 5/5 limited 2/2 pain    DTRs: [ x  ]symmetric, [   ] other:                                                                    Sensory: [  x  ] Intact to light touch,   [    ] other:    MEDICATIONS  (STANDING):  acetaminophen     Tablet .. 975 milliGRAM(s) Oral every 8 hours  artificial tears (preservative free) Ophthalmic Solution 1 Drop(s) Both EYES every 4 hours  celecoxib 100 milliGRAM(s) Oral every 12 hours  chlorhexidine 2% Cloths 1 Application(s) Topical daily  cholecalciferol 2000 Unit(s) Oral daily  donepezil 10 milliGRAM(s) Oral at bedtime  enoxaparin Injectable 40 milliGRAM(s) SubCutaneous every 24 hours  escitalopram 5 milliGRAM(s) Oral daily  lidocaine   4% Patch 1 Patch Transdermal daily  methocarbamol 1000 milliGRAM(s) Oral at bedtime  midodrine. 5 milliGRAM(s) Oral <User Schedule>  montelukast 10 milliGRAM(s) Oral daily  multivitamin 1 Tablet(s) Oral daily  oxyCODONE    IR 5 milliGRAM(s) Oral <User Schedule>  pantoprazole    Tablet 40 milliGRAM(s) Oral before breakfast  polyethylene glycol 3350 17 Gram(s) Oral at bedtime    MEDICATIONS  (PRN):  albuterol    90 MICROgram(s) HFA Inhaler 2 Puff(s) Inhalation every 6 hours PRN Shortness of Breath and/or Wheezing  hydrocortisone 1% Cream 1 Application(s) Topical two times a day PRN Rash and/or Itching  melatonin 5 milliGRAM(s) Oral at bedtime PRN Insomnia  oxyCODONE    IR 5 milliGRAM(s) Oral every 4 hours PRN Moderate Pain (4 - 6)      RECENT LABS/IMAGING -   Patient is a 70 y.o. old female who presents with a chief complaint of Rehab of gait abnormality and decline in function 2/2 L hip IT fx s/p ORIF 3/26/2025 (28 Mar 2025 14:30)      HPI:  69 yo F with PMH of mild dementia, anxiety, bilateral cataracts presented to the ED 3/24/2025 s/p fall. Son stated that the pt was walking down stairs and slipped on her socks while descending the final 3 steps and fell on her left side. No head trauma or LOC. In ED CT LLE showed acute, impacted left femoral intertrochanteric fracture. Patient was evaluated by orthopedics and is s/p L hip ORIF 3/24/2025. Postop course complicated by postop anemia and orthostatic hypotension s/p IVF, will continue to monitor in acute rehab.     Imaging:   XR Left Femur: Comminuted trochanteric fracture.  CTH negative  CT Cspine negative  CTAP: Acute, impacted left femoral intertrochanteric fracture. Sacral Tarlov cysts.  CT LLE: Acute, impacted left femoral intertrochanteric fracture. Soft tissues unremarkable. No hip dislocation. Degenerative change of left hip and left knee.      Prior to admission, the patient was independent in ADLs and ambulation without an assistive device. Patient now requires assistance with ambulation and ADLs 2/2 to L Hip ORIF. There is a significant functional decline from baseline. To return home it is in the patient’s best interest to have acute inpatient rehabilitative services to undergo intensive interdisciplinary therapy. Furthermore, the patient is motivated and is able to tolerate up to 3 hours of daily therapy for 5-6 days a week for a total of 15 hours a week. Evaluated by Physiatry and deemed to be an excellent candidate for admission to  for acute inpatient rehab. Admitted to Acute Rehab on 3/27/2025.    Pt seen and examined by Physiatry and is currently functioning at maxA bed mobility, modA transfers, 20’ RW modA, UBD standy-by, LBD modA.    LS: lives with family in  with 5STE and 1FOS Inside  PLOF: independent in ADLs and ambulate with no assistive device     (28 Mar 2025 14:30)    TODAY'S SUBJECTIVE & REVIEW OF SYMPTOMS  [place]  ID   Patient seen and examined this morning at bedside  No overnight events.   Tolerating PT/OT and oral intake well.   Voiding bowel/bladder spontaneously and regularly.  Vital signs reviewed.   Labs reviewed  ALP downtrending (174->133)  AST/ALT: 47/58->73/63    Will discharge home today.    4/13/2025:   Patient complaining of headeach/eye pain this morning and some surgical site thigh pain/weakness.   Advised her will switch NSAIDs on discharge planned tomorrow and start robaxin.   Script was given to patient to purchase cervical neck pillow for likely tension headaches/MSK pain.     CLOF: sup/RW with bed mob and transfers; amb 50 ft x 2 using RW/TA, setup UBD, sup LBD    PHYSICAL EXAM  Vital Signs (24 Hrs):  T(C): 36.7 (04-14-25 @ 06:03), Max: 36.7 (04-14-25 @ 06:03)  HR: 83 (04-14-25 @ 06:03) (60 - 83)  BP: 107/67 (04-14-25 @ 06:03) (100/63 - 125/68)  RR: 18 (04-14-25 @ 06:03) (18 - 18)  SpO2: 97% (04-14-25 @ 06:03) (96% - 97%)  Wt(kg): --  Daily     Daily     I&O's Summary      General:[x   ] NAD, Resting Comfortable,   [   ] other:                                HEENT: [x   ] NC/AT, EOMI, PERRL , Normal Conjunctivae,   [   ] other:  Cardio: [  x ] RRR, no murmur,   [   ] other:                              Pulm: [ x  ] No Respiratory Distress,  Lungs CTAB,   [   ] other:                       Abdomen: [x   ]ND/NT, Soft,   [   ] other:    : [ x  ] NO ELIZALDE CATHETER, [   ] ELIZALDE CATHETER- no meatal tear, no discharge, [   ] other:                                            MSK: [   ] No joint swelling, Full ROM,   [  x ] other: L thigh/hip swelling, L hip mild TTP, L hip ecchymosis                                         Ext: [ x  ]No C/C/E, No calf tenderness,   [   ]other:    Skin: [   ]intact,   [  x ] other: surgical site healing well, staples removed                                                                Neurological Examination:  Cognitive: [    ] AAO x 3,   [x    ]  other: not oriented to year, she knows the year. She is  pleasant and able to give her history. She follows simple commands  well.                                                                Attention:  [ x   ] intact,   [    ]  other:                            Mood/Affect: [   x ] wnl,    [    ]  other:                                                                             CN II - XII:  [  x  ] intact,  [    ] other:                                                                                        Motor:   RIGHT UE: [ x  ] WNL,  [   ] other:  LEFT    UE: [ x  ] WNL,  [   ] other:  RIGHT LE: [ x  ] WNL,  [   ] other:   LEFT    LE: [   ] WNL,  [  x ] other: HF 1+/5, KE/KF 3-/5, PF/DF 5/5 limited 2/2 pain    DTRs: [ x  ]symmetric, [   ] other:                                                                    Sensory: [  x  ] Intact to light touch,   [    ] other:    MEDICATIONS  (STANDING):  acetaminophen     Tablet .. 975 milliGRAM(s) Oral every 8 hours  artificial tears (preservative free) Ophthalmic Solution 1 Drop(s) Both EYES every 4 hours  celecoxib 100 milliGRAM(s) Oral every 12 hours  chlorhexidine 2% Cloths 1 Application(s) Topical daily  cholecalciferol 2000 Unit(s) Oral daily  donepezil 10 milliGRAM(s) Oral at bedtime  enoxaparin Injectable 40 milliGRAM(s) SubCutaneous every 24 hours  escitalopram 5 milliGRAM(s) Oral daily  lidocaine   4% Patch 1 Patch Transdermal daily  methocarbamol 1000 milliGRAM(s) Oral at bedtime  midodrine. 5 milliGRAM(s) Oral <User Schedule>  montelukast 10 milliGRAM(s) Oral daily  multivitamin 1 Tablet(s) Oral daily  oxyCODONE    IR 5 milliGRAM(s) Oral <User Schedule>  pantoprazole    Tablet 40 milliGRAM(s) Oral before breakfast  polyethylene glycol 3350 17 Gram(s) Oral at bedtime    MEDICATIONS  (PRN):  albuterol    90 MICROgram(s) HFA Inhaler 2 Puff(s) Inhalation every 6 hours PRN Shortness of Breath and/or Wheezing  hydrocortisone 1% Cream 1 Application(s) Topical two times a day PRN Rash and/or Itching  melatonin 5 milliGRAM(s) Oral at bedtime PRN Insomnia  oxyCODONE    IR 5 milliGRAM(s) Oral every 4 hours PRN Moderate Pain (4 - 6)      RECENT LABS/IMAGING -                          9.1    4.58  )-----------( 344      ( 14 Apr 2025 06:38 )             28.5     04-14    135  |  99  |  13  ----------------------------<  95  4.4   |  27  |  0.6[L]    Ca    9.4      14 Apr 2025 06:38  Mg     1.9     04-14    TPro  6.4  /  Alb  3.8  /  TBili  <0.2  /  DBili  x   /  AST  73[H]  /  ALT  63[H]  /  AlkPhos  133[H]  04-14      Urinalysis Basic - ( 14 Apr 2025 06:38 )    Color: x / Appearance: x / SG: x / pH: x  Gluc: 95 mg/dL / Ketone: x  / Bili: x / Urobili: x   Blood: x / Protein: x / Nitrite: x   Leuk Esterase: x / RBC: x / WBC x   Sq Epi: x / Non Sq Epi: x / Bacteria: x         Patient is a 70 y.o. old female who presents with a chief complaint of Rehab of gait abnormality and decline in function 2/2 L hip IT fx s/p ORIF 3/26/2025 (28 Mar 2025 14:30)      HPI:  71 yo F with PMH of mild dementia, anxiety, bilateral cataracts presented to the ED 3/24/2025 s/p fall. Son stated that the pt was walking down stairs and slipped on her socks while descending the final 3 steps and fell on her left side. No head trauma or LOC. In ED CT LLE showed acute, impacted left femoral intertrochanteric fracture. Patient was evaluated by orthopedics and is s/p L hip ORIF 3/24/2025. Postop course complicated by postop anemia and orthostatic hypotension s/p IVF, will continue to monitor in acute rehab.     Imaging:   XR Left Femur: Comminuted trochanteric fracture.  CTH negative  CT Cspine negative  CTAP: Acute, impacted left femoral intertrochanteric fracture. Sacral Tarlov cysts.  CT LLE: Acute, impacted left femoral intertrochanteric fracture. Soft tissues unremarkable. No hip dislocation. Degenerative change of left hip and left knee.      Prior to admission, the patient was independent in ADLs and ambulation without an assistive device. Patient now requires assistance with ambulation and ADLs 2/2 to L Hip ORIF. There is a significant functional decline from baseline. To return home it is in the patient’s best interest to have acute inpatient rehabilitative services to undergo intensive interdisciplinary therapy. Furthermore, the patient is motivated and is able to tolerate up to 3 hours of daily therapy for 5-6 days a week for a total of 15 hours a week. Evaluated by Physiatry and deemed to be an excellent candidate for admission to  for acute inpatient rehab. Admitted to Acute Rehab on 3/27/2025.    Pt seen and examined by Physiatry and is currently functioning at maxA bed mobility, modA transfers, 20’ RW modA, UBD standy-by, LBD modA.    LS: lives with family in  with 5STE and 1FOS Inside  PLOF: independent in ADLs and ambulate with no assistive device     (28 Mar 2025 14:30)    TODAY'S SUBJECTIVE & REVIEW OF SYMPTOMS  [place]  ID   Patient seen and examined this morning at bedside  No overnight events.   Tolerating PT/OT and oral intake well.   Voiding bowel/bladder spontaneously and regularly.  Vital signs reviewed.   Labs reviewed  ALP downtrending (174->133)  AST/ALT: 47/58->73/63  Will dc tylenol    Will discharge home today.    4/13/2025:   Patient complaining of headeach/eye pain this morning and some surgical site thigh pain/weakness.   Advised her will switch NSAIDs on discharge planned tomorrow and start robaxin.   Script was given to patient to purchase cervical neck pillow for likely tension headaches/MSK pain.     CLOF: sup/RW with bed mob and transfers; amb 50 ft x 2 using RW/TA, setup UBD, sup LBD    PHYSICAL EXAM  Vital Signs (24 Hrs):  T(C): 36.7 (04-14-25 @ 06:03), Max: 36.7 (04-14-25 @ 06:03)  HR: 83 (04-14-25 @ 06:03) (60 - 83)  BP: 107/67 (04-14-25 @ 06:03) (100/63 - 125/68)  RR: 18 (04-14-25 @ 06:03) (18 - 18)  SpO2: 97% (04-14-25 @ 06:03) (96% - 97%)  Wt(kg): --  Daily     Daily     I&O's Summary      General:[x   ] NAD, Resting Comfortable,   [   ] other:                                HEENT: [x   ] NC/AT, EOMI, PERRL , Normal Conjunctivae,   [   ] other:  Cardio: [  x ] RRR, no murmur,   [   ] other:                              Pulm: [ x  ] No Respiratory Distress,  Lungs CTAB,   [   ] other:                       Abdomen: [x   ]ND/NT, Soft,   [   ] other:    : [ x  ] NO ELIZALDE CATHETER, [   ] ELIZALDE CATHETER- no meatal tear, no discharge, [   ] other:                                            MSK: [   ] No joint swelling, Full ROM,   [  x ] other: L thigh/hip swelling, L hip mild TTP, L hip ecchymosis                                         Ext: [ x  ]No C/C/E, No calf tenderness,   [   ]other:    Skin: [   ]intact,   [  x ] other: surgical site healing well, staples removed                                                                Neurological Examination:  Cognitive: [    ] AAO x 3,   [x    ]  other: not oriented to year, she knows the year. She is  pleasant and able to give her history. She follows simple commands  well.                                                                Attention:  [ x   ] intact,   [    ]  other:                            Mood/Affect: [   x ] wnl,    [    ]  other:                                                                             CN II - XII:  [  x  ] intact,  [    ] other:                                                                                        Motor:   RIGHT UE: [ x  ] WNL,  [   ] other:  LEFT    UE: [ x  ] WNL,  [   ] other:  RIGHT LE: [ x  ] WNL,  [   ] other:   LEFT    LE: [   ] WNL,  [  x ] other: HF 1+/5, KE/KF 3-/5, PF/DF 5/5 limited 2/2 pain    DTRs: [ x  ]symmetric, [   ] other:                                                                    Sensory: [  x  ] Intact to light touch,   [    ] other:    MEDICATIONS  (STANDING):  acetaminophen     Tablet .. 975 milliGRAM(s) Oral every 8 hours  artificial tears (preservative free) Ophthalmic Solution 1 Drop(s) Both EYES every 4 hours  celecoxib 100 milliGRAM(s) Oral every 12 hours  chlorhexidine 2% Cloths 1 Application(s) Topical daily  cholecalciferol 2000 Unit(s) Oral daily  donepezil 10 milliGRAM(s) Oral at bedtime  enoxaparin Injectable 40 milliGRAM(s) SubCutaneous every 24 hours  escitalopram 5 milliGRAM(s) Oral daily  lidocaine   4% Patch 1 Patch Transdermal daily  methocarbamol 1000 milliGRAM(s) Oral at bedtime  midodrine. 5 milliGRAM(s) Oral <User Schedule>  montelukast 10 milliGRAM(s) Oral daily  multivitamin 1 Tablet(s) Oral daily  oxyCODONE    IR 5 milliGRAM(s) Oral <User Schedule>  pantoprazole    Tablet 40 milliGRAM(s) Oral before breakfast  polyethylene glycol 3350 17 Gram(s) Oral at bedtime    MEDICATIONS  (PRN):  albuterol    90 MICROgram(s) HFA Inhaler 2 Puff(s) Inhalation every 6 hours PRN Shortness of Breath and/or Wheezing  hydrocortisone 1% Cream 1 Application(s) Topical two times a day PRN Rash and/or Itching  melatonin 5 milliGRAM(s) Oral at bedtime PRN Insomnia  oxyCODONE    IR 5 milliGRAM(s) Oral every 4 hours PRN Moderate Pain (4 - 6)      RECENT LABS/IMAGING -                          9.1    4.58  )-----------( 344      ( 14 Apr 2025 06:38 )             28.5     04-14    135  |  99  |  13  ----------------------------<  95  4.4   |  27  |  0.6[L]    Ca    9.4      14 Apr 2025 06:38  Mg     1.9     04-14    TPro  6.4  /  Alb  3.8  /  TBili  <0.2  /  DBili  x   /  AST  73[H]  /  ALT  63[H]  /  AlkPhos  133[H]  04-14      Urinalysis Basic - ( 14 Apr 2025 06:38 )    Color: x / Appearance: x / SG: x / pH: x  Gluc: 95 mg/dL / Ketone: x  / Bili: x / Urobili: x   Blood: x / Protein: x / Nitrite: x   Leuk Esterase: x / RBC: x / WBC x   Sq Epi: x / Non Sq Epi: x / Bacteria: x         Patient is a 70 y.o. old female who presents with a chief complaint of Rehab of gait abnormality and decline in function 2/2 L hip IT fx s/p ORIF 3/26/2025 (28 Mar 2025 14:30)      HPI:  71 yo F with PMH of mild dementia, anxiety, bilateral cataracts presented to the ED 3/24/2025 s/p fall. Son stated that the pt was walking down stairs and slipped on her socks while descending the final 3 steps and fell on her left side. No head trauma or LOC. In ED CT LLE showed acute, impacted left femoral intertrochanteric fracture. Patient was evaluated by orthopedics and is s/p L hip ORIF 3/24/2025. Postop course complicated by postop anemia and orthostatic hypotension s/p IVF, will continue to monitor in acute rehab.     Imaging:   XR Left Femur: Comminuted trochanteric fracture.  CTH negative  CT Cspine negative  CTAP: Acute, impacted left femoral intertrochanteric fracture. Sacral Tarlov cysts.  CT LLE: Acute, impacted left femoral intertrochanteric fracture. Soft tissues unremarkable. No hip dislocation. Degenerative change of left hip and left knee.      Prior to admission, the patient was independent in ADLs and ambulation without an assistive device. Patient now requires assistance with ambulation and ADLs 2/2 to L Hip ORIF. There is a significant functional decline from baseline. To return home it is in the patient’s best interest to have acute inpatient rehabilitative services to undergo intensive interdisciplinary therapy. Furthermore, the patient is motivated and is able to tolerate up to 3 hours of daily therapy for 5-6 days a week for a total of 15 hours a week. Evaluated by Physiatry and deemed to be an excellent candidate for admission to  for acute inpatient rehab. Admitted to Acute Rehab on 3/27/2025.    Pt seen and examined by Physiatry and is currently functioning at maxA bed mobility, modA transfers, 20’ RW modA, UBD standy-by, LBD modA.    LS: lives with family in  with 5STE and 1FOS Inside  PLOF: independent in ADLs and ambulate with no assistive device     (28 Mar 2025 14:30)    TODAY'S SUBJECTIVE & REVIEW OF SYMPTOMS  [place]  ID [id number]  Patient seen and examined this morning at bedside  No overnight events.   Tolerating PT/OT and oral intake well.   Voiding bowel/bladder spontaneously and regularly.  Vital signs reviewed.   Labs reviewed  ALP downtrending (174->133)  AST/ALT: 47/58->73/63  Will dc tylenol    Will discharge home today.    4/13/2025:   Patient complaining of headeach/eye pain this morning and some surgical site thigh pain/weakness.   Advised her will switch NSAIDs on discharge planned tomorrow and start robaxin.   Script was given to patient to purchase cervical neck pillow for likely tension headaches/MSK pain.     CLOF: sup/RW with bed mob and transfers; amb 50 ft x 2 using RW/TA, setup UBD, sup LBD    PHYSICAL EXAM  Vital Signs (24 Hrs):  T(C): 36.7 (04-14-25 @ 06:03), Max: 36.7 (04-14-25 @ 06:03)  HR: 83 (04-14-25 @ 06:03) (60 - 83)  BP: 107/67 (04-14-25 @ 06:03) (100/63 - 125/68)  RR: 18 (04-14-25 @ 06:03) (18 - 18)  SpO2: 97% (04-14-25 @ 06:03) (96% - 97%)  Wt(kg): --  Daily     Daily     I&O's Summary      General:[x   ] NAD, Resting Comfortable,   [   ] other:                                HEENT: [x   ] NC/AT, EOMI, PERRL , Normal Conjunctivae,   [   ] other:  Cardio: [  x ] RRR, no murmur,   [   ] other:                              Pulm: [ x  ] No Respiratory Distress,  Lungs CTAB,   [   ] other:                       Abdomen: [x   ]ND/NT, Soft,   [   ] other:    : [ x  ] NO ELIZALDE CATHETER, [   ] ELIZALDE CATHETER- no meatal tear, no discharge, [   ] other:                                            MSK: [   ] No joint swelling, Full ROM,   [  x ] other: L thigh/hip swelling, L hip mild TTP, L hip ecchymosis                                         Ext: [ x  ]No C/C/E, No calf tenderness,   [   ]other:    Skin: [   ]intact,   [  x ] other: surgical site healing well, staples removed                                                                Neurological Examination:  Cognitive: [    ] AAO x 3,   [x    ]  other: not oriented to year, she knows the year. She is  pleasant and able to give her history. She follows simple commands  well.                                                                Attention:  [ x   ] intact,   [    ]  other:                            Mood/Affect: [   x ] wnl,    [    ]  other:                                                                             CN II - XII:  [  x  ] intact,  [    ] other:                                                                                        Motor:   RIGHT UE: [ x  ] WNL,  [   ] other:  LEFT    UE: [ x  ] WNL,  [   ] other:  RIGHT LE: [ x  ] WNL,  [   ] other:   LEFT    LE: [   ] WNL,  [  x ] other: HF 1+/5, KE/KF 3-/5, PF/DF 5/5 limited 2/2 pain    DTRs: [ x  ]symmetric, [   ] other:                                                                    Sensory: [  x  ] Intact to light touch,   [    ] other:    MEDICATIONS  (STANDING):  acetaminophen     Tablet .. 975 milliGRAM(s) Oral every 8 hours  artificial tears (preservative free) Ophthalmic Solution 1 Drop(s) Both EYES every 4 hours  celecoxib 100 milliGRAM(s) Oral every 12 hours  chlorhexidine 2% Cloths 1 Application(s) Topical daily  cholecalciferol 2000 Unit(s) Oral daily  donepezil 10 milliGRAM(s) Oral at bedtime  enoxaparin Injectable 40 milliGRAM(s) SubCutaneous every 24 hours  escitalopram 5 milliGRAM(s) Oral daily  lidocaine   4% Patch 1 Patch Transdermal daily  methocarbamol 1000 milliGRAM(s) Oral at bedtime  midodrine. 5 milliGRAM(s) Oral <User Schedule>  montelukast 10 milliGRAM(s) Oral daily  multivitamin 1 Tablet(s) Oral daily  oxyCODONE    IR 5 milliGRAM(s) Oral <User Schedule>  pantoprazole    Tablet 40 milliGRAM(s) Oral before breakfast  polyethylene glycol 3350 17 Gram(s) Oral at bedtime    MEDICATIONS  (PRN):  albuterol    90 MICROgram(s) HFA Inhaler 2 Puff(s) Inhalation every 6 hours PRN Shortness of Breath and/or Wheezing  hydrocortisone 1% Cream 1 Application(s) Topical two times a day PRN Rash and/or Itching  melatonin 5 milliGRAM(s) Oral at bedtime PRN Insomnia  oxyCODONE    IR 5 milliGRAM(s) Oral every 4 hours PRN Moderate Pain (4 - 6)      RECENT LABS/IMAGING -                          9.1    4.58  )-----------( 344      ( 14 Apr 2025 06:38 )             28.5     04-14    135  |  99  |  13  ----------------------------<  95  4.4   |  27  |  0.6[L]    Ca    9.4      14 Apr 2025 06:38  Mg     1.9     04-14    TPro  6.4  /  Alb  3.8  /  TBili  <0.2  /  DBili  x   /  AST  73[H]  /  ALT  63[H]  /  AlkPhos  133[H]  04-14      Urinalysis Basic - ( 14 Apr 2025 06:38 )    Color: x / Appearance: x / SG: x / pH: x  Gluc: 95 mg/dL / Ketone: x  / Bili: x / Urobili: x   Blood: x / Protein: x / Nitrite: x   Leuk Esterase: x / RBC: x / WBC x   Sq Epi: x / Non Sq Epi: x / Bacteria: x

## 2025-04-14 NOTE — PROGRESS NOTE ADULT - PROVIDER SPECIALTY LIST ADULT
Rehab Medicine

## 2025-04-14 NOTE — DISCHARGE NOTE NURSING/CASE MANAGEMENT/SOCIAL WORK - NSDCVIVACCINE_GEN_ALL_CORE_FT
Tdap; 02-Jun-2024 22:42; Jenise Pratt (RN); [employer]; [id number] (Exp. Date: 01-Mar-2026); IntraMuscular; Deltoid Right.; 0.5 milliLiter(s); VIS (VIS Published: 09-May-2013, VIS Presented: 02-Jun-2024);

## 2025-04-14 NOTE — DISCHARGE NOTE NURSING/CASE MANAGEMENT/SOCIAL WORK - PATIENT PORTAL LINK FT
You can access the FollowMyHealth Patient Portal offered by [facility] by registering at the following website: [email or web address]. By joining [facility]’s FollowMyHealth portal, you will also be able to view your health information using other applications (apps) compatible with our system.

## 2025-04-14 NOTE — PROGRESS NOTE ADULT - ASSESSMENT
ASSESSMENT/PLAN    70 y.o. F with pmhx of mild dementia, anxiety bilateral cataracts presenting for mechanism fall with Left intertrochanteric hip s/p ORIF 3/24/2025. Postop coarse complicated by orthostatic hypotension postop anemia. Her hip fracture is quite painful.      #Rehab of gait abnormality and decline in function 2/2 L hip IT fx s/p ORIF 3/26/2025  -CT LLE: Acute, impacted left femoral intertrochanteric fracture.  -c/w Tylenol 975 mg q8h ATC, celebrex 100 mg q8AM, and oxycodone 5 mg  q4h prn for pain, oxycodone 5 mg po q8am   -WBAT on the LLE  -PT/OT Eval and treat: Patient requires 3 hrs daily of interdisciplinary inpatient rehab at least 5 days a week.   - staple removed 4/12/2025    #Post op anemia, left thigh ecchymosis  -Hgb 11.9 on admission   -Hgb 8.7 (4/7)  -Monitor CBC, transfuse <7    #Orthostatic hypotension (improving)  Close monitoring of her vitals  500 cc NS fluid bolus 3/29  1L NS 3/30  500cc NS fluid bolus 3/31  - encouraged oral fluid intake  - S/p  cc bolus (4/2)  - increase midodrine 2.5 mg bid to 5 mg bid(7:30 am and 12:30 pm) (4/2)  - Symptomatic 4/6 dizziness, headache will give another 500cc bolus with improvement in symptoms    #Cervicogenic headache/neck pain  #Bilateral eye discomfort; dry eyes  - She did have a CT of cervical spine as part of the work up.   - Patient complaining of headache, noted to be hypotensive, c/w midodrine as above  - Artificial tears for dry eye - increase frequency 4/13  - On discharge will switch - stop celecoxib and start on Nabumetone 500mg BID  - Start Robaxin 1000mg QHS x 7 days 4/13  - Recommend cervical pillow - script given to family  - home PT can include stretching exercises     #Hyperkalemia  - K 5.1 on 4/7   - will monitor     #MSK Sternal and rib pain, TTP  Lidocaine patch qd  Monitor    #Dementia, Mild  She benefit from and tolerate 3 hrs of daily intensive therapies.   -C/w home med donepezil 10 mg qhs    #Anxiety/depression  -c/w home med escitalopram 5 mg daily    #Contact dermatitis (4/2)  involving buttocks  - apply hydrocortisone ointment bid     -Pain control: Tylenol 975 mg q8h, Celebrex 100 mg twice a day;  oxycodone 5 mg po q4h prn, oxycodone 5 mg po daily at 8 a.m and 12:30 pm    -GI/Bowel Mgmt: senna/Miralax     - Diet: Regular, Halal      Precautions / PROPHYLAXIS:      - Falls    - Ortho: Weight bearing status: WBAT LLE      - DVT prophylaxis: Lovenox  ASSESSMENT/PLAN    70 y.o. F with pmhx of mild dementia, anxiety bilateral cataracts presenting for mechanism fall with Left intertrochanteric hip s/p ORIF 3/24/2025. Postop coarse complicated by orthostatic hypotension postop anemia. Her hip fracture is quite painful.      #Rehab of gait abnormality and decline in function 2/2 L hip IT fx s/p ORIF 3/26/2025  -CT LLE: Acute, impacted left femoral intertrochanteric fracture.  -c/w Tylenol 975 mg q8h ATC, celebrex 100 mg q8AM, and oxycodone 5 mg  q4h prn for pain, oxycodone 5 mg po q8am   -WBAT on the LLE  -PT/OT Eval and treat: Patient requires 3 hrs daily of interdisciplinary inpatient rehab at least 5 days a week.   - staple removed 4/12/2025    #Post op anemia, left thigh ecchymosis  -Hgb 11.9 on admission   -Hgb 8.7 (4/7)  -Monitor CBC, transfuse <7    #Orthostatic hypotension (improving)  Close monitoring of her vitals  500 cc NS fluid bolus 3/29  1L NS 3/30  500cc NS fluid bolus 3/31  - encouraged oral fluid intake  - S/p  cc bolus (4/2)  - increase midodrine 2.5 mg bid to 5 mg bid(7:30 am and 12:30 pm) (4/2)  - Symptomatic 4/6 dizziness, headache will give another 500cc bolus with improvement in symptoms    #Cervicogenic headache/neck pain  #Bilateral eye discomfort; dry eyes  - She did have a CT of cervical spine as part of the work up.   - Patient complaining of headache, noted to be hypotensive, c/w midodrine as above  - Artificial tears for dry eye - increase frequency 4/13  - On discharge will switch - stop celecoxib and start on Nabumetone 500mg BID  - Start Robaxin 1000mg QHS x 7 days 4/13  - Recommend cervical pillow - script given to family  - home PT can include stretching exercises     #Hyperkalemia (resolved)  - K 4.4 on 4/14    #Mild elevated LFTs  4/14  ALP downtrending (174->133)  AST/ALT: 47/58->73/63  - patient on standing tylenol 975 q8      #MSK Sternal and rib pain, TTP  Lidocaine patch qd  Monitor    #Dementia, Mild  She benefit from and tolerate 3 hrs of daily intensive therapies.   -C/w home med donepezil 10 mg qhs    #Anxiety/depression  -c/w home med escitalopram 5 mg daily    #Contact dermatitis (4/2)  involving buttocks  - apply hydrocortisone ointment bid     -Pain control: Tylenol 975 mg q8h, Celebrex 100 mg twice a day;  oxycodone 5 mg po q4h prn, oxycodone 5 mg po daily at 8 a.m and 12:30 pm    -GI/Bowel Mgmt: senna/Miralax     - Diet: Regular, Halal      Precautions / PROPHYLAXIS:      - Falls    - Ortho: Weight bearing status: WBAT LLE      - DVT prophylaxis: Lovenox  ASSESSMENT/PLAN    70 y.o. F with pmhx of mild dementia, anxiety bilateral cataracts presenting for mechanism fall with Left intertrochanteric hip s/p ORIF 3/24/2025. Postop coarse complicated by orthostatic hypotension postop anemia. Her hip fracture is quite painful.      #Rehab of gait abnormality and decline in function 2/2 L hip IT fx s/p ORIF 3/26/2025  -CT LLE: Acute, impacted left femoral intertrochanteric fracture.  -c/w Tylenol 975 mg q8h ATC, celebrex 100 mg q8AM, and oxycodone 5 mg  q4h prn for pain, oxycodone 5 mg po q8am   -WBAT on the LLE  -PT/OT Eval and treat: Patient requires 3 hrs daily of interdisciplinary inpatient rehab at least 5 days a week.   - staple removed 4/12/2025    #Post op anemia, left thigh ecchymosis  -Hgb 11.9 on admission   -Hgb 8.7 (4/7)  -Monitor CBC, transfuse <7    #Orthostatic hypotension (improving)  Close monitoring of her vitals  500 cc NS fluid bolus 3/29  1L NS 3/30  500cc NS fluid bolus 3/31  - encouraged oral fluid intake  - S/p  cc bolus (4/2)  - increase midodrine 2.5 mg bid to 5 mg bid(7:30 am and 12:30 pm) (4/2)  - Symptomatic 4/6 dizziness, headache will give another 500cc bolus with improvement in symptoms    #Cervicogenic headache/neck pain  #Bilateral eye discomfort; dry eyes  - She did have a CT of cervical spine as part of the work up.   - Patient complaining of headache, noted to be hypotensive, c/w midodrine as above  - Artificial tears for dry eye - increase frequency 4/13  - On discharge will switch - stop celecoxib and start on Nabumetone 500mg BID  - Start Robaxin 1000mg QHS x 7 days 4/13  - Recommend cervical pillow - script given to family  - home PT can include stretching exercises     #Hyperkalemia (resolved)  - K 4.4 on 4/14    #Mild elevated LFTs  4/14  ALP downtrending (174->133)  AST/ALT: 47/58->73/63  - patient on standing tylenol 975 q8; will discontinue tylenol 975  - will order tylenol 650 q8 PRN for pain      #MSK Sternal and rib pain, TTP  Lidocaine patch qd  Monitor    #Dementia, Mild  She benefit from and tolerate 3 hrs of daily intensive therapies.   -C/w home med donepezil 10 mg qhs    #Anxiety/depression  -c/w home med escitalopram 5 mg daily    #Contact dermatitis (4/2)  involving buttocks  - apply hydrocortisone ointment bid     -Pain control: Tylenol 975 mg q8h, Celebrex 100 mg twice a day;  oxycodone 5 mg po q4h prn, oxycodone 5 mg po daily at 8 a.m and 12:30 pm    -GI/Bowel Mgmt: senna/Miralax     - Diet: Regular, Halal      Precautions / PROPHYLAXIS:      - Falls    - Ortho: Weight bearing status: WBAT LLE      - DVT prophylaxis: Lovenox
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
## 2025-04-15 DIAGNOSIS — M54.2 CERVICALGIA: ICD-10-CM
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
## 2025-04-15 DIAGNOSIS — K59.00 CONSTIPATION, UNSPECIFIED: ICD-10-CM

## 2025-04-15 DIAGNOSIS — Z98.41 CATARACT EXTRACTION STATUS, RIGHT EYE: ICD-10-CM

## 2025-04-15 DIAGNOSIS — R26.89 OTHER ABNORMALITIES OF GAIT AND MOBILITY: ICD-10-CM

## 2025-04-15 DIAGNOSIS — H04.123 DRY EYE SYNDROME OF BILATERAL LACRIMAL GLANDS: ICD-10-CM

## 2025-04-15 DIAGNOSIS — F03.A0 UNSPECIFIED DEMENTIA, MILD, WITHOUT BEHAVIORAL DISTURBANCE, PSYCHOTIC DISTURBANCE, MOOD DISTURBANCE, AND ANXIETY: ICD-10-CM

## 2025-04-15 DIAGNOSIS — F32.A DEPRESSION, UNSPECIFIED: ICD-10-CM

## 2025-04-15 DIAGNOSIS — R07.89 OTHER CHEST PAIN: ICD-10-CM

## 2025-04-15 DIAGNOSIS — L25.9 UNSPECIFIED CONTACT DERMATITIS, UNSPECIFIED CAUSE: ICD-10-CM

## 2025-04-15 DIAGNOSIS — D64.9 ANEMIA, UNSPECIFIED: ICD-10-CM

## 2025-04-15 DIAGNOSIS — I95.1 ORTHOSTATIC HYPOTENSION: ICD-10-CM

## 2025-04-15 DIAGNOSIS — S72.142D DISPLACED INTERTROCHANTERIC FRACTURE OF LEFT FEMUR, SUBSEQUENT ENCOUNTER FOR CLOSED FRACTURE WITH ROUTINE HEALING: ICD-10-CM

## 2025-04-15 DIAGNOSIS — S70.12XD CONTUSION OF LEFT THIGH, SUBSEQUENT ENCOUNTER: ICD-10-CM

## 2025-04-15 DIAGNOSIS — Z98.42 CATARACT EXTRACTION STATUS, LEFT EYE: ICD-10-CM

## 2025-04-15 DIAGNOSIS — E87.5 HYPERKALEMIA: ICD-10-CM

## 2025-04-15 DIAGNOSIS — G44.86 CERVICOGENIC HEADACHE: ICD-10-CM

## 2025-04-17 RX ORDER — ASPIRIN 325 MG
1 TABLET ORAL
Qty: 42 | Refills: 0
Start: 2025-04-17 | End: 2025-05-07

## 2025-04-17 RX ORDER — ESCITALOPRAM OXALATE 20 MG/1
1 TABLET ORAL
Qty: 30 | Refills: 0
Start: 2025-04-17 | End: 2025-05-16

## 2025-04-17 RX ORDER — METHOCARBAMOL 500 MG/1
1 TABLET, FILM COATED ORAL
Qty: 30 | Refills: 0
Start: 2025-04-17 | End: 2025-05-16

## 2025-04-17 RX ORDER — DONEPEZIL HYDROCHLORIDE 5 MG/1
1 TABLET ORAL
Qty: 30 | Refills: 0
Start: 2025-04-17 | End: 2025-05-16

## 2025-04-17 RX ORDER — MONTELUKAST SODIUM 10 MG/1
1 TABLET ORAL
Qty: 30 | Refills: 0
Start: 2025-04-17 | End: 2025-05-16

## 2025-04-17 RX ORDER — MIDODRINE HYDROCHLORIDE 5 MG/1
1 TABLET ORAL
Qty: 60 | Refills: 0
Start: 2025-04-17 | End: 2025-05-16

## 2025-04-17 RX ORDER — NALOXONE HYDROCHLORIDE 0.4 MG/ML
1 INJECTION, SOLUTION INTRAMUSCULAR; INTRAVENOUS; SUBCUTANEOUS
Qty: 1 | Refills: 0
Start: 2025-04-17 | End: 2025-04-18

## 2025-04-17 RX ORDER — OXYCODONE HYDROCHLORIDE AND ACETAMINOPHEN 10; 325 MG/1; MG/1
1 TABLET ORAL
Qty: 21 | Refills: 0
Start: 2025-04-17 | End: 2025-04-23

## 2025-04-24 ENCOUNTER — APPOINTMENT (OUTPATIENT)
Dept: ORTHOPEDIC SURGERY | Facility: CLINIC | Age: 70
End: 2025-04-24
Payer: MEDICAID

## 2025-04-24 DIAGNOSIS — S72.002D FRACTURE OF UNSPECIFIED PART OF NECK OF LEFT FEMUR, SUBSEQUENT ENCOUNTER FOR CLOSED FRACTURE WITH ROUTINE HEALING: ICD-10-CM

## 2025-04-24 PROCEDURE — 99212 OFFICE O/P EST SF 10 MIN: CPT | Mod: 25

## 2025-04-24 PROCEDURE — 73502 X-RAY EXAM HIP UNI 2-3 VIEWS: CPT

## 2025-05-16 ENCOUNTER — APPOINTMENT (OUTPATIENT)
Dept: INTERNAL MEDICINE | Facility: CLINIC | Age: 70
End: 2025-05-16

## 2025-05-20 ENCOUNTER — APPOINTMENT (OUTPATIENT)
Dept: ORTHOPEDIC SURGERY | Facility: CLINIC | Age: 70
End: 2025-05-20

## 2025-06-06 NOTE — DISCHARGE NOTE NURSING/CASE MANAGEMENT/SOCIAL WORK - FINANCIAL ASSISTANCE
[facility] provides services at a reduced cost to those who are determined to be eligible through [facility]’s financial assistance program. Information regarding [facility]’s financial assistance program can be found by going to [email or web address] or by calling [phone]. Not applicable

## 2025-06-16 ENCOUNTER — RX RENEWAL (OUTPATIENT)
Age: 70
End: 2025-06-16

## 2025-07-01 ENCOUNTER — APPOINTMENT (OUTPATIENT)
Dept: ORTHOPEDIC SURGERY | Facility: CLINIC | Age: 70
End: 2025-07-01

## 2025-07-21 ENCOUNTER — APPOINTMENT (OUTPATIENT)
Dept: NEUROLOGY | Facility: CLINIC | Age: 70
End: 2025-07-21